# Patient Record
Sex: MALE | Race: WHITE | NOT HISPANIC OR LATINO
[De-identification: names, ages, dates, MRNs, and addresses within clinical notes are randomized per-mention and may not be internally consistent; named-entity substitution may affect disease eponyms.]

---

## 2017-09-25 ENCOUNTER — CHARTING TRANS (OUTPATIENT)
Dept: OTHER | Age: 72
End: 2017-09-25

## 2018-10-01 ENCOUNTER — CHARTING TRANS (OUTPATIENT)
Dept: OTHER | Age: 73
End: 2018-10-01

## 2018-10-22 ENCOUNTER — CHARTING TRANS (OUTPATIENT)
Dept: OTHER | Age: 73
End: 2018-10-22

## 2018-11-06 ENCOUNTER — CHARTING TRANS (OUTPATIENT)
Dept: OTHER | Age: 73
End: 2018-11-06

## 2018-11-07 ENCOUNTER — OFFICE VISIT (OUTPATIENT)
Dept: WOUND CARE | Facility: HOSPITAL | Age: 73
End: 2018-11-07
Attending: INTERNAL MEDICINE
Payer: MEDICARE

## 2018-11-07 DIAGNOSIS — R60.0 LOCALIZED EDEMA: ICD-10-CM

## 2018-11-07 DIAGNOSIS — I87.331 CHRONIC VENOUS HYPERTENSION (IDIOPATHIC) WITH ULCER AND INFLAMMATION OF RIGHT LOWER EXTREMITY (HCC): ICD-10-CM

## 2018-11-07 DIAGNOSIS — S81.801A UNSPECIFIED OPEN WOUND, RIGHT LOWER LEG, INITIAL ENCOUNTER: Primary | ICD-10-CM

## 2018-11-07 DIAGNOSIS — S81.802A UNSPECIFIED OPEN WOUND, LEFT LOWER LEG, INITIAL ENCOUNTER: ICD-10-CM

## 2018-11-07 DIAGNOSIS — L97.919 CHRONIC VENOUS HYPERTENSION (IDIOPATHIC) WITH ULCER AND INFLAMMATION OF RIGHT LOWER EXTREMITY (HCC): ICD-10-CM

## 2018-11-07 PROCEDURE — 11045 DBRDMT SUBQ TISS EACH ADDL: CPT

## 2018-11-07 PROCEDURE — 11042 DBRDMT SUBQ TIS 1ST 20SQCM/<: CPT

## 2018-11-07 PROCEDURE — 29581 APPL MULTLAYER CMPRN SYS LEG: CPT

## 2018-11-07 PROCEDURE — 99205 OFFICE O/P NEW HI 60 MIN: CPT

## 2018-11-07 PROCEDURE — 97597 DBRDMT OPN WND 1ST 20 CM/<: CPT

## 2018-11-07 NOTE — PROGRESS NOTES
Chief Complaint  This information was obtained from the patient  Patient is here for an initial consult. He presents with wounds on his lower legs from edema from surgery ranging from [id number].  He has been seeing [facility] wound care for the past 5 ye history of:  Asthma  Hyperlipidemia  Hypertension  Venous insufficiency  Prostate cancer    Surgical History  This information was obtained from the patient  Patient has a surgical history of:  Retinal surgery  Vein surgery  Hip replacement  Knee replaceme Not Healed. Initial wound encounter measurements are 1.9cm length x 1.2cm width with no measurable depth, with an area of 2.28 sq cm . No tunneling has been noted. No sinus tract has been noted. No undermining has been noted.  There is a small amount of ser exhibited: Edema. The temperature of the periwound skin is WNL. Periwound skin does not exhibit signs or symptoms of infection.  Local Pulse is Normal.    Wound #4 Left, Lateral Lower Leg is a chronic Full Thickness Venous Ulcer and has received a status of noted. No sinus tract has been noted. No undermining has been noted. There is a scant amount of serous drainage noted which has no odor. The patient reports a wound pain of level 0/10. The wound margin is well defined.  Wound bed has 51-75% slough, 1-25% pi present  Compression Device In Use: Yes  Device Used Correctly: Yes  Compression Device Used: Profore 4 layer  Calf Measurement 34 cm from heel with left measurement of 38 cm  Ankle Measurement 12 cm from heel with left measurement of 24.4 cm  Right Extrem out was not conducted prior to the start of the procedure. There was a minimal amount of bleeding. The procedure was tolerated well with a pain level of 0 throughout and a pain level of 0 following the procedure.  Post Debridement Measurements: 1.9cm length cm;    Wound #7  Wound #7 (Venous Ulcer) is located on the right, medial lower leg. A Multi-Layer Compression Wrap procedure was performed. A 2 Layers Coflex was applied with . The procedure was tolerated well.         Plan    Wound Orders:  Wound #1 Left, Left Leg  Avoid prolonged standing in one place. Elevate leg(s)as much as possible.      Wound #4 Left, Lateral Lower Leg     Topicals:  Initial Anesthetic Order: Apply lidocaine to wound bed on all future wound center visits during preparation for physici center visits during preparation for physician exam if wound bed contains fibrin or eschar. 4% Topical Lidocaine    Wound Cleansing & Dressings  May shower with protection.   Cleanse with saline or wound cleanser  Collagen - endoform  Hydrofera Transfer

## 2018-11-16 ENCOUNTER — OFFICE VISIT (OUTPATIENT)
Dept: WOUND CARE | Facility: HOSPITAL | Age: 73
End: 2018-11-16
Attending: INTERNAL MEDICINE
Payer: MEDICARE

## 2018-11-16 DIAGNOSIS — S81.801D UNSPECIFIED OPEN WOUND, RIGHT LOWER LEG, SUBSEQUENT ENCOUNTER: ICD-10-CM

## 2018-11-16 DIAGNOSIS — S81.802A WOUND OF LEFT LEG: Primary | ICD-10-CM

## 2018-11-16 DIAGNOSIS — S81.802D UNSPECIFIED OPEN WOUND, LEFT LOWER LEG, SUBSEQUENT ENCOUNTER: ICD-10-CM

## 2018-11-16 PROCEDURE — 87077 CULTURE AEROBIC IDENTIFY: CPT

## 2018-11-16 PROCEDURE — 11045 DBRDMT SUBQ TISS EACH ADDL: CPT

## 2018-11-16 PROCEDURE — 87205 SMEAR GRAM STAIN: CPT

## 2018-11-16 PROCEDURE — 87186 SC STD MICRODIL/AGAR DIL: CPT

## 2018-11-16 PROCEDURE — 29581 APPL MULTLAYER CMPRN SYS LEG: CPT

## 2018-11-16 PROCEDURE — 87070 CULTURE OTHR SPECIMN AEROBIC: CPT

## 2018-11-16 PROCEDURE — 97597 DBRDMT OPN WND 1ST 20 CM/<: CPT

## 2018-11-16 PROCEDURE — 97598 DBRDMT OPN WND ADDL 20CM/<: CPT

## 2018-11-16 PROCEDURE — 11042 DBRDMT SUBQ TIS 1ST 20SQCM/<: CPT

## 2018-11-16 NOTE — PROGRESS NOTES
Chief Complaint  This information was obtained from the patient  Patient is here for a follow up visit for b/l LE wounds. He has no complaints and states home health RN is \"doing a great job. \" Came in with coban.     Allergies  NKDA    HPI  This informa Yes        Objective    Constitutional  Height/Length: 73 in (185.42 cm), Weight: 275 lbs (125 kgs), BMI: 36.3, Temperature: 97.4 °F (36.33 °C), Pulse: 64 bpm, Respiratory Rate: 16 breaths/min, Blood Pressure: 118/74 mmHg.      Integumentary (Hair, Skin)  W x 1.4cm width x 0.1cm depth, with an area of 3.92 sq cm and a volume of 0.392 cubic cm. No tunneling has been noted. No sinus tract has been noted. No undermining has been noted. There is a large amount of serous drainage noted which has no odor.  The patie has 51-75% slough, 1-25% bright red, pink, spongy granulation. The periwound skin exhibited: Edema, Moist, Maceration, Erythema. The temperature of the periwound skin is WNL. Periwound skin does not exhibit signs or symptoms of infection.  Local Pulse is 1.3cm length x 2.7cm width x 0.1cm depth, with an area of 3.51 sq cm and a volume of 0.351 cubic cm. No tunneling has been noted. No sinus tract has been noted. No undermining has been noted.  There is a moderate amount of serous drainage noted which has no left lower leg, subsequent encounter  (Encounter Diagnosis) I87.333 - Chronic venous hypertension (idiopathic) with ulcer and inflammation of bilateral lower extremity  (Encounter Diagnosis) R60.0 - Localized edema        Procedures    Wound #3  Wound #3 ( of 55 sq cm and a volume of 11 cubic cm; Wound #5  Wound #5 (Venous Ulcer) is located on the left, posterior lower leg.  A selective debridement with a total area debrided of 23.5 sq cm was performed by Monika Pelayo MD. to remove devitalized tis Lidocaine    Wound Cleansing & Dressings  May shower with protection. Cleanse with saline or wound cleanser  Iodosorb  Kerramax/Super absorbent  ABD pad  Change dressing 3x/week    Compression Therapy:  Comprilan  Compression to Right Leg.   Compression to Therapy:  Comprilan  Compression to Right Leg. Compression to Left Leg  Avoid prolonged standing in one place. Elevate leg(s)as much as possible.      Wound #5 Left, Posterior Lower Leg     Topicals:  Initial Anesthetic Order: Apply lidocaine to wound bed on all future wound center visits during preparation for physician exam if wound bed contains fibrin or eschar.   4% Topical Lidocaine    Wound Cleansing & Dressings  Cleanse with saline or wound cleanser  Honey Gel - cover with adaptic and gauze  Change

## 2018-11-19 ENCOUNTER — OFFICE VISIT (OUTPATIENT)
Dept: WOUND CARE | Facility: HOSPITAL | Age: 73
End: 2018-11-19
Attending: INTERNAL MEDICINE
Payer: MEDICARE

## 2018-11-19 DIAGNOSIS — S81.802D UNSPECIFIED OPEN WOUND, LEFT LOWER LEG, SUBSEQUENT ENCOUNTER: ICD-10-CM

## 2018-11-19 DIAGNOSIS — S81.801D UNSPECIFIED OPEN WOUND, RIGHT LOWER LEG, SUBSEQUENT ENCOUNTER: Primary | ICD-10-CM

## 2018-11-19 DIAGNOSIS — I87.331 CHRONIC VENOUS HYPERTENSION (IDIOPATHIC) WITH ULCER AND INFLAMMATION OF RIGHT LOWER EXTREMITY (HCC): ICD-10-CM

## 2018-11-19 DIAGNOSIS — L97.919 CHRONIC VENOUS HYPERTENSION (IDIOPATHIC) WITH ULCER AND INFLAMMATION OF RIGHT LOWER EXTREMITY (HCC): ICD-10-CM

## 2018-11-19 PROCEDURE — 29581 APPL MULTLAYER CMPRN SYS LEG: CPT

## 2018-11-21 ENCOUNTER — OFFICE VISIT (OUTPATIENT)
Dept: WOUND CARE | Facility: HOSPITAL | Age: 73
End: 2018-11-21
Attending: INTERNAL MEDICINE
Payer: MEDICARE

## 2018-11-21 DIAGNOSIS — S81.802D UNSPECIFIED OPEN WOUND, LEFT LOWER LEG, SUBSEQUENT ENCOUNTER: ICD-10-CM

## 2018-11-21 DIAGNOSIS — L97.919 CHRONIC VENOUS HYPERTENSION (IDIOPATHIC) WITH ULCER AND INFLAMMATION OF RIGHT LOWER EXTREMITY (HCC): ICD-10-CM

## 2018-11-21 DIAGNOSIS — S81.801D UNSPECIFIED OPEN WOUND, RIGHT LOWER LEG, SUBSEQUENT ENCOUNTER: Primary | ICD-10-CM

## 2018-11-21 DIAGNOSIS — I87.331 CHRONIC VENOUS HYPERTENSION (IDIOPATHIC) WITH ULCER AND INFLAMMATION OF RIGHT LOWER EXTREMITY (HCC): ICD-10-CM

## 2018-11-21 PROCEDURE — 11045 DBRDMT SUBQ TISS EACH ADDL: CPT

## 2018-11-21 PROCEDURE — 97597 DBRDMT OPN WND 1ST 20 CM/<: CPT

## 2018-11-21 PROCEDURE — 11042 DBRDMT SUBQ TIS 1ST 20SQCM/<: CPT

## 2018-11-21 NOTE — PROGRESS NOTES
Chief Complaint  This information was obtained from the patient  Patient is here for a follow up bilateral leg . Patients stated he took off the wrap due too tight. He is having pain left ankle took a ibuprofen to relieve a pain.     Allergies  NKDA    HP ()  Neurological    General Notes:  negative except HPI    Additional Information  Medication reconciliation completed at today's visit. : Yes        Objective    Constitutional  Height/Length: 73 in (185.42 cm), Weight: 275 lbs (125 kgs), BMI: 36.3, Tem not exhibit signs or symptoms of infection. Local Pulse is Normal.    Wound #4 Left, Lateral Lower Leg is a chronic Full Thickness Venous Ulcer and has received a status of Not Healed.  Subsequent wound encounter measurements are 13cm length x 4.5cm width x noted. There is a moderate amount of serous drainage noted which has no odor. The patient reports a wound pain of level 0/10. The wound margin is well defined. Wound bed has 1-25% slough, 51-75% bright red, pink, firm granulation.    The periwound skin exhi Pulse is Normal.    Lower Extremity Assessment  Edema Assessment:  Left Extremity: Edema is present  Compression Device In Use: Yes  Device Used Correctly: Yes  Compression Device Used: Multi-Layer Wrap  Calf Measurement 34 cm from heel with left measureme control was achieved using N/A. A time out was not conducted prior to the start of the procedure. A moderate amount of bleeding was controlled with pressure.  The procedure was tolerated well with a pain level of 0 throughout and a pain level of 0 following Apply lidocaine to wound bed on all future wound center visits during preparation for physician exam if wound bed contains fibrin or eschar. 4% Topical Lidocaine    Wound Cleansing & Dressings  May shower with protection.   Cleanse with saline or wound skyler protection. Cleanse with saline or wound cleanser  Honey Gel  Kerramax/Super absorbent  ABD pad  Change dressing every other day and as needed.     Wound #7 Right, Medial Lower Leg     Topicals:  Initial Anesthetic Order: Apply lidocaine to wound bed on al

## 2018-11-30 ENCOUNTER — OFFICE VISIT (OUTPATIENT)
Dept: WOUND CARE | Facility: HOSPITAL | Age: 73
End: 2018-11-30
Attending: INTERNAL MEDICINE
Payer: MEDICARE

## 2018-11-30 DIAGNOSIS — S81.801D UNSPECIFIED OPEN WOUND, RIGHT LOWER LEG, SUBSEQUENT ENCOUNTER: Primary | ICD-10-CM

## 2018-11-30 DIAGNOSIS — L97.919 CHRONIC VENOUS HYPERTENSION (IDIOPATHIC) WITH ULCER AND INFLAMMATION OF RIGHT LOWER EXTREMITY (HCC): ICD-10-CM

## 2018-11-30 DIAGNOSIS — I87.331 CHRONIC VENOUS HYPERTENSION (IDIOPATHIC) WITH ULCER AND INFLAMMATION OF RIGHT LOWER EXTREMITY (HCC): ICD-10-CM

## 2018-11-30 DIAGNOSIS — S81.802D UNSPECIFIED OPEN WOUND, LEFT LOWER LEG, SUBSEQUENT ENCOUNTER: ICD-10-CM

## 2018-11-30 PROCEDURE — 97598 DBRDMT OPN WND ADDL 20CM/<: CPT

## 2018-11-30 PROCEDURE — 11042 DBRDMT SUBQ TIS 1ST 20SQCM/<: CPT

## 2018-11-30 PROCEDURE — 97597 DBRDMT OPN WND 1ST 20 CM/<: CPT

## 2018-11-30 PROCEDURE — 11045 DBRDMT SUBQ TISS EACH ADDL: CPT

## 2018-11-30 NOTE — PROGRESS NOTES
Chief Complaint  This information was obtained from the patient  Patient is here for bilateral lower legs. Allergies  NKDA    HPI  This information was obtained from the patient    11-30-18: WOunds stable and slightly better. 11-21-18:  Wound cultu (GI)  Genitourinary ()  Neurological    General Notes:  negative except HPI    Additional Information  Medication reconciliation completed at today's visit. : Yes        Objective    Constitutional  Height/Length: 73 in (185.42 cm), Weight: 275 lbs (125 Lateral Lower Leg is a chronic Full Thickness Venous Ulcer and has received a status of Not Healed. Subsequent wound encounter measurements are 12.9cm length x 4.7cm width x 0.1cm depth, with an area of 60.63 sq cm and a volume of 6.063 cubic cm.  No tunnel odor. The patient reports a wound pain of level 0/10. The wound margin is well defined. Wound bed has 26-50% slough, 26-50% bright red, pink, firm granulation. The periwound skin exhibited: Edema, Dry/Scaly, Erythema.  The temperature of the periwound ski Device In Use: Yes  Device Used Correctly: Yes  Compression Device Used: Tubigrip or Tubifast  Calf Measurement 34 cm from heel with left measurement of 41 cm  Ankle Measurement 12 cm from heel with left measurement of 27.6 cm  Right Extremity: Edema is pr achieved using 4% Lido. A time out was not conducted prior to the start of the procedure. A minimal amount of bleeding was controlled with pressure.  The procedure was tolerated well with a pain level of 0 throughout and a pain level of 0 following the proc (Venous Ulcer) is located on the left, medial ankle. A selective debridement with a total area debrided of 11.55 sq cm was performed by Brian Figueroa MD. to remove devitalized tissue: biofilm and slough.  The following instrument(s) were used: curet Anesthetic Order: Apply lidocaine to wound bed on all future wound center visits during preparation for physician exam if wound bed contains fibrin or eschar. 4% Topical Lidocaine    Wound Cleansing & Dressings  May shower with protection.   Cleanse with s shower with protection. Cleanse with saline or wound cleanser  Honey Gel  Kerramax/Super absorbent  ABD pad  Change dressing every other day and as needed.     Wound #8 Left Second Toe     Topicals:  Initial Anesthetic Order: Apply lidocaine to wound bed o

## 2018-12-07 ENCOUNTER — OFFICE VISIT (OUTPATIENT)
Dept: WOUND CARE | Facility: HOSPITAL | Age: 73
End: 2018-12-07
Attending: INTERNAL MEDICINE
Payer: MEDICARE

## 2018-12-07 DIAGNOSIS — L97.919 CHRONIC VENOUS HYPERTENSION (IDIOPATHIC) WITH ULCER AND INFLAMMATION OF RIGHT LOWER EXTREMITY (HCC): ICD-10-CM

## 2018-12-07 DIAGNOSIS — S81.801D UNSPECIFIED OPEN WOUND, RIGHT LOWER LEG, SUBSEQUENT ENCOUNTER: Primary | ICD-10-CM

## 2018-12-07 DIAGNOSIS — S81.802D UNSPECIFIED OPEN WOUND, LEFT LOWER LEG, SUBSEQUENT ENCOUNTER: ICD-10-CM

## 2018-12-07 DIAGNOSIS — I87.331 CHRONIC VENOUS HYPERTENSION (IDIOPATHIC) WITH ULCER AND INFLAMMATION OF RIGHT LOWER EXTREMITY (HCC): ICD-10-CM

## 2018-12-07 PROCEDURE — 11045 DBRDMT SUBQ TISS EACH ADDL: CPT

## 2018-12-07 PROCEDURE — 29581 APPL MULTLAYER CMPRN SYS LEG: CPT

## 2018-12-07 PROCEDURE — 97597 DBRDMT OPN WND 1ST 20 CM/<: CPT

## 2018-12-07 PROCEDURE — 11042 DBRDMT SUBQ TIS 1ST 20SQCM/<: CPT

## 2018-12-07 RX ORDER — FUROSEMIDE 40 MG/1
40 TABLET ORAL DAILY
Qty: 14 TABLET | Refills: 0 | Status: SHIPPED | OUTPATIENT
Start: 2018-12-07 | End: 2018-12-21

## 2018-12-07 RX ORDER — POTASSIUM CHLORIDE 750 MG/1
10 TABLET, FILM COATED, EXTENDED RELEASE ORAL DAILY
Qty: 14 TABLET | Refills: 0 | Status: SHIPPED | OUTPATIENT
Start: 2018-12-07 | End: 2018-12-21

## 2018-12-07 NOTE — PROGRESS NOTES
Chief Complaint  This information was obtained from the patient  Patient is here for bilateral lower legs.  Patients stated everything is looking good and no pain    Allergies  NKDA    HPI  This information was obtained from the patient    12-7-18: Legs s (Central/Peripheral)  Gastrointestinal (GI)  Genitourinary ()  Neurological    General Notes:  negative except HPI    Additional Information  Medication reconciliation completed at today's visit. : Yes        Objective    Constitutional  Height/Length: 7 cubic cm. No tunneling has been noted. No sinus tract has been noted. No undermining has been noted. There is a large amount of serous drainage noted which has no odor. The patient reports a wound pain of level 0/10. The wound margin is well defined.  Wound wound encounter measurements are 4.1cm length x 1.6cm width x 0.1cm depth, with an area of 6.56 sq cm and a volume of 0.656 cubic cm. No tunneling has been noted. No sinus tract has been noted. No undermining has been noted.  There is a small amount of sero measurement of 39 cm   Ankle Measurement 12 cm from heel with right measurement of 24.7 cm  Vascular Assessment:  Left Extremity Pulses:   Dorsalis Pedis: Palpable  Right Extremity Pulses:   Dorsalis Pedis: Palpable  Left Extremity colors, hair growth, and cm;    Wound #6  Wound #6 (Venous Ulcer) is located on the left, medial ankle. A selective debridement with a total area debrided of 11.22 sq cm was performed by Shelly Paul MD. to remove devitalized tissue: biofilm, fibrin, and slough.  The follo Topicals:  Initial Anesthetic Order: Apply lidocaine to wound bed on all future wound center visits during preparation for physician exam if wound bed contains fibrin or eschar.   4% Topical Lidocaine    Wound Cleansing & Dressings  May shower with prot Leg.  Compression to Left Leg  Avoid prolonged standing in one place. Elevate leg(s)as much as possible. Follow-Up Appointments  Return Appointment in 1 week. - [facility]  Please schedule for 30 minute time slot.     Misc/Additional Orders  Home health care n

## 2018-12-10 ENCOUNTER — DOCUMENTATION (OUTPATIENT)
Dept: OPTOMETRY | Age: 73
End: 2018-12-10

## 2018-12-14 ENCOUNTER — OFFICE VISIT (OUTPATIENT)
Dept: WOUND CARE | Facility: HOSPITAL | Age: 73
End: 2018-12-14
Attending: INTERNAL MEDICINE
Payer: MEDICARE

## 2018-12-14 ENCOUNTER — HOSPITAL ENCOUNTER (OUTPATIENT)
Dept: LAB | Facility: HOSPITAL | Age: 73
Discharge: HOME | End: 2018-12-14
Attending: INTERNAL MEDICINE
Payer: MEDICARE

## 2018-12-14 DIAGNOSIS — S81.801D UNSPECIFIED OPEN WOUND, RIGHT LOWER LEG, SUBSEQUENT ENCOUNTER: Primary | ICD-10-CM

## 2018-12-14 DIAGNOSIS — I87.331 CHRONIC VENOUS HYPERTENSION (IDIOPATHIC) WITH ULCER AND INFLAMMATION OF RIGHT LOWER EXTREMITY (HCC): ICD-10-CM

## 2018-12-14 DIAGNOSIS — S81.802D UNSPECIFIED OPEN WOUND, LEFT LOWER LEG, SUBSEQUENT ENCOUNTER: ICD-10-CM

## 2018-12-14 DIAGNOSIS — L97.919 CHRONIC VENOUS HYPERTENSION (IDIOPATHIC) WITH ULCER AND INFLAMMATION OF RIGHT LOWER EXTREMITY (HCC): ICD-10-CM

## 2018-12-14 LAB
BASOPHILS # BLD AUTO: 0.08 X10(3) UL (ref 0–0.1)
BASOPHILS NFR BLD AUTO: 0.8 %
EOSINOPHIL # BLD AUTO: 0.35 X10(3) UL (ref 0–0.3)
EOSINOPHIL NFR BLD AUTO: 3.7 %
ERYTHROCYTE [DISTWIDTH] IN BLOOD BY AUTOMATED COUNT: 13.2 % (ref 11.5–16)
HCT VFR BLD AUTO: 45.3 % (ref 37–53)
HGB BLD-MCNC: 14.8 G/DL (ref 13–17)
IMMATURE GRANULOCYTE COUNT: 0.03 X10(3) UL (ref 0–1)
IMMATURE GRANULOCYTE RATIO %: 0.3 %
LYMPHOCYTES # BLD AUTO: 1.61 X10(3) UL (ref 0.9–4)
LYMPHOCYTES NFR BLD AUTO: 17 %
MCH RBC QN AUTO: 31.2 PG (ref 27–33.2)
MCHC RBC AUTO-ENTMCNC: 32.7 G/DL (ref 31–37)
MCV RBC AUTO: 95.4 FL (ref 80–99)
MONOCYTES # BLD AUTO: 0.75 X10(3) UL (ref 0.1–1)
MONOCYTES NFR BLD AUTO: 7.9 %
NEUTROPHIL ABS PRELIM: 6.65 X10 (3) UL (ref 1.3–6.7)
NEUTROPHILS # BLD AUTO: 6.65 X10(3) UL (ref 1.3–6.7)
NEUTROPHILS NFR BLD AUTO: 70.3 %
PLATELET # BLD AUTO: 179 10(3)UL (ref 150–450)
RBC # BLD AUTO: 4.75 X10(6)UL (ref 3.8–5.8)
RED CELL DISTRIBUTION WIDTH-SD: 45.9 FL (ref 35.1–46.3)
WBC # BLD AUTO: 9.5 X10(3) UL (ref 4–13)

## 2018-12-14 PROCEDURE — 36415 COLL VENOUS BLD VENIPUNCTURE: CPT | Performed by: INTERNAL MEDICINE

## 2018-12-14 PROCEDURE — 97598 DBRDMT OPN WND ADDL 20CM/<: CPT

## 2018-12-14 PROCEDURE — 85025 COMPLETE CBC W/AUTO DIFF WBC: CPT | Performed by: INTERNAL MEDICINE

## 2018-12-14 PROCEDURE — 80048 BASIC METABOLIC PNL TOTAL CA: CPT | Performed by: INTERNAL MEDICINE

## 2018-12-14 PROCEDURE — 97597 DBRDMT OPN WND 1ST 20 CM/<: CPT

## 2018-12-14 PROCEDURE — 29581 APPL MULTLAYER CMPRN SYS LEG: CPT

## 2018-12-14 NOTE — PROGRESS NOTES
Chief Complaint  This information was obtained from the patient  Patient is here for bilateral lower legs. Wraps good.  down 4 lbs in weight    Allergies  NKDA    HPI  This information was obtained from the patient    12-14-18: WOUNDS STABLE - BUT SIGNIFI related to:   Constitutional Symptoms (General Health)  Eyes  Ear/Nose/Mouth/Throat  Respiratory  Cardiovascular (Central/Peripheral)  Gastrointestinal (GI)  Genitourinary ()  Neurological    General Notes:  negative except HPI    Additional Information granulation. The periwound skin exhibited: Edema, Moist, Maceration, Erythema. The temperature of the periwound skin is Warm. Periwound skin does not exhibit signs or symptoms of infection.  Local Pulse is Normal.    Wound #7 Right, Medial Lower Leg is an Measurement 34 cm from heel with left measurement of 40 cm  Ankle Measurement 12 cm from heel with left measurement of 25.5 cm  Right Extremity: Edema is present  Compression Device In Use: Yes  Device Used Correctly: Yes  Compression Device Used: Tubigrip tolerated well with a pain level of 3 throughout and a pain level of 2 following the procedure. Post Debridement Measurements: 13.6cm length x 14cm width x 0.3cm depth; with an area of 190.4 sq cm and a volume of 57.12 cubic cm;     Wound #4 (Venous Ulcer) performed. A Multi-Layer Profore Regular was applied with high 30-40 mmhg. The procedure was tolerated well.         Plan    Wound Orders:  Wound #4 Left, Lateral Lower Leg     Topicals:  Initial Anesthetic Order: Apply lidocaine to wound bed on all future surgical shoe left    Additional Orders:    Compression Therapy:  Comprifore  Compression to Right Leg. Compression to Left Leg  Avoid prolonged standing in one place. Elevate leg(s)as much as possible.     Follow-Up Appointments  Return Appointment in 1

## 2018-12-21 ENCOUNTER — OFFICE VISIT (OUTPATIENT)
Dept: WOUND CARE | Facility: HOSPITAL | Age: 73
End: 2018-12-21
Attending: INTERNAL MEDICINE
Payer: MEDICARE

## 2018-12-21 DIAGNOSIS — S81.802D UNSPECIFIED OPEN WOUND, LEFT LOWER LEG, SUBSEQUENT ENCOUNTER: ICD-10-CM

## 2018-12-21 DIAGNOSIS — L97.919 CHRONIC VENOUS HYPERTENSION (IDIOPATHIC) WITH ULCER AND INFLAMMATION OF RIGHT LOWER EXTREMITY (HCC): ICD-10-CM

## 2018-12-21 DIAGNOSIS — I87.331 CHRONIC VENOUS HYPERTENSION (IDIOPATHIC) WITH ULCER AND INFLAMMATION OF RIGHT LOWER EXTREMITY (HCC): ICD-10-CM

## 2018-12-21 DIAGNOSIS — S81.801D UNSPECIFIED OPEN WOUND, RIGHT LOWER LEG, SUBSEQUENT ENCOUNTER: Primary | ICD-10-CM

## 2018-12-21 PROCEDURE — 97598 DBRDMT OPN WND ADDL 20CM/<: CPT

## 2018-12-21 PROCEDURE — 97597 DBRDMT OPN WND 1ST 20 CM/<: CPT

## 2018-12-21 PROCEDURE — 11042 DBRDMT SUBQ TIS 1ST 20SQCM/<: CPT

## 2018-12-21 PROCEDURE — 29581 APPL MULTLAYER CMPRN SYS LEG: CPT

## 2018-12-21 PROCEDURE — 11045 DBRDMT SUBQ TISS EACH ADDL: CPT

## 2018-12-21 RX ORDER — POTASSIUM CHLORIDE 750 MG/1
10 TABLET, FILM COATED, EXTENDED RELEASE ORAL 2 TIMES DAILY
Qty: 28 TABLET | Refills: 0 | Status: SHIPPED | OUTPATIENT
Start: 2018-12-21 | End: 2019-01-04

## 2018-12-21 RX ORDER — FUROSEMIDE 40 MG/1
40 TABLET ORAL 2 TIMES DAILY
Qty: 28 TABLET | Refills: 0 | Status: SHIPPED | OUTPATIENT
Start: 2018-12-21 | End: 2019-01-04

## 2018-12-21 NOTE — PROGRESS NOTES
Chief Complaint  This information was obtained from the patient  Patient is here for bilateral lower legs. Allergies  NKDA    HPI  This information was obtained from the patient    12-21-18: Wounds overall stable.  Edema improved - But, not much improv obtained from the patient  Patient denies complaints or symptoms related to:   Constitutional Symptoms (General Health)  Eyes  Ear/Nose/Mouth/Throat  Respiratory  Cardiovascular (Central/Peripheral)  Gastrointestinal (GI)  Genitourinary ()  Neurological Wound bed has 26-50% slough, 26-50% pink, firm granulation. The periwound skin exhibited: Edema, Moist, Maceration, Erythema. The temperature of the periwound skin is Warm. Periwound skin does not exhibit signs or symptoms of infection.  Local Pulse is We depth, with an area of 4.42 sq cm and a volume of 0.442 cubic cm. No tunneling has been noted. No sinus tract has been noted. No undermining has been noted. There is a moderate amount of serous drainage noted which has no odor.  The patient reports a wound subsequent encounter  (Encounter Diagnosis) S81.802D - Unspecified open wound, left lower leg, subsequent encounter  (Encounter Diagnosis) I87.333 - Chronic venous hypertension (idiopathic) with ulcer and inflammation of bilateral lower extremity  (Encount Measurements: 3.8cm length x 3cm width x 0.2cm depth; with an area of 11.4 sq cm and a volume of 2.28 cubic cm; Wound #7  Wound #7 (Venous Ulcer) is located on the right, medial lower leg.  A selective debridement with a total area debrided of 20 sq cm w contains fibrin or eschar. 4% Topical Lidocaine    Wound Cleansing & Dressings  May shower with protection. Cleanse with saline or wound cleanser  Honey Gel  Drawtex  Kerramax/Super absorbent  ABD pad  Change dressing every other day and as needed.     Wo Orders  Home health care nurse for wound care. - [facility] TO change dressings MONDAY AND WEDNESDAY   Supplement with a daily multivitamin. Increase dietary protein  Decrease salt intake    Care summary  Discussed the Plan of Care at bedside with patient.  The p

## 2018-12-24 ENCOUNTER — APPOINTMENT (OUTPATIENT)
Dept: WOUND CARE | Facility: HOSPITAL | Age: 73
End: 2018-12-24
Attending: INTERNAL MEDICINE
Payer: MEDICARE

## 2019-01-04 ENCOUNTER — OFFICE VISIT (OUTPATIENT)
Dept: WOUND CARE | Facility: HOSPITAL | Age: 74
End: 2019-01-04
Attending: INTERNAL MEDICINE
Payer: MEDICARE

## 2019-01-04 DIAGNOSIS — S81.801D WOUND OF RIGHT LOWER EXTREMITY, SUBSEQUENT ENCOUNTER: Primary | ICD-10-CM

## 2019-01-04 DIAGNOSIS — S81.802D UNSPECIFIED OPEN WOUND, LEFT LOWER LEG, SUBSEQUENT ENCOUNTER: ICD-10-CM

## 2019-01-04 PROCEDURE — 87077 CULTURE AEROBIC IDENTIFY: CPT

## 2019-01-04 PROCEDURE — 87205 SMEAR GRAM STAIN: CPT

## 2019-01-04 PROCEDURE — 87186 SC STD MICRODIL/AGAR DIL: CPT

## 2019-01-04 PROCEDURE — 87070 CULTURE OTHR SPECIMN AEROBIC: CPT

## 2019-01-04 PROCEDURE — 87147 CULTURE TYPE IMMUNOLOGIC: CPT

## 2019-01-04 PROCEDURE — 97597 DBRDMT OPN WND 1ST 20 CM/<: CPT

## 2019-01-04 PROCEDURE — 29581 APPL MULTLAYER CMPRN SYS LEG: CPT

## 2019-01-04 PROCEDURE — 97598 DBRDMT OPN WND ADDL 20CM/<: CPT

## 2019-01-04 RX ORDER — POTASSIUM CHLORIDE 750 MG/1
10 TABLET, FILM COATED, EXTENDED RELEASE ORAL 2 TIMES DAILY
Qty: 28 TABLET | Refills: 0 | Status: SHIPPED | OUTPATIENT
Start: 2019-01-04 | End: 2019-01-18

## 2019-01-04 RX ORDER — FUROSEMIDE 40 MG/1
40 TABLET ORAL 2 TIMES DAILY
Qty: 28 TABLET | Refills: 0 | Status: SHIPPED | OUTPATIENT
Start: 2019-01-04 | End: 2019-01-18

## 2019-01-04 RX ORDER — SULFAMETHOXAZOLE AND TRIMETHOPRIM 800; 160 MG/1; MG/1
1 TABLET ORAL 2 TIMES DAILY
Qty: 20 TABLET | Refills: 0 | Status: SHIPPED | OUTPATIENT
Start: 2019-01-04 | End: 2019-01-14

## 2019-01-04 NOTE — PROGRESS NOTES
Chief Complaint  This information was obtained from the patient  Patient is here for bilateral lower legs.  Home health unable to put full compression on because of pain    Allergies  NKDA    HPI  This information was obtained from the patient    12-21-18 protein diet. General Notes:  Cultures taken of right medial ankle wound and left lateral leg wound.  Bruno    Complaints and Symptoms  This information was obtained from the patient  Patient denies complaints or symptoms related to:   Constitutiona No undermining has been noted. There is a copious amount of sero-sanguineous drainage noted which has a mild odor. The patient reports a wound pain of level 3/10. The wound margin is rolled. Wound bed has 1-25% slough, 51-75% pink, firm granulation.    The not exhibit: Maceration. The temperature of the periwound skin is Warm. Periwound skin does not exhibit signs or symptoms of infection. Local Pulse is Weak.     Wound #9 Left, Medial, Superior Lower Leg is a Full Thickness Venous Ulcer and has received a st encounter measurements are 1.2cm length x 1.7cm width x 0.1cm depth, with an area of 2.04 sq cm and a volume of 0.204 cubic cm. No tunneling has been noted. No sinus tract has been noted. No undermining has been noted.  There is a moderate amount of sero-sa heel with right measurement of 24.5 cm  Vascular Assessment:  Left Extremity Pulses:  Dorsalis Pedis: Palpable  Right Extremity Pulses:  Dorsalis Pedis: Palpable  Left Extremity colors, hair growth, and conditions:  Extremity Color: Red  Hair Growth on Ext by Judy Appiah MD. to remove devitalized tissue: biofilm and slough. The following instrument(s) were used: curette. Pain control was achieved using 4% Lido. A time out was not conducted prior to the start of the procedure.  A minimal amount of bl future wound center visits during preparation for physician exam if wound bed contains fibrin or eschar. 4% Topical Lidocaine    Wound Cleansing & Dressings  May shower with protection.   Cleanse with saline or wound cleanser  Sorbact  Alginate  Kerramax/S bedside with patient. The patient verbally acknowledges understanding of all instructions and all questions were answered.  - Will apply skin substitutes once wound is more vascular  Perfusion assessed by doppler. - strong bounding DP pulse b/l on Doppler

## 2019-01-08 ENCOUNTER — OFFICE VISIT (OUTPATIENT)
Dept: WOUND CARE | Facility: HOSPITAL | Age: 74
End: 2019-01-08
Attending: INTERNAL MEDICINE
Payer: MEDICARE

## 2019-01-08 DIAGNOSIS — I87.331 CHRONIC VENOUS HYPERTENSION (IDIOPATHIC) WITH ULCER AND INFLAMMATION OF RIGHT LOWER EXTREMITY (HCC): ICD-10-CM

## 2019-01-08 DIAGNOSIS — S81.802D UNSPECIFIED OPEN WOUND, LEFT LOWER LEG, SUBSEQUENT ENCOUNTER: ICD-10-CM

## 2019-01-08 DIAGNOSIS — L97.919 CHRONIC VENOUS HYPERTENSION (IDIOPATHIC) WITH ULCER AND INFLAMMATION OF RIGHT LOWER EXTREMITY (HCC): ICD-10-CM

## 2019-01-08 DIAGNOSIS — S81.801D WOUND OF RIGHT LOWER EXTREMITY, SUBSEQUENT ENCOUNTER: Primary | ICD-10-CM

## 2019-01-08 DIAGNOSIS — S81.801D UNSPECIFIED OPEN WOUND, RIGHT LOWER LEG, SUBSEQUENT ENCOUNTER: ICD-10-CM

## 2019-01-08 PROCEDURE — 29581 APPL MULTLAYER CMPRN SYS LEG: CPT

## 2019-01-11 ENCOUNTER — OFFICE VISIT (OUTPATIENT)
Dept: WOUND CARE | Facility: HOSPITAL | Age: 74
End: 2019-01-11
Attending: INTERNAL MEDICINE
Payer: MEDICARE

## 2019-01-11 ENCOUNTER — HOSPITAL ENCOUNTER (OUTPATIENT)
Dept: LAB | Facility: HOSPITAL | Age: 74
Discharge: HOME | End: 2019-01-11
Attending: INTERNAL MEDICINE
Payer: MEDICARE

## 2019-01-11 DIAGNOSIS — S81.801D WOUND OF RIGHT LOWER EXTREMITY, SUBSEQUENT ENCOUNTER: Primary | ICD-10-CM

## 2019-01-11 DIAGNOSIS — I87.331 CHRONIC VENOUS HYPERTENSION (IDIOPATHIC) WITH ULCER AND INFLAMMATION OF RIGHT LOWER EXTREMITY (HCC): ICD-10-CM

## 2019-01-11 DIAGNOSIS — S81.801D UNSPECIFIED OPEN WOUND, RIGHT LOWER LEG, SUBSEQUENT ENCOUNTER: ICD-10-CM

## 2019-01-11 DIAGNOSIS — L97.919 CHRONIC VENOUS HYPERTENSION (IDIOPATHIC) WITH ULCER AND INFLAMMATION OF RIGHT LOWER EXTREMITY (HCC): ICD-10-CM

## 2019-01-11 DIAGNOSIS — S81.801D WOUND OF RIGHT LOWER EXTREMITY, SUBSEQUENT ENCOUNTER: ICD-10-CM

## 2019-01-11 DIAGNOSIS — S81.802D UNSPECIFIED OPEN WOUND, LEFT LOWER LEG, SUBSEQUENT ENCOUNTER: ICD-10-CM

## 2019-01-11 LAB
ANION GAP SERPL CALC-SCNC: 5 MMOL/L (ref 0–18)
BASOPHILS # BLD AUTO: 0.06 X10(3) UL (ref 0–0.1)
BASOPHILS NFR BLD AUTO: 0.7 %
BUN BLD-MCNC: 41 MG/DL (ref 8–20)
BUN/CREAT SERPL: 25.6 (ref 10–20)
CALCIUM BLD-MCNC: 9.9 MG/DL (ref 8.3–10.3)
CHLORIDE SERPL-SCNC: 103 MMOL/L (ref 101–111)
CO2 SERPL-SCNC: 29 MMOL/L (ref 22–32)
CREAT BLD-MCNC: 1.6 MG/DL (ref 0.7–1.3)
EOSINOPHIL # BLD AUTO: 0.24 X10(3) UL (ref 0–0.3)
EOSINOPHIL NFR BLD AUTO: 2.9 %
ERYTHROCYTE [DISTWIDTH] IN BLOOD BY AUTOMATED COUNT: 12.7 % (ref 11.5–16)
GLUCOSE BLD-MCNC: 96 MG/DL (ref 70–99)
HCT VFR BLD AUTO: 44.2 % (ref 37–53)
HGB BLD-MCNC: 14.4 G/DL (ref 13–17)
IMMATURE GRANULOCYTE COUNT: 0.01 X10(3) UL (ref 0–1)
IMMATURE GRANULOCYTE RATIO %: 0.1 %
LYMPHOCYTES # BLD AUTO: 1.23 X10(3) UL (ref 0.9–4)
LYMPHOCYTES NFR BLD AUTO: 15.1 %
MCH RBC QN AUTO: 30.3 PG (ref 27–33.2)
MCHC RBC AUTO-ENTMCNC: 32.6 G/DL (ref 31–37)
MCV RBC AUTO: 93.1 FL (ref 80–99)
MONOCYTES # BLD AUTO: 0.59 X10(3) UL (ref 0.1–1)
MONOCYTES NFR BLD AUTO: 7.2 %
NEUTROPHIL ABS PRELIM: 6.04 X10 (3) UL (ref 1.3–6.7)
NEUTROPHILS # BLD AUTO: 6.04 X10(3) UL (ref 1.3–6.7)
NEUTROPHILS NFR BLD AUTO: 74 %
OSMOLALITY SERPL CALC.SUM OF ELEC: 294 MOSM/KG (ref 275–295)
PLATELET # BLD AUTO: 262 10(3)UL (ref 150–450)
POTASSIUM SERPL-SCNC: 4.4 MMOL/L (ref 3.6–5.1)
RBC # BLD AUTO: 4.75 X10(6)UL (ref 3.8–5.8)
RED CELL DISTRIBUTION WIDTH-SD: 43.4 FL (ref 35.1–46.3)
SODIUM SERPL-SCNC: 137 MMOL/L (ref 136–144)
WBC # BLD AUTO: 8.2 X10(3) UL (ref 4–13)

## 2019-01-11 PROCEDURE — 85025 COMPLETE CBC W/AUTO DIFF WBC: CPT

## 2019-01-11 PROCEDURE — 11042 DBRDMT SUBQ TIS 1ST 20SQCM/<: CPT

## 2019-01-11 PROCEDURE — 97598 DBRDMT OPN WND ADDL 20CM/<: CPT

## 2019-01-11 PROCEDURE — 97597 DBRDMT OPN WND 1ST 20 CM/<: CPT

## 2019-01-11 PROCEDURE — 80048 BASIC METABOLIC PNL TOTAL CA: CPT

## 2019-01-11 PROCEDURE — 29581 APPL MULTLAYER CMPRN SYS LEG: CPT

## 2019-01-11 PROCEDURE — 11045 DBRDMT SUBQ TISS EACH ADDL: CPT

## 2019-01-11 PROCEDURE — 36415 COLL VENOUS BLD VENIPUNCTURE: CPT

## 2019-01-11 NOTE — PROGRESS NOTES
Chief Complaint  This information was obtained from the patient  Patient is here for nurse visit for BLE wounds. Pt states no new complaints and no pain.     Allergies  NKDA    HPI  This information was obtained from the patient    1-11-19: Wounds much im and several other  skin subs at his previous clinic.     he is extremely compliant  with low carb low salt high protein diet. General Notes:  Patient continues on the Bactrim. Dixon Bellamy director at this visit to review orders. Labs ordered this visit.  Karthik Hendrix 4cm length x 3.3cm width x 0.1cm depth, with an area of 13.2 sq cm and a volume of 1.32 cubic cm. No tunneling has been noted. No sinus tract has been noted. No undermining has been noted.  There is a copious amount of serous drainage noted which has no odo 0/10. The wound margin is well defined. Wound bed has 51-75% slough, 1-25% pink, firm granulation. The periwound skin exhibited: Edema, Dry/Scaly, Erythema. The periwound skin did not exhibit: Moist, Maceration.  The temperature of the periwound skin is W not exhibit signs or symptoms of infection. Local Pulse is Weak. General Notes:  measured at angle    Wound #11 Right, Anterior Lower Leg is an acute Full Thickness Venous Ulcer and has received a status of Not Healed.  Subsequent wound encounter measurem cm from heel with left measurement of 24.5 cm  Right Extremity: Edema is present  Compression Device In Use: Yes  Device Used Correctly: Yes  Compression Device Used: Multi-Layer Wrap  Calf Measurement 34 cm from heel with right measurement of 37 cm  Ankle Debridement Measurements: 14.5cm length x 15.5cm width x 0.3cm depth; with an area of 224.75 sq cm and a volume of 67.425 cubic cm; Wound #4 (Venous Ulcer) is located on the left, lateral lower leg.  A Multi-Layer Compression Wrap procedure was performed high 30-40 mmhg. The procedure was tolerated well. General Notes:  Comprifore    Wound #9  Wound #9 (Venous Ulcer) is located on the left, medial, superior lower leg.  A selective debridement with a total area debrided of 18.4 sq cm was performed by Panic with protection.   Cleanse with saline or wound cleanser  Sorbact  Alginate  Kerramax/Super absorbent  ABD pad  Change dressing every: - 4 days    Wound #8 Left Second Toe     Topicals:  Initial Anesthetic Order: Apply lidocaine to wound bed on all future w care. - [facility] TO change dressings MONDAY AND WEDNESDAY   Supplement with a daily multivitamin. Increase dietary protein  Decrease salt intake    Care summary  Discussed the Plan of Care at bedside with patient.  The patient verbally acknowledges understandin

## 2019-01-14 ENCOUNTER — OFFICE VISIT (OUTPATIENT)
Dept: WOUND CARE | Facility: HOSPITAL | Age: 74
End: 2019-01-14
Attending: INTERNAL MEDICINE
Payer: MEDICARE

## 2019-01-14 DIAGNOSIS — S81.801D UNSPECIFIED OPEN WOUND, RIGHT LOWER LEG, SUBSEQUENT ENCOUNTER: ICD-10-CM

## 2019-01-14 DIAGNOSIS — S81.802D UNSPECIFIED OPEN WOUND, LEFT LOWER LEG, SUBSEQUENT ENCOUNTER: ICD-10-CM

## 2019-01-14 DIAGNOSIS — S81.802A WOUND OF LEFT LEG: ICD-10-CM

## 2019-01-14 DIAGNOSIS — L97.919 CHRONIC VENOUS HYPERTENSION (IDIOPATHIC) WITH ULCER AND INFLAMMATION OF RIGHT LOWER EXTREMITY (HCC): ICD-10-CM

## 2019-01-14 DIAGNOSIS — I87.331 CHRONIC VENOUS HYPERTENSION (IDIOPATHIC) WITH ULCER AND INFLAMMATION OF RIGHT LOWER EXTREMITY (HCC): ICD-10-CM

## 2019-01-14 DIAGNOSIS — S81.801D WOUND OF RIGHT LOWER EXTREMITY, SUBSEQUENT ENCOUNTER: Primary | ICD-10-CM

## 2019-01-14 PROCEDURE — 29581 APPL MULTLAYER CMPRN SYS LEG: CPT

## 2019-01-18 ENCOUNTER — OFFICE VISIT (OUTPATIENT)
Dept: WOUND CARE | Facility: HOSPITAL | Age: 74
End: 2019-01-18
Attending: INTERNAL MEDICINE
Payer: MEDICARE

## 2019-01-18 DIAGNOSIS — S81.801D UNSPECIFIED OPEN WOUND, RIGHT LOWER LEG, SUBSEQUENT ENCOUNTER: ICD-10-CM

## 2019-01-18 DIAGNOSIS — L97.919 CHRONIC VENOUS HYPERTENSION (IDIOPATHIC) WITH ULCER AND INFLAMMATION OF RIGHT LOWER EXTREMITY (HCC): ICD-10-CM

## 2019-01-18 DIAGNOSIS — S81.802D UNSPECIFIED OPEN WOUND, LEFT LOWER LEG, SUBSEQUENT ENCOUNTER: ICD-10-CM

## 2019-01-18 DIAGNOSIS — I87.331 CHRONIC VENOUS HYPERTENSION (IDIOPATHIC) WITH ULCER AND INFLAMMATION OF RIGHT LOWER EXTREMITY (HCC): ICD-10-CM

## 2019-01-18 DIAGNOSIS — S81.801D WOUND OF RIGHT LOWER EXTREMITY, SUBSEQUENT ENCOUNTER: Primary | ICD-10-CM

## 2019-01-18 PROCEDURE — 97597 DBRDMT OPN WND 1ST 20 CM/<: CPT

## 2019-01-18 PROCEDURE — 29581 APPL MULTLAYER CMPRN SYS LEG: CPT

## 2019-01-18 PROCEDURE — 97598 DBRDMT OPN WND ADDL 20CM/<: CPT

## 2019-01-18 RX ORDER — POTASSIUM CHLORIDE 750 MG/1
10 TABLET, FILM COATED, EXTENDED RELEASE ORAL 2 TIMES DAILY
Qty: 28 TABLET | Refills: 0 | Status: SHIPPED | OUTPATIENT
Start: 2019-01-18 | End: 2019-02-01

## 2019-01-18 RX ORDER — FUROSEMIDE 40 MG/1
40 TABLET ORAL 2 TIMES DAILY
Qty: 28 TABLET | Refills: 0 | Status: SHIPPED | OUTPATIENT
Start: 2019-01-18 | End: 2019-02-01

## 2019-01-25 ENCOUNTER — OFFICE VISIT (OUTPATIENT)
Dept: WOUND CARE | Facility: HOSPITAL | Age: 74
End: 2019-01-25
Attending: INTERNAL MEDICINE
Payer: MEDICARE

## 2019-01-25 ENCOUNTER — HOSPITAL ENCOUNTER (OUTPATIENT)
Dept: LAB | Facility: HOSPITAL | Age: 74
Discharge: HOME | End: 2019-01-25
Attending: INTERNAL MEDICINE
Payer: MEDICARE

## 2019-01-25 DIAGNOSIS — L97.919 CHRONIC VENOUS HYPERTENSION (IDIOPATHIC) WITH ULCER AND INFLAMMATION OF RIGHT LOWER EXTREMITY (HCC): ICD-10-CM

## 2019-01-25 DIAGNOSIS — I87.331 CHRONIC VENOUS HYPERTENSION (IDIOPATHIC) WITH ULCER AND INFLAMMATION OF RIGHT LOWER EXTREMITY (HCC): ICD-10-CM

## 2019-01-25 DIAGNOSIS — S81.802D UNSPECIFIED OPEN WOUND, LEFT LOWER LEG, SUBSEQUENT ENCOUNTER: ICD-10-CM

## 2019-01-25 DIAGNOSIS — S81.801D WOUND OF RIGHT LOWER EXTREMITY, SUBSEQUENT ENCOUNTER: Primary | ICD-10-CM

## 2019-01-25 DIAGNOSIS — S81.801D UNSPECIFIED OPEN WOUND, RIGHT LOWER LEG, SUBSEQUENT ENCOUNTER: ICD-10-CM

## 2019-01-25 LAB
ANION GAP SERPL CALC-SCNC: 6 MMOL/L (ref 0–18)
BUN BLD-MCNC: 44 MG/DL (ref 8–20)
BUN/CREAT SERPL: 37.3 (ref 10–20)
CALCIUM BLD-MCNC: 10.1 MG/DL (ref 8.3–10.3)
CHLORIDE SERPL-SCNC: 104 MMOL/L (ref 101–111)
CO2 SERPL-SCNC: 28 MMOL/L (ref 22–32)
CREAT BLD-MCNC: 1.18 MG/DL (ref 0.7–1.3)
GLUCOSE BLD-MCNC: 86 MG/DL (ref 70–99)
OSMOLALITY SERPL CALC.SUM OF ELEC: 296 MOSM/KG (ref 275–295)
POTASSIUM SERPL-SCNC: 4.1 MMOL/L (ref 3.6–5.1)
SODIUM SERPL-SCNC: 138 MMOL/L (ref 136–144)

## 2019-01-25 PROCEDURE — 29581 APPL MULTLAYER CMPRN SYS LEG: CPT

## 2019-01-25 PROCEDURE — 36415 COLL VENOUS BLD VENIPUNCTURE: CPT | Performed by: INTERNAL MEDICINE

## 2019-01-25 PROCEDURE — 80048 BASIC METABOLIC PNL TOTAL CA: CPT | Performed by: INTERNAL MEDICINE

## 2019-01-25 NOTE — PROGRESS NOTES
Chief Complaint  This information was obtained from the patient  Patient is here for follow up for BLE wounds. denies pain to wounds, has both wraps on when he arrived.     Allergies  NKDA    HPI  This information was obtained from the patient    1-18-19: for the same - nothing more to do per his vein specialist.   he has had pretty much all available standard of care treatment at [facility] wound centre over last several years. He has been in compression wraps for last 5 years.    He has had apligraf ti the periwound skin is Warm. Periwound skin does not exhibit signs or symptoms of infection. Local Pulse is Normal.    Wound #6 Left, Medial Ankle is a chronic Full Thickness Venous Ulcer and has received a status of Not Healed.  Subsequent wound encounter m encounter measurements are 1.1cm length x 1.3cm width x 0.1cm depth, with an area of 1.43 sq cm and a volume of 0.143 cubic cm. No tunneling has been noted. No sinus tract has been noted. No undermining has been noted.  There is a small amount of serous benton The patient reports a wound pain of level 0/10. The wound margin is scabbed. Wound bed has 26-50% epithelialization, 1-25% slough, 1-25% firm granulation. The periwound skin exhibited: Edema, Dry/Scaly, Hemosiderosis.  The periwound skin did not exhibit: skin does not exhibit signs or symptoms of infection. Local Pulse is Weak.     Lower Extremity Assessment  Edema Assessment:  Left Extremity: Edema is present  Compression Device In Use: Yes  Device Used Correctly: Yes  Compression Device Used: Multi-Layer medial lower leg. A Multi-Layer Compression Wrap procedure was performed. A Multi-Layer was applied with high 30-40 mmhg. The procedure was tolerated well.    General Notes:  Comprifore        Plan    Wound Orders:  Wound #4 Left, Lateral Lower Leg     Topi #9 Left, Medial, Superior Lower Leg     Wound Cleansing & Dressings  May shower with protection.   Cleanse with saline or wound cleanser  Cleanse with Vashe  Hydrofera Transfer   Kerramax/Super absorbent  ABD pad  Kerlix  Paper tape  Change dressing every:

## 2019-01-29 ENCOUNTER — OFFICE VISIT (OUTPATIENT)
Dept: WOUND CARE | Facility: HOSPITAL | Age: 74
End: 2019-01-29
Attending: INTERNAL MEDICINE
Payer: MEDICARE

## 2019-01-29 DIAGNOSIS — S81.801D UNSPECIFIED OPEN WOUND, RIGHT LOWER LEG, SUBSEQUENT ENCOUNTER: ICD-10-CM

## 2019-01-29 DIAGNOSIS — L97.919 CHRONIC VENOUS HYPERTENSION (IDIOPATHIC) WITH ULCER AND INFLAMMATION OF RIGHT LOWER EXTREMITY (HCC): ICD-10-CM

## 2019-01-29 DIAGNOSIS — S81.802D UNSPECIFIED OPEN WOUND, LEFT LOWER LEG, SUBSEQUENT ENCOUNTER: ICD-10-CM

## 2019-01-29 DIAGNOSIS — S81.801D WOUND OF RIGHT LOWER EXTREMITY, SUBSEQUENT ENCOUNTER: Primary | ICD-10-CM

## 2019-01-29 DIAGNOSIS — I87.331 CHRONIC VENOUS HYPERTENSION (IDIOPATHIC) WITH ULCER AND INFLAMMATION OF RIGHT LOWER EXTREMITY (HCC): ICD-10-CM

## 2019-01-29 PROCEDURE — 29581 APPL MULTLAYER CMPRN SYS LEG: CPT

## 2019-02-01 ENCOUNTER — HOSPITAL ENCOUNTER (OUTPATIENT)
Dept: LAB | Facility: HOSPITAL | Age: 74
Discharge: HOME | End: 2019-02-01
Attending: INTERNAL MEDICINE
Payer: MEDICARE

## 2019-02-01 ENCOUNTER — OFFICE VISIT (OUTPATIENT)
Dept: WOUND CARE | Facility: HOSPITAL | Age: 74
End: 2019-02-01
Attending: INTERNAL MEDICINE
Payer: MEDICARE

## 2019-02-01 DIAGNOSIS — L97.919 CHRONIC VENOUS HYPERTENSION (IDIOPATHIC) WITH ULCER AND INFLAMMATION OF RIGHT LOWER EXTREMITY (HCC): ICD-10-CM

## 2019-02-01 DIAGNOSIS — S81.802D UNSPECIFIED OPEN WOUND, LEFT LOWER LEG, SUBSEQUENT ENCOUNTER: ICD-10-CM

## 2019-02-01 DIAGNOSIS — S81.801D WOUND OF RIGHT LOWER EXTREMITY, SUBSEQUENT ENCOUNTER: Primary | ICD-10-CM

## 2019-02-01 DIAGNOSIS — S81.801D WOUND OF RIGHT LOWER EXTREMITY, SUBSEQUENT ENCOUNTER: ICD-10-CM

## 2019-02-01 DIAGNOSIS — I87.331 CHRONIC VENOUS HYPERTENSION (IDIOPATHIC) WITH ULCER AND INFLAMMATION OF RIGHT LOWER EXTREMITY (HCC): ICD-10-CM

## 2019-02-01 DIAGNOSIS — S81.801D UNSPECIFIED OPEN WOUND, RIGHT LOWER LEG, SUBSEQUENT ENCOUNTER: ICD-10-CM

## 2019-02-01 LAB
ANION GAP SERPL CALC-SCNC: 6 MMOL/L (ref 0–18)
BASOPHILS # BLD AUTO: 0.05 X10(3) UL (ref 0–0.2)
BASOPHILS NFR BLD AUTO: 0.6 %
BUN BLD-MCNC: 40 MG/DL (ref 8–20)
BUN/CREAT SERPL: 31 (ref 10–20)
CALCIUM BLD-MCNC: 9.8 MG/DL (ref 8.3–10.3)
CHLORIDE SERPL-SCNC: 104 MMOL/L (ref 101–111)
CO2 SERPL-SCNC: 29 MMOL/L (ref 22–32)
CREAT BLD-MCNC: 1.29 MG/DL (ref 0.7–1.3)
DEPRECATED RDW RBC AUTO: 50.4 FL (ref 35.1–46.3)
EOSINOPHIL # BLD AUTO: 0.27 X10(3) UL (ref 0–0.7)
EOSINOPHIL NFR BLD AUTO: 3.1 %
ERYTHROCYTE [DISTWIDTH] IN BLOOD BY AUTOMATED COUNT: 14.6 % (ref 11–15)
GLUCOSE BLD-MCNC: 91 MG/DL (ref 70–99)
HCT VFR BLD AUTO: 42 % (ref 39–53)
HGB BLD-MCNC: 13.7 G/DL (ref 13–17.5)
IMM GRANULOCYTES # BLD AUTO: 0.03 X10(3) UL (ref 0–1)
IMM GRANULOCYTES NFR BLD: 0.3 %
LYMPHOCYTES # BLD AUTO: 1.18 X10(3) UL (ref 1–4)
LYMPHOCYTES NFR BLD AUTO: 13.7 %
MCH RBC QN AUTO: 30.4 PG (ref 26–34)
MCHC RBC AUTO-ENTMCNC: 32.6 G/DL (ref 31–37)
MCV RBC AUTO: 93.3 FL (ref 80–100)
MONOCYTES # BLD AUTO: 0.76 X10(3) UL (ref 0.1–1)
MONOCYTES NFR BLD AUTO: 8.8 %
NEUTROPHILS # BLD AUTO: 6.35 X10 (3) UL (ref 1.5–7.7)
NEUTROPHILS # BLD AUTO: 6.35 X10(3) UL (ref 1.5–7.7)
NEUTROPHILS NFR BLD AUTO: 73.5 %
OSMOLALITY SERPL CALC.SUM OF ELEC: 297 MOSM/KG (ref 275–295)
PLATELET # BLD AUTO: 141 10(3)UL (ref 150–450)
POTASSIUM SERPL-SCNC: 4 MMOL/L (ref 3.6–5.1)
RBC # BLD AUTO: 4.5 X10(6)UL (ref 3.8–5.8)
SODIUM SERPL-SCNC: 139 MMOL/L (ref 136–144)
WBC # BLD AUTO: 8.6 X10(3) UL (ref 4–11)

## 2019-02-01 PROCEDURE — 15272 SKIN SUB GRAFT T/A/L ADD-ON: CPT

## 2019-02-01 PROCEDURE — 97598 DBRDMT OPN WND ADDL 20CM/<: CPT

## 2019-02-01 PROCEDURE — 36415 COLL VENOUS BLD VENIPUNCTURE: CPT

## 2019-02-01 PROCEDURE — 29581 APPL MULTLAYER CMPRN SYS LEG: CPT

## 2019-02-01 PROCEDURE — 85025 COMPLETE CBC W/AUTO DIFF WBC: CPT

## 2019-02-01 PROCEDURE — 80048 BASIC METABOLIC PNL TOTAL CA: CPT

## 2019-02-01 PROCEDURE — 15271 SKIN SUB GRAFT TRNK/ARM/LEG: CPT

## 2019-02-01 PROCEDURE — 97597 DBRDMT OPN WND 1ST 20 CM/<: CPT

## 2019-02-01 RX ORDER — FUROSEMIDE 40 MG/1
40 TABLET ORAL 2 TIMES DAILY
Qty: 60 TABLET | Refills: 0 | Status: SHIPPED | OUTPATIENT
Start: 2019-02-01 | End: 2019-03-01

## 2019-02-01 RX ORDER — POTASSIUM CHLORIDE 750 MG/1
10 TABLET, FILM COATED, EXTENDED RELEASE ORAL 2 TIMES DAILY
Qty: 60 TABLET | Refills: 0 | Status: SHIPPED | OUTPATIENT
Start: 2019-02-01 | End: 2019-03-01

## 2019-02-01 NOTE — PROGRESS NOTES
Chief Complaint  This information was obtained from the patient  Patient is here for nurse visit for BLE wounds. denies pain to wounds. Patient removed coban to left leg because it was too tight.     Allergies  NKDA    HPI  This information was obtained f knee).  based on the history that he is giving me, he as been diagnosed with venous reflux disease of lower extremtiies and has had several treatments for the same - nothing more to do per his vein specialist.   he has had pretty much all available standar epithelialization, 1-25% slough, 51-75% bright red, pink, firm granulation. The periwound skin exhibited: Edema, Dry/Scaly, Hemosiderosis. The periwound skin did not exhibit: Moist, Maceration, Erythema. The temperature of the periwound skin is Warm.  Per exhibit signs or symptoms of infection. Local Pulse is Weak. Wound #8 Left Second Toe is a chronic Full Thickness Abrasion and has received a status of Not Healed.  Subsequent wound encounter measurements are 1cm length x 1.1cm width x 0.1cm depth, with tunneling has been noted. No sinus tract has been noted. No undermining has been noted. There is a scant amount of sero-sanguineous drainage noted which has no odor. The patient reports a wound pain of level 0/10. The wound margin is flat and intact.  Wound 36.9 cm  Ankle Measurement 12 cm from heel with right measurement of 24.2 cm  Vascular Assessment:  Left Extremity Pulses:  Dorsalis Pedis: Palpable  Right Extremity Pulses:  Dorsalis Pedis: Palpable  Left Extremity colors, hair growth, and conditions:  Ex Multi-Layer Profore Regular was applied with high 30-40 mmhg. The procedure was tolerated well. General Notes:  Comprifore    Wound #6  Wound #6 (Venous Ulcer) is located on the left, medial ankle.  A selective debridement with a total area debrided of 10 bleeding was controlled with pressure. The procedure was tolerated well with a pain level of 0 throughout and a pain level of 0 following the procedure.  Post Debridement Measurements: 4.2cm length x 3cm width x 0.2cm depth; with an area of 12.6 sq cm and a lidocaine to wound bed on all future wound center visits during preparation for physician exam if wound bed contains fibrin or eschar. 4% Topical Lidocaine    Wound Cleansing & Dressings  May shower with protection.   Cleanse with saline or wound cleanser

## 2019-02-05 ENCOUNTER — OFFICE VISIT (OUTPATIENT)
Dept: WOUND CARE | Facility: HOSPITAL | Age: 74
End: 2019-02-05
Attending: INTERNAL MEDICINE
Payer: MEDICARE

## 2019-02-05 DIAGNOSIS — S81.801D UNSPECIFIED OPEN WOUND, RIGHT LOWER LEG, SUBSEQUENT ENCOUNTER: ICD-10-CM

## 2019-02-05 DIAGNOSIS — S81.802D UNSPECIFIED OPEN WOUND, LEFT LOWER LEG, SUBSEQUENT ENCOUNTER: ICD-10-CM

## 2019-02-05 DIAGNOSIS — S81.801D WOUND OF RIGHT LOWER EXTREMITY, SUBSEQUENT ENCOUNTER: Primary | ICD-10-CM

## 2019-02-05 PROCEDURE — 29581 APPL MULTLAYER CMPRN SYS LEG: CPT

## 2019-02-08 ENCOUNTER — OFFICE VISIT (OUTPATIENT)
Dept: WOUND CARE | Facility: HOSPITAL | Age: 74
End: 2019-02-08
Attending: INTERNAL MEDICINE
Payer: MEDICARE

## 2019-02-08 DIAGNOSIS — S81.802D UNSPECIFIED OPEN WOUND, LEFT LOWER LEG, SUBSEQUENT ENCOUNTER: ICD-10-CM

## 2019-02-08 DIAGNOSIS — S81.801D WOUND OF RIGHT LOWER EXTREMITY, SUBSEQUENT ENCOUNTER: Primary | ICD-10-CM

## 2019-02-08 DIAGNOSIS — S81.801D UNSPECIFIED OPEN WOUND, RIGHT LOWER LEG, SUBSEQUENT ENCOUNTER: ICD-10-CM

## 2019-02-08 PROCEDURE — 15271 SKIN SUB GRAFT TRNK/ARM/LEG: CPT

## 2019-02-08 PROCEDURE — 11045 DBRDMT SUBQ TISS EACH ADDL: CPT

## 2019-02-08 PROCEDURE — 15272 SKIN SUB GRAFT T/A/L ADD-ON: CPT

## 2019-02-08 PROCEDURE — 11042 DBRDMT SUBQ TIS 1ST 20SQCM/<: CPT

## 2019-02-08 RX ORDER — SULFAMETHOXAZOLE AND TRIMETHOPRIM 400; 80 MG/1; MG/1
1 TABLET ORAL DAILY
Qty: 30 TABLET | Refills: 0 | Status: SHIPPED | OUTPATIENT
Start: 2019-02-08 | End: 2019-03-01

## 2019-02-08 NOTE — PROGRESS NOTES
Chief Complaint  This information was obtained from the patient  Patient is here for follow up of BLE wounds. Patient states no new complaints or pain.     Allergies  NKDA    HPI  This information was obtained from the patient    2-9-19: Wounds are overal evaluation and management of bilateral lower extremity ulcers. He hs had these wounds since 2009 - on and off, but mostly persistent since 2013. He says they started after joint replacement surgeries ( hip / knee).   based on the history that he is giving Dry/Scaly, Hemosiderosis. The periwound skin did not exhibit: Moist, Maceration, Erythema. The temperature of the periwound skin is Warm. Periwound skin does not exhibit signs or symptoms of infection.  Local Pulse is Normal.   General Notes:  Therlamontein int General Notes:  Measured on angle    Wound #8 Left Second Toe is a chronic Full Thickness Abrasion and has received a status of Not Healed.  Subsequent wound encounter measurements are 1cm length x 1.1cm width x 0.1cm depth, with an area of 1.1 sq cm and No sinus tract has been noted. No undermining has been noted. There is a small amount of sero-sanguineous drainage noted which has no odor. The patient reports a wound pain of level 0/10. The wound margin is flat and intact.  Wound bed has % pink, fir Chronic venous hypertension (idiopathic) with ulcer and inflammation of bilateral lower extremity  (Encounter Diagnosis) R60.0 - Localized edema        Procedures    Wound #6  Wound #6 (Venous Ulcer) is located on the left, medial ankle.  A skin/subcutaneou fenestrated. 0 sq cm of product was wasted. 39 sq cm of product was utilized and was secured with staples. Post application, a dressing was applied: sorbact, kerramax. A time out was conducted prior to the start of the procedure.  The procedure was tolerate Dressings  May shower with protection. Cleanse with saline or wound cleanser  Other skin sub: - THERASKIN SKIN SUBSTITUTE - applied - secure with staples - cover with sorbact/ alginate / kerramax and ABD pad as needed.    Kerramax/Super absorbent  ABD pad Appointment in 1 week. - Friday  RN visit for assessment of wound(s). - tue  Please schedule for 30 minute time slot. Misc/Additional Orders  [place] with a daily multivitamin.   Increase dietary protein  Decrease salt intake

## 2019-02-12 ENCOUNTER — OFFICE VISIT (OUTPATIENT)
Dept: WOUND CARE | Facility: HOSPITAL | Age: 74
End: 2019-02-12
Attending: INTERNAL MEDICINE
Payer: MEDICARE

## 2019-02-12 DIAGNOSIS — S81.802D UNSPECIFIED OPEN WOUND, LEFT LOWER LEG, SUBSEQUENT ENCOUNTER: ICD-10-CM

## 2019-02-12 DIAGNOSIS — S81.801D WOUND OF RIGHT LOWER EXTREMITY, SUBSEQUENT ENCOUNTER: Primary | ICD-10-CM

## 2019-02-12 DIAGNOSIS — S81.801D UNSPECIFIED OPEN WOUND, RIGHT LOWER LEG, SUBSEQUENT ENCOUNTER: ICD-10-CM

## 2019-02-12 PROCEDURE — 29581 APPL MULTLAYER CMPRN SYS LEG: CPT

## 2019-02-15 ENCOUNTER — OFFICE VISIT (OUTPATIENT)
Dept: WOUND CARE | Facility: HOSPITAL | Age: 74
End: 2019-02-15
Attending: INTERNAL MEDICINE
Payer: MEDICARE

## 2019-02-15 ENCOUNTER — HOSPITAL ENCOUNTER (OUTPATIENT)
Dept: LAB | Facility: HOSPITAL | Age: 74
Discharge: HOME | End: 2019-02-15
Attending: INTERNAL MEDICINE
Payer: MEDICARE

## 2019-02-15 DIAGNOSIS — S81.801D UNSPECIFIED OPEN WOUND, RIGHT LOWER LEG, SUBSEQUENT ENCOUNTER: ICD-10-CM

## 2019-02-15 DIAGNOSIS — I87.331 CHRONIC VENOUS HYPERTENSION (IDIOPATHIC) WITH ULCER AND INFLAMMATION OF RIGHT LOWER EXTREMITY (HCC): ICD-10-CM

## 2019-02-15 DIAGNOSIS — S81.802D UNSPECIFIED OPEN WOUND, LEFT LOWER LEG, SUBSEQUENT ENCOUNTER: Primary | ICD-10-CM

## 2019-02-15 DIAGNOSIS — L97.919 CHRONIC VENOUS HYPERTENSION (IDIOPATHIC) WITH ULCER AND INFLAMMATION OF RIGHT LOWER EXTREMITY (HCC): ICD-10-CM

## 2019-02-15 LAB
ANION GAP SERPL CALC-SCNC: 8 MMOL/L (ref 0–18)
BUN BLD-MCNC: 36 MG/DL (ref 7–18)
BUN/CREAT SERPL: 27.3 (ref 10–20)
CALCIUM BLD-MCNC: 9.8 MG/DL (ref 8.5–10.1)
CHLORIDE SERPL-SCNC: 105 MMOL/L (ref 98–107)
CO2 SERPL-SCNC: 28 MMOL/L (ref 21–32)
CREAT BLD-MCNC: 1.32 MG/DL (ref 0.7–1.3)
GLUCOSE BLD-MCNC: 104 MG/DL (ref 70–99)
OSMOLALITY SERPL CALC.SUM OF ELEC: 301 MOSM/KG (ref 275–295)
POTASSIUM SERPL-SCNC: 4.5 MMOL/L (ref 3.5–5.1)
SODIUM SERPL-SCNC: 141 MMOL/L (ref 136–145)

## 2019-02-15 PROCEDURE — 36415 COLL VENOUS BLD VENIPUNCTURE: CPT | Performed by: INTERNAL MEDICINE

## 2019-02-15 PROCEDURE — 15271 SKIN SUB GRAFT TRNK/ARM/LEG: CPT

## 2019-02-15 PROCEDURE — 15272 SKIN SUB GRAFT T/A/L ADD-ON: CPT

## 2019-02-15 PROCEDURE — 29581 APPL MULTLAYER CMPRN SYS LEG: CPT

## 2019-02-15 PROCEDURE — 80048 BASIC METABOLIC PNL TOTAL CA: CPT | Performed by: INTERNAL MEDICINE

## 2019-02-15 NOTE — PROGRESS NOTES
Chief Complaint  This information was obtained from the patient  Patient is here for a follow up with no complaints or concerns, denies pain to wounds.     Allergies  NKDA    HPI  This information was obtained from the patient    2-15-19: Wounds all stabl is on 7 day course of doxycycline. 11-7-18:  WOUND CLINIC CONSULTATION INITIAL VISIT    76 YO CM  here for evaluation and management of bilateral lower extremity ulcers. He hs had these wounds since 2009 - on and off, but mostly persistent since 2013. defined. The periwound skin exhibited: Edema, Dry/Scaly, Hemosiderosis. The periwound skin did not exhibit: Moist, Maceration, Erythema. The temperature of the periwound skin is Warm. Periwound skin does not exhibit signs or symptoms of infection.  Local signs or symptoms of infection. Local Pulse is Normal.   General Notes:  Measured on angle    Wound #8 Left Second Toe is a chronic Full Thickness Abrasion and has received a status of Not Healed.  Subsequent wound encounter measurements are 0.9cm length x level 0/10. The wound margin is flat and intact. Wound bed has 1-25% epithelialization, % pink, firm granulation. The periwound skin exhibited: Edema, Erythema, Hemosiderosis. The periwound skin did not exhibit: Dry/Scaly, Moist, Maceration.  The te edema        Procedures    Wound #4  Wound #4 (Venous Ulcer) is located on the left, lateral lower leg. A skin substitute procedure was performed using Theraskin by Chapito Kauffman MD with an application area of 39 sq cm.  Using sterile technique, the layer        Plan    Wound Orders:  Wound #4 Left, Lateral Lower Leg     Topicals:  Initial Anesthetic Order: Apply lidocaine to wound bed on all future wound center visits during preparation for physician exam if wound bed contains fibrin or eschar.   4% T eschar. 4% Topical Lidocaine    Wound Cleansing & Dressings  May shower with protection.   Cleanse with saline or wound cleanser  Other skin sub: - THERASKIN SKIN SUBSTITUTE - applied - secure with staples - cover with sorbact/ alginate / kerramax and ABD Doppler    Laboratory:   BMP (Chem7)        RTC 1 week

## 2019-02-19 ENCOUNTER — OFFICE VISIT (OUTPATIENT)
Dept: WOUND CARE | Facility: HOSPITAL | Age: 74
End: 2019-02-19
Attending: INTERNAL MEDICINE
Payer: MEDICARE

## 2019-02-19 DIAGNOSIS — S81.802D UNSPECIFIED OPEN WOUND, LEFT LOWER LEG, SUBSEQUENT ENCOUNTER: Primary | ICD-10-CM

## 2019-02-19 DIAGNOSIS — S81.801D UNSPECIFIED OPEN WOUND, RIGHT LOWER LEG, SUBSEQUENT ENCOUNTER: ICD-10-CM

## 2019-02-19 DIAGNOSIS — I87.331 CHRONIC VENOUS HYPERTENSION (IDIOPATHIC) WITH ULCER AND INFLAMMATION OF RIGHT LOWER EXTREMITY (HCC): ICD-10-CM

## 2019-02-19 DIAGNOSIS — L97.919 CHRONIC VENOUS HYPERTENSION (IDIOPATHIC) WITH ULCER AND INFLAMMATION OF RIGHT LOWER EXTREMITY (HCC): ICD-10-CM

## 2019-02-19 PROCEDURE — 29581 APPL MULTLAYER CMPRN SYS LEG: CPT

## 2019-02-22 ENCOUNTER — OFFICE VISIT (OUTPATIENT)
Dept: WOUND CARE | Facility: HOSPITAL | Age: 74
End: 2019-02-22
Attending: INTERNAL MEDICINE
Payer: MEDICARE

## 2019-02-22 ENCOUNTER — HOSPITAL ENCOUNTER (OUTPATIENT)
Dept: LAB | Facility: HOSPITAL | Age: 74
Discharge: HOME | End: 2019-02-22
Attending: INTERNAL MEDICINE
Payer: MEDICARE

## 2019-02-22 DIAGNOSIS — L97.919 CHRONIC VENOUS HYPERTENSION (IDIOPATHIC) WITH ULCER AND INFLAMMATION OF RIGHT LOWER EXTREMITY (HCC): ICD-10-CM

## 2019-02-22 DIAGNOSIS — S81.801D UNSPECIFIED OPEN WOUND, RIGHT LOWER LEG, SUBSEQUENT ENCOUNTER: ICD-10-CM

## 2019-02-22 DIAGNOSIS — S81.801D WOUND OF RIGHT LOWER EXTREMITY, SUBSEQUENT ENCOUNTER: ICD-10-CM

## 2019-02-22 DIAGNOSIS — S81.801D WOUND OF RIGHT LOWER EXTREMITY, SUBSEQUENT ENCOUNTER: Primary | ICD-10-CM

## 2019-02-22 DIAGNOSIS — I87.331 CHRONIC VENOUS HYPERTENSION (IDIOPATHIC) WITH ULCER AND INFLAMMATION OF RIGHT LOWER EXTREMITY (HCC): ICD-10-CM

## 2019-02-22 DIAGNOSIS — S81.802D UNSPECIFIED OPEN WOUND, LEFT LOWER LEG, SUBSEQUENT ENCOUNTER: ICD-10-CM

## 2019-02-22 LAB
ANION GAP SERPL CALC-SCNC: 8 MMOL/L (ref 0–18)
BUN BLD-MCNC: 43 MG/DL (ref 7–18)
BUN/CREAT SERPL: 33.6 (ref 10–20)
CALCIUM BLD-MCNC: 10.1 MG/DL (ref 8.5–10.1)
CHLORIDE SERPL-SCNC: 105 MMOL/L (ref 98–107)
CO2 SERPL-SCNC: 29 MMOL/L (ref 21–32)
CREAT BLD-MCNC: 1.28 MG/DL (ref 0.7–1.3)
GLUCOSE BLD-MCNC: 89 MG/DL (ref 70–99)
OSMOLALITY SERPL CALC.SUM OF ELEC: 304 MOSM/KG (ref 275–295)
POTASSIUM SERPL-SCNC: 4.1 MMOL/L (ref 3.5–5.1)
SODIUM SERPL-SCNC: 142 MMOL/L (ref 136–145)

## 2019-02-22 PROCEDURE — 80048 BASIC METABOLIC PNL TOTAL CA: CPT

## 2019-02-22 PROCEDURE — 11042 DBRDMT SUBQ TIS 1ST 20SQCM/<: CPT

## 2019-02-22 PROCEDURE — 11045 DBRDMT SUBQ TISS EACH ADDL: CPT

## 2019-02-22 PROCEDURE — 36415 COLL VENOUS BLD VENIPUNCTURE: CPT

## 2019-02-22 PROCEDURE — 29581 APPL MULTLAYER CMPRN SYS LEG: CPT

## 2019-02-22 NOTE — PROGRESS NOTES
Chief Complaint  This information was obtained from the patient  Patient is here for a wound are follow up. He denies any pain to his wounds. Allergies  NKDA    HPI  This information was obtained from the patient    2-22-19:  Major wounds covered with Wounds have deteriorated significantly since last visit - Significant thick slough / necrotic tissue on the larger wounds of left leg - leg circumference much bigger - seems like he is not tolerating Coflex 2.    Need to r/o infeciton - he is on 7 day cours noted. There is a moderate amount of serous drainage noted which has no odor. The patient reports a wound pain of level 0/10. The wound margin is well defined. The periwound skin exhibited: Edema, Dry/Scaly, Hemosiderosis.  The periwound skin did not exhi Maceration. The temperature of the periwound skin is Warm. Periwound skin does not exhibit signs or symptoms of infection. Local Pulse is Normal.   General Notes:  Measured at an angle.     Wound #8 Left Second Toe is a chronic Full Thickness Abrasion and h encounter measurements are 3.1cm length x 2.7cm width x 0.1cm depth, with an area of 8.37 sq cm and a volume of 0.837 cubic cm. No tunneling has been noted. No sinus tract has been noted. No undermining has been noted.  There is a small amount of sero-sangu procedure was tolerated well. General Notes:  Coflex 2 layer    Wound #6  Wound #6 (Venous Ulcer) is located on the left, medial ankle.  A skin/subcutaneous tissue level excisional/surgical debridement with a total area debrided of 8.7 sq cm was performed lidocaine to wound bed on all future wound center visits during preparation for physician exam if wound bed contains fibrin or eschar. 4% Topical Lidocaine    Wound Cleansing & Dressings  May shower with protection.   Cleanse with saline or wound cleanser with protection. Cleanse with saline or wound cleanser  Other skin sub: - THERASKIN SKIN SUBSTITUTE - applied - secure with staples - cover with sorbact/ alginate / kerramax and ABD pad as needed.    Kerramax/Super absorbent  ABD pad  Kerlix  Paper tape  C

## 2019-02-26 ENCOUNTER — OFFICE VISIT (OUTPATIENT)
Dept: WOUND CARE | Facility: HOSPITAL | Age: 74
End: 2019-02-26
Attending: INTERNAL MEDICINE
Payer: MEDICARE

## 2019-02-26 DIAGNOSIS — I87.331 CHRONIC VENOUS HYPERTENSION (IDIOPATHIC) WITH ULCER AND INFLAMMATION OF RIGHT LOWER EXTREMITY (HCC): ICD-10-CM

## 2019-02-26 DIAGNOSIS — L97.919 CHRONIC VENOUS HYPERTENSION (IDIOPATHIC) WITH ULCER AND INFLAMMATION OF RIGHT LOWER EXTREMITY (HCC): ICD-10-CM

## 2019-02-26 DIAGNOSIS — S81.801D WOUND OF RIGHT LOWER EXTREMITY, SUBSEQUENT ENCOUNTER: Primary | ICD-10-CM

## 2019-02-26 DIAGNOSIS — S81.801D UNSPECIFIED OPEN WOUND, RIGHT LOWER LEG, SUBSEQUENT ENCOUNTER: ICD-10-CM

## 2019-02-26 PROCEDURE — 29581 APPL MULTLAYER CMPRN SYS LEG: CPT

## 2019-03-01 ENCOUNTER — OFFICE VISIT (OUTPATIENT)
Dept: WOUND CARE | Facility: HOSPITAL | Age: 74
End: 2019-03-01
Attending: INTERNAL MEDICINE
Payer: MEDICARE

## 2019-03-01 DIAGNOSIS — L97.919 CHRONIC VENOUS HYPERTENSION (IDIOPATHIC) WITH ULCER AND INFLAMMATION OF RIGHT LOWER EXTREMITY (HCC): ICD-10-CM

## 2019-03-01 DIAGNOSIS — S81.801D UNSPECIFIED OPEN WOUND, RIGHT LOWER LEG, SUBSEQUENT ENCOUNTER: ICD-10-CM

## 2019-03-01 DIAGNOSIS — S81.801D WOUND OF RIGHT LOWER EXTREMITY, SUBSEQUENT ENCOUNTER: Primary | ICD-10-CM

## 2019-03-01 DIAGNOSIS — I87.331 CHRONIC VENOUS HYPERTENSION (IDIOPATHIC) WITH ULCER AND INFLAMMATION OF RIGHT LOWER EXTREMITY (HCC): ICD-10-CM

## 2019-03-01 PROCEDURE — 29581 APPL MULTLAYER CMPRN SYS LEG: CPT

## 2019-03-01 PROCEDURE — 15272 SKIN SUB GRAFT T/A/L ADD-ON: CPT

## 2019-03-01 PROCEDURE — 15271 SKIN SUB GRAFT TRNK/ARM/LEG: CPT

## 2019-03-01 RX ORDER — FUROSEMIDE 40 MG/1
40 TABLET ORAL 2 TIMES DAILY
Qty: 60 TABLET | Refills: 0 | Status: SHIPPED | OUTPATIENT
Start: 2019-03-01 | End: 2019-04-05

## 2019-03-01 RX ORDER — POTASSIUM CHLORIDE 750 MG/1
10 TABLET, FILM COATED, EXTENDED RELEASE ORAL 2 TIMES DAILY
Qty: 60 TABLET | Refills: 0 | Status: SHIPPED | OUTPATIENT
Start: 2019-03-01 | End: 2019-04-05

## 2019-03-01 RX ORDER — SULFAMETHOXAZOLE AND TRIMETHOPRIM 400; 80 MG/1; MG/1
1 TABLET ORAL DAILY
Qty: 30 TABLET | Refills: 0 | Status: SHIPPED | OUTPATIENT
Start: 2019-03-01 | End: 2019-04-05

## 2019-03-05 ENCOUNTER — OFFICE VISIT (OUTPATIENT)
Dept: WOUND CARE | Facility: HOSPITAL | Age: 74
End: 2019-03-05
Attending: INTERNAL MEDICINE
Payer: MEDICARE

## 2019-03-05 DIAGNOSIS — S81.801D UNSPECIFIED OPEN WOUND, RIGHT LOWER LEG, SUBSEQUENT ENCOUNTER: ICD-10-CM

## 2019-03-05 DIAGNOSIS — L97.919 CHRONIC VENOUS HYPERTENSION (IDIOPATHIC) WITH ULCER AND INFLAMMATION OF RIGHT LOWER EXTREMITY (HCC): ICD-10-CM

## 2019-03-05 DIAGNOSIS — I87.331 CHRONIC VENOUS HYPERTENSION (IDIOPATHIC) WITH ULCER AND INFLAMMATION OF RIGHT LOWER EXTREMITY (HCC): ICD-10-CM

## 2019-03-05 DIAGNOSIS — S81.801D WOUND OF RIGHT LOWER EXTREMITY, SUBSEQUENT ENCOUNTER: Primary | ICD-10-CM

## 2019-03-05 PROCEDURE — 29581 APPL MULTLAYER CMPRN SYS LEG: CPT

## 2019-03-08 ENCOUNTER — OFFICE VISIT (OUTPATIENT)
Dept: WOUND CARE | Facility: HOSPITAL | Age: 74
End: 2019-03-08
Attending: INTERNAL MEDICINE
Payer: MEDICARE

## 2019-03-08 DIAGNOSIS — S81.801D UNSPECIFIED OPEN WOUND, RIGHT LOWER LEG, SUBSEQUENT ENCOUNTER: ICD-10-CM

## 2019-03-08 DIAGNOSIS — S81.801D WOUND OF RIGHT LOWER EXTREMITY, SUBSEQUENT ENCOUNTER: Primary | ICD-10-CM

## 2019-03-08 DIAGNOSIS — I87.331 CHRONIC VENOUS HYPERTENSION (IDIOPATHIC) WITH ULCER AND INFLAMMATION OF RIGHT LOWER EXTREMITY (HCC): ICD-10-CM

## 2019-03-08 DIAGNOSIS — L97.919 CHRONIC VENOUS HYPERTENSION (IDIOPATHIC) WITH ULCER AND INFLAMMATION OF RIGHT LOWER EXTREMITY (HCC): ICD-10-CM

## 2019-03-08 PROCEDURE — 15271 SKIN SUB GRAFT TRNK/ARM/LEG: CPT

## 2019-03-08 PROCEDURE — 15272 SKIN SUB GRAFT T/A/L ADD-ON: CPT

## 2019-03-08 PROCEDURE — 29581 APPL MULTLAYER CMPRN SYS LEG: CPT

## 2019-03-09 NOTE — PROGRESS NOTES
Chief Complaint  This information was obtained from the patient  Patient is here for a wound are follow up. He denies any pain to his wounds. States that he \"feels great\".     Allergies  NKDA    HPI  This information was obtained from the patient    3-8 12-14-18: WOUNDS STABLE - BUT SIGNIFICANT PERIWOUND MACERATION AND SKIN BREAKDOWNS BOTH LEGS - UNCLEAR WHY   NO PURULENT DISCHARGE / NEW PAIN. 12-7-18: Legs swollen - wounds stable - one wound closed.      11-30-18: WOunds stable and slightly better Health)  Eyes  Ear/Nose/Mouth/Throat  Respiratory  Cardiovascular (Central/Peripheral)  Gastrointestinal (GI)  Genitourinary ()  Neurological    General Notes:  negative except HPI    Additional Information  Medication reconciliation completed at today's of infection. Local Pulse is Weak. Wound #7 Right, Medial Lower Leg is an acute Full Thickness Venous Ulcer and has received a status of Bridged.  Subsequent wound encounter measurements are 7cm length x 6.5cm width with no measurable depth, with an area well defined. The periwound skin exhibited: Edema, Dry/Scaly, Hemosiderosis. The periwound skin did not exhibit: Moist, Maceration, Erythema. The temperature of the periwound skin is Warm. Periwound skin does not exhibit signs or symptoms of infection.  L Foot is an acute Partial Thickness Trauma Wound and has received a status of Not Healed. Subsequent wound encounter measurements are 2.8cm length x 1.3cm width with no measurable depth, with an area of 3.64 sq cm . No tunneling has been noted.  No sinus tra area debrided of 131.75 sq cm was performed by Tamia Brooks MD. Subcutaneous was removed along with devitalized tissue: biofilm and non integrating theraskin. The following instrument(s) were used: curette, forceps, and scissors.  Pain control was contains fibrin or eschar. 4% Topical Lidocaine    Wound Cleansing & Dressings  May shower with protection.   Cleanse with saline or wound cleanser  Other skin sub: - TheraSkin secured with staples  Sorbact  Kerramax/Super absorbent  Kerlix  Paper tape  Ch Leg     Wound Cleansing & Dressings  May shower with protection.   Cleanse with saline or wound cleanser  Collagen - endoform  Hydrofera Transfer   ABD pad  Kerlix  Paper tape  Change dressing every: - 4 days    Wound #13 Left, Proximal, Anterior Lower Leg

## 2019-03-12 ENCOUNTER — OFFICE VISIT (OUTPATIENT)
Dept: WOUND CARE | Facility: HOSPITAL | Age: 74
End: 2019-03-12
Attending: INTERNAL MEDICINE
Payer: MEDICARE

## 2019-03-12 DIAGNOSIS — I87.331 CHRONIC VENOUS HYPERTENSION (IDIOPATHIC) WITH ULCER AND INFLAMMATION OF RIGHT LOWER EXTREMITY (HCC): ICD-10-CM

## 2019-03-12 DIAGNOSIS — L97.919 CHRONIC VENOUS HYPERTENSION (IDIOPATHIC) WITH ULCER AND INFLAMMATION OF RIGHT LOWER EXTREMITY (HCC): ICD-10-CM

## 2019-03-12 DIAGNOSIS — S81.801D WOUND OF RIGHT LOWER EXTREMITY, SUBSEQUENT ENCOUNTER: Primary | ICD-10-CM

## 2019-03-12 DIAGNOSIS — S81.801D UNSPECIFIED OPEN WOUND, RIGHT LOWER LEG, SUBSEQUENT ENCOUNTER: ICD-10-CM

## 2019-03-12 PROCEDURE — 29581 APPL MULTLAYER CMPRN SYS LEG: CPT

## 2019-03-15 ENCOUNTER — OFFICE VISIT (OUTPATIENT)
Dept: WOUND CARE | Facility: HOSPITAL | Age: 74
End: 2019-03-15
Attending: INTERNAL MEDICINE
Payer: MEDICARE

## 2019-03-15 ENCOUNTER — OFFICE VISIT (OUTPATIENT)
Dept: WOUND CARE | Age: 74
End: 2019-03-15
Attending: INTERNAL MEDICINE
Payer: MEDICARE

## 2019-03-15 DIAGNOSIS — I87.331 CHRONIC VENOUS HYPERTENSION (IDIOPATHIC) WITH ULCER AND INFLAMMATION OF RIGHT LOWER EXTREMITY (HCC): ICD-10-CM

## 2019-03-15 DIAGNOSIS — S81.802D UNSPECIFIED OPEN WOUND, LEFT LOWER LEG, SUBSEQUENT ENCOUNTER: ICD-10-CM

## 2019-03-15 DIAGNOSIS — S81.801D UNSPECIFIED OPEN WOUND, RIGHT LOWER LEG, SUBSEQUENT ENCOUNTER: Primary | ICD-10-CM

## 2019-03-15 DIAGNOSIS — L97.919 CHRONIC VENOUS HYPERTENSION (IDIOPATHIC) WITH ULCER AND INFLAMMATION OF RIGHT LOWER EXTREMITY (HCC): ICD-10-CM

## 2019-03-15 PROCEDURE — 15271 SKIN SUB GRAFT TRNK/ARM/LEG: CPT

## 2019-03-15 PROCEDURE — 29581 APPL MULTLAYER CMPRN SYS LEG: CPT

## 2019-03-15 NOTE — PROGRESS NOTES
Chief Complaint  This information was obtained from the patient  Patient is here for a follow up visit for wounds to bilateral lower extremities.     Allergies  NKDA    HPI  This information was obtained from the patient    3-15-19: Dimensions improved si culture today    12-21-18: Wounds overall stable. Edema improved - But, not much improvement in wound dimensions.      12-14-18: WOUNDS STABLE - BUT SIGNIFICANT PERIWOUND MACERATION AND SKIN BREAKDOWNS BOTH LEGS - UNCLEAR WHY   NO PURULENT DISCHARGE / NEW P patient    Complaints and Symptoms  Patient denies complaints or symptoms related to:   Constitutional Symptoms (General Health)  Eyes  Ear/Nose/Mouth/Throat  Respiratory  Cardiovascular (Central/Peripheral)  Gastrointestinal (GI)  Genitourinary ()  Neur periwound skin did not exhibit: Moist, Maceration. The temperature of the periwound skin is Warm. Periwound skin does not exhibit signs or symptoms of infection. Local Pulse is Normal.   General Notes:  Theraskin in place.     Wound #7 Right, Medial Lower L measurements are 3.9cm length x 3.7cm width x 0.1cm depth, with an area of 14.43 sq cm and a volume of 1.443 cubic cm. No tunneling has been noted. No sinus tract has been noted. No undermining has been noted.  There is a small amount of serous drainage not exhibited: Edema, Dry/Scaly, Hemosiderosis. The periwound skin did not exhibit: Moist. The temperature of the periwound skin is Warm. Periwound skin does not exhibit signs or symptoms of infection.  Local Pulse is Normal.    Wound #14 Left, Anterior Foot is edema        Procedures    Wound #4  Wound #4 (Venous Ulcer) is located on the left, lateral lower leg. A Multi-Layer Compression Wrap procedure was performed. A Multi-Layer was applied with high 30-40 mmhg. The procedure was tolerated well.    General Note Dressings  May shower with protection.   Cleanse with saline or wound cleanser  Other skin sub: - TheraSkin remains intact  Sorbact  Kerramax/Super absorbent  ABD pad  Kerlix  Paper tape  Change dressing every: - 4 days    Wound #7 Right, Medial Lower Leg Endoform  Hydrofera Transfer   ABD pad  Kerlix  Change dressing every: - 4 days    Additional Orders:    Compression Therapy:  Coflex 2 Layer - BLE  Compression to Right Leg. Compression to Left Leg  Avoid prolonged standing in one place.   Elevate leg(s)a

## 2019-03-19 ENCOUNTER — OFFICE VISIT (OUTPATIENT)
Dept: WOUND CARE | Facility: HOSPITAL | Age: 74
End: 2019-03-19
Attending: INTERNAL MEDICINE
Payer: MEDICARE

## 2019-03-19 DIAGNOSIS — L97.919 CHRONIC VENOUS HYPERTENSION (IDIOPATHIC) WITH ULCER AND INFLAMMATION OF RIGHT LOWER EXTREMITY (HCC): ICD-10-CM

## 2019-03-19 DIAGNOSIS — I87.331 CHRONIC VENOUS HYPERTENSION (IDIOPATHIC) WITH ULCER AND INFLAMMATION OF RIGHT LOWER EXTREMITY (HCC): ICD-10-CM

## 2019-03-19 DIAGNOSIS — S81.802D UNSPECIFIED OPEN WOUND, LEFT LOWER LEG, SUBSEQUENT ENCOUNTER: ICD-10-CM

## 2019-03-19 DIAGNOSIS — S81.801D UNSPECIFIED OPEN WOUND, RIGHT LOWER LEG, SUBSEQUENT ENCOUNTER: Primary | ICD-10-CM

## 2019-03-19 PROCEDURE — 29581 APPL MULTLAYER CMPRN SYS LEG: CPT

## 2019-03-22 ENCOUNTER — HOSPITAL ENCOUNTER (OUTPATIENT)
Dept: LAB | Facility: HOSPITAL | Age: 74
Discharge: HOME | End: 2019-03-22
Attending: INTERNAL MEDICINE
Payer: MEDICARE

## 2019-03-22 ENCOUNTER — OFFICE VISIT (OUTPATIENT)
Dept: WOUND CARE | Facility: HOSPITAL | Age: 74
End: 2019-03-22
Attending: INTERNAL MEDICINE
Payer: MEDICARE

## 2019-03-22 DIAGNOSIS — I87.331 CHRONIC VENOUS HYPERTENSION (IDIOPATHIC) WITH ULCER AND INFLAMMATION OF RIGHT LOWER EXTREMITY (HCC): ICD-10-CM

## 2019-03-22 DIAGNOSIS — S81.802D UNSPECIFIED OPEN WOUND, LEFT LOWER LEG, SUBSEQUENT ENCOUNTER: ICD-10-CM

## 2019-03-22 DIAGNOSIS — S81.801D WOUND OF RIGHT LOWER EXTREMITY, SUBSEQUENT ENCOUNTER: ICD-10-CM

## 2019-03-22 DIAGNOSIS — S81.801D UNSPECIFIED OPEN WOUND, RIGHT LOWER LEG, SUBSEQUENT ENCOUNTER: Primary | ICD-10-CM

## 2019-03-22 DIAGNOSIS — L97.919 CHRONIC VENOUS HYPERTENSION (IDIOPATHIC) WITH ULCER AND INFLAMMATION OF RIGHT LOWER EXTREMITY (HCC): ICD-10-CM

## 2019-03-22 PROCEDURE — 15271 SKIN SUB GRAFT TRNK/ARM/LEG: CPT

## 2019-03-22 PROCEDURE — 80048 BASIC METABOLIC PNL TOTAL CA: CPT | Performed by: INTERNAL MEDICINE

## 2019-03-22 PROCEDURE — 36415 COLL VENOUS BLD VENIPUNCTURE: CPT | Performed by: INTERNAL MEDICINE

## 2019-03-22 PROCEDURE — 11042 DBRDMT SUBQ TIS 1ST 20SQCM/<: CPT

## 2019-03-22 PROCEDURE — 15272 SKIN SUB GRAFT T/A/L ADD-ON: CPT

## 2019-03-22 PROCEDURE — 29581 APPL MULTLAYER CMPRN SYS LEG: CPT

## 2019-03-22 NOTE — PROGRESS NOTES
Chief Complaint  This information was obtained from the patient  Patient is here for a wound care follow up. He removed the wrap on the right leg last night due to pain from the coban being too tight.      Allergies  NKDA    HPI  This information was obta 11th.   [facility] reviewed - Cr - 1.2 which is OK.    1-11-19: Wounds much improved - culture positive for staph aureus. 1-4-19: Wounds deteriorated. need labs - will culture today    12-21-18: Wounds overall stable.  Edema improved - But, not much improvement lower leg. Left over skin sub applied to left medial ankle.  [facility]    Review of Systems (ROS)  This information was obtained from the patient    Complaints and Symptoms  Patient denies complaints or symptoms related to:   Constitutional Symptoms (Gener drainage noted which has no odor. The patient reports a wound pain of level 0/10. The wound margin is well defined. The periwound skin exhibited: Edema, Dry/Scaly, Hemosiderosis. The periwound skin did not exhibit: Moist, Maceration, Erythema.  The temper not exhibit signs or symptoms of infection. Local Pulse is Normal.    Wound #9 Left, Medial, Superior Lower Leg is a Full Thickness Venous Ulcer and has received a status of Bridged.  Subsequent wound encounter measurements are 4.5cm length x 3.6cm width x drainage noted. The patient reports a wound pain of level 0/10. The wound margin is well defined. Wound bed has % epithelialization. The periwound skin exhibited: Edema, Dry/Scaly, Erythema, Hemosiderosis.  The periwound skin did not exhibit: Moist, devitalized tissue: biofilm, slough, and theraskin. The following instrument(s) were used: curette. Pain control was achieved using N/A. A time out was not conducted prior to the start of the procedure.  A minimal amount of bleeding was controlled with pres (Venous Ulcer) is located on the right, medial lower leg. A Multi-Layer Compression Wrap procedure was performed. A Multi-Layer was applied with high 30-40 mmhg. The procedure was tolerated well.    General Notes:  Coflex 2 layer        Plan    Wound Orders Dressings  Hydrofera ready  Medipore tape (no substitution)   Change dressing every: - 4 days    Off-Loading  Pressure relief shoe / inserts / foams - surgical shoe left    Wound #9 Left, Medial, Superior Lower Leg     Wound Cleansing & Dressings  May show

## 2019-03-26 ENCOUNTER — OFFICE VISIT (OUTPATIENT)
Dept: WOUND CARE | Facility: HOSPITAL | Age: 74
End: 2019-03-26
Attending: INTERNAL MEDICINE
Payer: MEDICARE

## 2019-03-26 DIAGNOSIS — L97.919 CHRONIC VENOUS HYPERTENSION (IDIOPATHIC) WITH ULCER AND INFLAMMATION OF RIGHT LOWER EXTREMITY (HCC): ICD-10-CM

## 2019-03-26 DIAGNOSIS — S81.801D UNSPECIFIED OPEN WOUND, RIGHT LOWER LEG, SUBSEQUENT ENCOUNTER: Primary | ICD-10-CM

## 2019-03-26 DIAGNOSIS — I87.331 CHRONIC VENOUS HYPERTENSION (IDIOPATHIC) WITH ULCER AND INFLAMMATION OF RIGHT LOWER EXTREMITY (HCC): ICD-10-CM

## 2019-03-26 PROCEDURE — 29581 APPL MULTLAYER CMPRN SYS LEG: CPT

## 2019-03-29 ENCOUNTER — OFFICE VISIT (OUTPATIENT)
Dept: WOUND CARE | Facility: HOSPITAL | Age: 74
End: 2019-03-29
Attending: INTERNAL MEDICINE
Payer: MEDICARE

## 2019-03-29 DIAGNOSIS — S81.801D UNSPECIFIED OPEN WOUND, RIGHT LOWER LEG, SUBSEQUENT ENCOUNTER: Primary | ICD-10-CM

## 2019-03-29 DIAGNOSIS — I87.331 CHRONIC VENOUS HYPERTENSION (IDIOPATHIC) WITH ULCER AND INFLAMMATION OF RIGHT LOWER EXTREMITY (HCC): ICD-10-CM

## 2019-03-29 DIAGNOSIS — S81.802D UNSPECIFIED OPEN WOUND, LEFT LOWER LEG, SUBSEQUENT ENCOUNTER: ICD-10-CM

## 2019-03-29 DIAGNOSIS — L97.919 CHRONIC VENOUS HYPERTENSION (IDIOPATHIC) WITH ULCER AND INFLAMMATION OF RIGHT LOWER EXTREMITY (HCC): ICD-10-CM

## 2019-03-29 PROCEDURE — 29581 APPL MULTLAYER CMPRN SYS LEG: CPT

## 2019-04-02 ENCOUNTER — OFFICE VISIT (OUTPATIENT)
Dept: WOUND CARE | Facility: HOSPITAL | Age: 74
End: 2019-04-02
Attending: INTERNAL MEDICINE
Payer: MEDICARE

## 2019-04-02 DIAGNOSIS — I87.331 CHRONIC VENOUS HYPERTENSION (IDIOPATHIC) WITH ULCER AND INFLAMMATION OF RIGHT LOWER EXTREMITY (HCC): ICD-10-CM

## 2019-04-02 DIAGNOSIS — L97.919 CHRONIC VENOUS HYPERTENSION (IDIOPATHIC) WITH ULCER AND INFLAMMATION OF RIGHT LOWER EXTREMITY (HCC): ICD-10-CM

## 2019-04-02 DIAGNOSIS — S81.801D UNSPECIFIED OPEN WOUND, RIGHT LOWER LEG, SUBSEQUENT ENCOUNTER: Primary | ICD-10-CM

## 2019-04-02 PROCEDURE — 29581 APPL MULTLAYER CMPRN SYS LEG: CPT

## 2019-04-05 ENCOUNTER — OFFICE VISIT (OUTPATIENT)
Dept: WOUND CARE | Facility: HOSPITAL | Age: 74
End: 2019-04-05
Attending: INTERNAL MEDICINE
Payer: MEDICARE

## 2019-04-05 DIAGNOSIS — S81.802D UNSPECIFIED OPEN WOUND, LEFT LOWER LEG, SUBSEQUENT ENCOUNTER: ICD-10-CM

## 2019-04-05 DIAGNOSIS — L97.919 CHRONIC VENOUS HYPERTENSION (IDIOPATHIC) WITH ULCER AND INFLAMMATION OF RIGHT LOWER EXTREMITY (HCC): ICD-10-CM

## 2019-04-05 DIAGNOSIS — I87.331 CHRONIC VENOUS HYPERTENSION (IDIOPATHIC) WITH ULCER AND INFLAMMATION OF RIGHT LOWER EXTREMITY (HCC): ICD-10-CM

## 2019-04-05 DIAGNOSIS — S81.801D UNSPECIFIED OPEN WOUND, RIGHT LOWER LEG, SUBSEQUENT ENCOUNTER: Primary | ICD-10-CM

## 2019-04-05 PROCEDURE — 15271 SKIN SUB GRAFT TRNK/ARM/LEG: CPT

## 2019-04-05 PROCEDURE — 29581 APPL MULTLAYER CMPRN SYS LEG: CPT

## 2019-04-05 PROCEDURE — 97598 DBRDMT OPN WND ADDL 20CM/<: CPT

## 2019-04-05 PROCEDURE — 15272 SKIN SUB GRAFT T/A/L ADD-ON: CPT

## 2019-04-05 PROCEDURE — 97597 DBRDMT OPN WND 1ST 20 CM/<: CPT

## 2019-04-05 RX ORDER — POTASSIUM CHLORIDE 750 MG/1
10 TABLET, FILM COATED, EXTENDED RELEASE ORAL 2 TIMES DAILY
Qty: 60 TABLET | Refills: 0 | Status: SHIPPED | OUTPATIENT
Start: 2019-04-05 | End: 2019-05-03

## 2019-04-05 RX ORDER — FUROSEMIDE 40 MG/1
40 TABLET ORAL 2 TIMES DAILY
Qty: 60 TABLET | Refills: 0 | Status: SHIPPED | OUTPATIENT
Start: 2019-04-05 | End: 2019-05-03

## 2019-04-05 RX ORDER — SULFAMETHOXAZOLE AND TRIMETHOPRIM 400; 80 MG/1; MG/1
1 TABLET ORAL DAILY
Qty: 30 TABLET | Refills: 0 | Status: SHIPPED | OUTPATIENT
Start: 2019-04-05 | End: 2019-05-03

## 2019-04-05 NOTE — PROGRESS NOTES
Chief Complaint  This information was obtained from the patient  Patient is here for a RN visit. Patient denies any pain or new concerns.     Allergies  NKDA    HPI  This information was obtained from the patient    4-1-19: Wound dimensions improving - no Jan 11th. [facility] reviewed - Cr - 1.2 which is OK.    1-11-19: Wounds much improved - culture positive for staph aureus. 1-4-19: Wounds deteriorated. need labs - will culture today    12-21-18: Wounds overall stable.  Edema improved - But, not much improvem leg, left medial ankle and medial leg. 39 sqcm piece of Theraskin divided between the wounds. 3rd application to lateral leg.  KingsleymanRUSSELL    Review of Systems (ROS)  This information was obtained from the patient    Complaints and Symptoms  Patient denies c Periwound skin does not exhibit signs or symptoms of infection.  Local Pulse is Normal.   General Notes:  [facility]    Wound #7 Right, Medial Lower Leg is an acute Full Thickness Venous Ulcer and has received a status of Bridged periwound skin is Warm. Periwound skin does not exhibit signs or symptoms of infection.  Local Pulse is Normal.   General Notes:  Integrating Theraskin covers wound bed    Lower Extremity Assessment  Edema Assessment:  Left Extremity: Edema is present  Comp the left, lateral lower leg. A skin substitute procedure was performed using Theraskin by Lee Perez MD with an application area of 39 sq cm. Using sterile technique, the product was fenestrated. 0 sq cm of product was wasted.  39 sq cm of produc start of the procedure. A minimal amount of bleeding was controlled with pressure. The procedure was tolerated well with a pain level of 0 throughout and a pain level of 0 following the procedure.  Post Debridement Measurements: 4.5cm length x 4cm width x 0 bedside with patient. The patient verbally acknowledges understanding of all instructions and all questions were answered. Wound improving. No s/s of infection.

## 2019-04-09 ENCOUNTER — OFFICE VISIT (OUTPATIENT)
Dept: WOUND CARE | Facility: HOSPITAL | Age: 74
End: 2019-04-09
Attending: INTERNAL MEDICINE
Payer: MEDICARE

## 2019-04-09 DIAGNOSIS — I87.331 CHRONIC VENOUS HYPERTENSION (IDIOPATHIC) WITH ULCER AND INFLAMMATION OF RIGHT LOWER EXTREMITY (HCC): ICD-10-CM

## 2019-04-09 DIAGNOSIS — S81.801D UNSPECIFIED OPEN WOUND, RIGHT LOWER LEG, SUBSEQUENT ENCOUNTER: Primary | ICD-10-CM

## 2019-04-09 DIAGNOSIS — L97.919 CHRONIC VENOUS HYPERTENSION (IDIOPATHIC) WITH ULCER AND INFLAMMATION OF RIGHT LOWER EXTREMITY (HCC): ICD-10-CM

## 2019-04-09 DIAGNOSIS — S81.801D WOUND OF RIGHT LOWER EXTREMITY, SUBSEQUENT ENCOUNTER: ICD-10-CM

## 2019-04-09 DIAGNOSIS — S81.802D UNSPECIFIED OPEN WOUND, LEFT LOWER LEG, SUBSEQUENT ENCOUNTER: ICD-10-CM

## 2019-04-09 PROCEDURE — 29581 APPL MULTLAYER CMPRN SYS LEG: CPT

## 2019-04-12 ENCOUNTER — OFFICE VISIT (OUTPATIENT)
Dept: WOUND CARE | Facility: HOSPITAL | Age: 74
End: 2019-04-12
Attending: INTERNAL MEDICINE
Payer: MEDICARE

## 2019-04-12 DIAGNOSIS — S81.802D UNSPECIFIED OPEN WOUND, LEFT LOWER LEG, SUBSEQUENT ENCOUNTER: ICD-10-CM

## 2019-04-12 DIAGNOSIS — L97.919 CHRONIC VENOUS HYPERTENSION (IDIOPATHIC) WITH ULCER AND INFLAMMATION OF RIGHT LOWER EXTREMITY (HCC): ICD-10-CM

## 2019-04-12 DIAGNOSIS — S81.801D UNSPECIFIED OPEN WOUND, RIGHT LOWER LEG, SUBSEQUENT ENCOUNTER: Primary | ICD-10-CM

## 2019-04-12 DIAGNOSIS — I87.331 CHRONIC VENOUS HYPERTENSION (IDIOPATHIC) WITH ULCER AND INFLAMMATION OF RIGHT LOWER EXTREMITY (HCC): ICD-10-CM

## 2019-04-12 PROCEDURE — 15271 SKIN SUB GRAFT TRNK/ARM/LEG: CPT

## 2019-04-12 PROCEDURE — 29581 APPL MULTLAYER CMPRN SYS LEG: CPT

## 2019-04-12 PROCEDURE — 15272 SKIN SUB GRAFT T/A/L ADD-ON: CPT

## 2019-04-12 NOTE — PROGRESS NOTES
Chief Complaint  This information was obtained from the patient  Patient is here for a nurse visit for BLE wounds. Pt states no new complaints or pain. Allergies  NKDA    HPI  This information was obtained from the patient    4-12-19: Wounds improved. sent to [facility]. Batrim was refilled for 30 days Jan 11th. BMP reviewed - Cr - 1.2 which is OK.    1-11-19: Wounds much improved - culture positive for staph aureus. 1-4-19: Wounds deteriorated.  need labs - will culture today salt high protein diet. General Notes:  Theraskin placed to right medial LL.  Mei BSNRN    Review of Systems (ROS)  This information was obtained from the patient    Complaints and Symptoms  Patient denies complaints or symptoms related to:   [facility] bed    Wound #7 Right, Medial Lower Leg is an acute Full Thickness Venous Ulcer and has received a status of Bridged. There is a moderate amount of serous drainage noted which has no odor. The patient reports a wound pain of level 0/10.  The wound margin is in wound bed    Lower Extremity Assessment  Edema Assessment:  Left Extremity: Edema is present  Compression Device In Use: Yes  Device Used Correctly: Yes  Calf Measurement 34 cm from heel with left measurement of 36.8 cm  Ankle Measurement 12 cm from yves the procedure. Post Debridement Measurements:     Wound #7  Wound #7 (Venous Ulcer) is located on the right, medial lower leg.  A selective debridement with a total area debrided of 31.31 sq cm was performed by Lori Gonzalez MD. The following instru / secure with staples / sorbact/ kerramax  Change dressing every: - 4 days    Wound #8 Left Second Toe     Wound Cleansing & Dressings  Cleanse with saline or wound cleanser  Hydrofera ready  Dry Gauze  Medipore tape (no substitution)     Wound #9 Left, Me

## 2019-04-16 ENCOUNTER — OFFICE VISIT (OUTPATIENT)
Dept: WOUND CARE | Facility: HOSPITAL | Age: 74
End: 2019-04-16
Attending: INTERNAL MEDICINE
Payer: MEDICARE

## 2019-04-16 DIAGNOSIS — S81.801D UNSPECIFIED OPEN WOUND, RIGHT LOWER LEG, SUBSEQUENT ENCOUNTER: Primary | ICD-10-CM

## 2019-04-16 DIAGNOSIS — L97.919 CHRONIC VENOUS HYPERTENSION (IDIOPATHIC) WITH ULCER AND INFLAMMATION OF RIGHT LOWER EXTREMITY (HCC): ICD-10-CM

## 2019-04-16 DIAGNOSIS — I87.331 CHRONIC VENOUS HYPERTENSION (IDIOPATHIC) WITH ULCER AND INFLAMMATION OF RIGHT LOWER EXTREMITY (HCC): ICD-10-CM

## 2019-04-16 PROCEDURE — 29581 APPL MULTLAYER CMPRN SYS LEG: CPT

## 2019-04-19 ENCOUNTER — OFFICE VISIT (OUTPATIENT)
Dept: WOUND CARE | Facility: HOSPITAL | Age: 74
End: 2019-04-19
Attending: INTERNAL MEDICINE
Payer: MEDICARE

## 2019-04-19 DIAGNOSIS — L97.919 CHRONIC VENOUS HYPERTENSION (IDIOPATHIC) WITH ULCER AND INFLAMMATION OF RIGHT LOWER EXTREMITY (HCC): ICD-10-CM

## 2019-04-19 DIAGNOSIS — S81.801D UNSPECIFIED OPEN WOUND, RIGHT LOWER LEG, SUBSEQUENT ENCOUNTER: Primary | ICD-10-CM

## 2019-04-19 DIAGNOSIS — I87.331 CHRONIC VENOUS HYPERTENSION (IDIOPATHIC) WITH ULCER AND INFLAMMATION OF RIGHT LOWER EXTREMITY (HCC): ICD-10-CM

## 2019-04-19 PROCEDURE — 15271 SKIN SUB GRAFT TRNK/ARM/LEG: CPT

## 2019-04-19 PROCEDURE — 15272 SKIN SUB GRAFT T/A/L ADD-ON: CPT

## 2019-04-19 PROCEDURE — 97598 DBRDMT OPN WND ADDL 20CM/<: CPT

## 2019-04-19 PROCEDURE — 29581 APPL MULTLAYER CMPRN SYS LEG: CPT

## 2019-04-19 PROCEDURE — 97597 DBRDMT OPN WND 1ST 20 CM/<: CPT

## 2019-04-19 NOTE — PROGRESS NOTES
Chief Complaint  This information was obtained from the patient  Patient is here for a follow up for BLE wounds. Pt states no new complaints or pain. Allergies  NKDA    HPI  This information was obtained from the patient    4-19-19: Wounds improving. improved overall. Will start skin substitutes on the most granular region. No s/o infection.    1-18-19: Wounds getting better overall - Cr slightly up - repeat BMP pending - he need refills on lasix and K which were [facility] to [facility] last several years. He has been in compression wraps for last 5 years. He has had apligraf placement and several other  skin subs at his previous clinic.     he is extremely compliant  with low carb low salt high protein diet.     General No has received a status of Not Healed. Subsequent wound encounter measurements are 6cm length x 5.8cm width x 0.1cm depth, with an area of 34.8 sq cm and a volume of 3.48 cubic cm. No tunneling has been noted. No sinus tract has been noted.  No undermining ha drainage noted which has no odor. The patient reports a wound pain of level 0/10. The wound margin is well defined. Wound bed has % pink, firm granulation. The periwound skin exhibited: Edema, Dry/Scaly, Erythema.  The periwound skin did not exhibit Warm  Capillary Refill: < 3 seconds  Erythema: No  Dependent Rubor: No  Hyperpigmentation: Yes  Lipodermatosclerosis: No  Right Extremity colors, hair growth, and conditions:  Extremity Color: Pigmented  Hair Growth on Extremity: No  Temperature of Extremi devitalized tissue: biofilm, fibrin, and slough. The following instrument(s) were used: blade and curette. Pain control was achieved using N/A. A time out was not conducted prior to the start of the procedure.  A minimal amount of bleeding was controlled wi is located on the right, medial lower leg. A Multi-Layer Compression Wrap procedure was performed. A Multi-Layer was applied with high 30-40 mmhg. The procedure was tolerated well.    General Notes:  Coflex 2 layer    Wound #9  Wound #9 (Venous Ulcer) is lo with saline or wound cleanser  Other skin sub: - theraskin / secure with staples / sorbact/ kerramax  Change dressing every: - 4 days    Additional Orders:    Compression Therapy:  Coflex 2 Layer  Compression to Right Leg.   Compression to Left Leg  Avoid p

## 2019-04-23 ENCOUNTER — OFFICE VISIT (OUTPATIENT)
Dept: WOUND CARE | Facility: HOSPITAL | Age: 74
End: 2019-04-23
Attending: NURSE PRACTITIONER
Payer: MEDICARE

## 2019-04-23 DIAGNOSIS — I87.331 CHRONIC VENOUS HYPERTENSION (IDIOPATHIC) WITH ULCER AND INFLAMMATION OF RIGHT LOWER EXTREMITY (HCC): ICD-10-CM

## 2019-04-23 DIAGNOSIS — S81.802D UNSPECIFIED OPEN WOUND, LEFT LOWER LEG, SUBSEQUENT ENCOUNTER: ICD-10-CM

## 2019-04-23 DIAGNOSIS — S81.801D UNSPECIFIED OPEN WOUND, RIGHT LOWER LEG, SUBSEQUENT ENCOUNTER: Primary | ICD-10-CM

## 2019-04-23 DIAGNOSIS — L97.919 CHRONIC VENOUS HYPERTENSION (IDIOPATHIC) WITH ULCER AND INFLAMMATION OF RIGHT LOWER EXTREMITY (HCC): ICD-10-CM

## 2019-04-23 PROCEDURE — 29581 APPL MULTLAYER CMPRN SYS LEG: CPT

## 2019-04-26 ENCOUNTER — OFFICE VISIT (OUTPATIENT)
Dept: WOUND CARE | Facility: HOSPITAL | Age: 74
End: 2019-04-26
Attending: NURSE PRACTITIONER
Payer: MEDICARE

## 2019-04-26 DIAGNOSIS — L97.919 CHRONIC VENOUS HYPERTENSION (IDIOPATHIC) WITH ULCER AND INFLAMMATION OF RIGHT LOWER EXTREMITY (HCC): ICD-10-CM

## 2019-04-26 DIAGNOSIS — S81.802D UNSPECIFIED OPEN WOUND, LEFT LOWER LEG, SUBSEQUENT ENCOUNTER: ICD-10-CM

## 2019-04-26 DIAGNOSIS — I87.331 CHRONIC VENOUS HYPERTENSION (IDIOPATHIC) WITH ULCER AND INFLAMMATION OF RIGHT LOWER EXTREMITY (HCC): ICD-10-CM

## 2019-04-26 DIAGNOSIS — S81.801D UNSPECIFIED OPEN WOUND, RIGHT LOWER LEG, SUBSEQUENT ENCOUNTER: Primary | ICD-10-CM

## 2019-04-26 PROCEDURE — 15271 SKIN SUB GRAFT TRNK/ARM/LEG: CPT

## 2019-04-26 PROCEDURE — 29581 APPL MULTLAYER CMPRN SYS LEG: CPT

## 2019-04-26 PROCEDURE — 15272 SKIN SUB GRAFT T/A/L ADD-ON: CPT

## 2019-04-26 NOTE — PROGRESS NOTES
Chief Complaint  This information was obtained from the patient  Patient is here for a wound care follow up. He denies any pain to the wounds. Allergies  NKDA    HPI  This information was obtained from the patient    4-26-19: Wound improving.  No s/o i eliza today. 2-1-19: Wounds all look improved overall. Will start skin substitutes on the most granular region. No s/o infection.    1-18-19: Wounds getting better overall - Cr slightly up - repeat BMP pending - he need refills on lasix and K which of care treatment at [facility] wound centre over last several years. He has been in compression wraps for last 5 years.    He has had apligraf placement and several other  skin subs at his previous clinic.     he is extremely compliant  with low carb lo amount of serous drainage noted which has no odor. The patient reports a wound pain of level 0/10. The wound margin is well defined. The periwound skin exhibited: Edema, Hemosiderosis.  The periwound skin did not exhibit: Dry/Scaly, Moist, Maceration, Randy exhibit signs or symptoms of infection. Local Pulse is Normal.    Wound #9 Left, Medial, Superior Lower Leg is a Full Thickness Venous Ulcer and has received a status of Not Healed. No tunneling has been noted. No sinus tract has been noted.  No undermining 30-40 mmhg. The procedure was tolerated well. General Notes:  Coflex 2 layer    Wound #7  Wound #7 (Venous Ulcer) is located on the right, medial lower leg.  A skin/subcutaneous tissue level excisional/surgical debridement with a total area debrided of 23 staples / sorbact/ kerramax  Change dressing every: - 4 days    Wound #6 Left, Medial Ankle     Wound Cleansing & Dressings  May shower with protection.   Cleanse with saline or wound cleanser  Other skin sub: - theraskin / secure with staples / sorbact/ ke

## 2019-04-30 ENCOUNTER — OFFICE VISIT (OUTPATIENT)
Dept: WOUND CARE | Facility: HOSPITAL | Age: 74
End: 2019-04-30
Attending: NURSE PRACTITIONER
Payer: MEDICARE

## 2019-04-30 DIAGNOSIS — S81.801D UNSPECIFIED OPEN WOUND, RIGHT LOWER LEG, SUBSEQUENT ENCOUNTER: Primary | ICD-10-CM

## 2019-04-30 DIAGNOSIS — I87.331 CHRONIC VENOUS HYPERTENSION (IDIOPATHIC) WITH ULCER AND INFLAMMATION OF RIGHT LOWER EXTREMITY (HCC): ICD-10-CM

## 2019-04-30 DIAGNOSIS — S81.802D UNSPECIFIED OPEN WOUND, LEFT LOWER LEG, SUBSEQUENT ENCOUNTER: ICD-10-CM

## 2019-04-30 DIAGNOSIS — L97.919 CHRONIC VENOUS HYPERTENSION (IDIOPATHIC) WITH ULCER AND INFLAMMATION OF RIGHT LOWER EXTREMITY (HCC): ICD-10-CM

## 2019-04-30 PROCEDURE — 29581 APPL MULTLAYER CMPRN SYS LEG: CPT

## 2019-05-03 ENCOUNTER — HOSPITAL ENCOUNTER (OUTPATIENT)
Dept: LAB | Facility: HOSPITAL | Age: 74
Discharge: HOME | End: 2019-05-03
Attending: INTERNAL MEDICINE
Payer: MEDICARE

## 2019-05-03 ENCOUNTER — OFFICE VISIT (OUTPATIENT)
Dept: WOUND CARE | Facility: HOSPITAL | Age: 74
End: 2019-05-03
Attending: NURSE PRACTITIONER
Payer: MEDICARE

## 2019-05-03 DIAGNOSIS — S81.801D UNSPECIFIED OPEN WOUND, RIGHT LOWER LEG, SUBSEQUENT ENCOUNTER: ICD-10-CM

## 2019-05-03 DIAGNOSIS — S81.801D WOUND OF RIGHT LOWER EXTREMITY, SUBSEQUENT ENCOUNTER: ICD-10-CM

## 2019-05-03 DIAGNOSIS — I87.331 CHRONIC VENOUS HYPERTENSION (IDIOPATHIC) WITH ULCER AND INFLAMMATION OF RIGHT LOWER EXTREMITY (HCC): ICD-10-CM

## 2019-05-03 DIAGNOSIS — L97.919 CHRONIC VENOUS HYPERTENSION (IDIOPATHIC) WITH ULCER AND INFLAMMATION OF RIGHT LOWER EXTREMITY (HCC): ICD-10-CM

## 2019-05-03 DIAGNOSIS — S81.802D UNSPECIFIED OPEN WOUND, LEFT LOWER LEG, SUBSEQUENT ENCOUNTER: ICD-10-CM

## 2019-05-03 DIAGNOSIS — S81.801D WOUND OF RIGHT LOWER EXTREMITY, SUBSEQUENT ENCOUNTER: Primary | ICD-10-CM

## 2019-05-03 PROCEDURE — 36415 COLL VENOUS BLD VENIPUNCTURE: CPT

## 2019-05-03 PROCEDURE — 86140 C-REACTIVE PROTEIN: CPT

## 2019-05-03 PROCEDURE — 29581 APPL MULTLAYER CMPRN SYS LEG: CPT

## 2019-05-03 PROCEDURE — 84134 ASSAY OF PREALBUMIN: CPT

## 2019-05-03 PROCEDURE — 11042 DBRDMT SUBQ TIS 1ST 20SQCM/<: CPT

## 2019-05-03 PROCEDURE — 15272 SKIN SUB GRAFT T/A/L ADD-ON: CPT

## 2019-05-03 PROCEDURE — 15271 SKIN SUB GRAFT TRNK/ARM/LEG: CPT

## 2019-05-03 PROCEDURE — 85652 RBC SED RATE AUTOMATED: CPT

## 2019-05-03 PROCEDURE — 85025 COMPLETE CBC W/AUTO DIFF WBC: CPT

## 2019-05-03 PROCEDURE — 11045 DBRDMT SUBQ TISS EACH ADDL: CPT

## 2019-05-03 PROCEDURE — 80053 COMPREHEN METABOLIC PANEL: CPT

## 2019-05-03 RX ORDER — SULFAMETHOXAZOLE AND TRIMETHOPRIM 400; 80 MG/1; MG/1
1 TABLET ORAL DAILY
Qty: 30 TABLET | Refills: 0 | Status: SHIPPED | OUTPATIENT
Start: 2019-05-03 | End: 2019-05-21

## 2019-05-03 RX ORDER — FUROSEMIDE 40 MG/1
40 TABLET ORAL 2 TIMES DAILY
Qty: 60 TABLET | Refills: 0 | Status: SHIPPED | OUTPATIENT
Start: 2019-05-03 | End: 2019-05-21

## 2019-05-03 RX ORDER — POTASSIUM CHLORIDE 750 MG/1
10 TABLET, FILM COATED, EXTENDED RELEASE ORAL 2 TIMES DAILY
Qty: 60 TABLET | Refills: 0 | Status: SHIPPED | OUTPATIENT
Start: 2019-05-03 | End: 2019-05-21

## 2019-05-03 NOTE — PROGRESS NOTES
Chief Complaint  This information was obtained from the patient  Patient is here for a follow up for bilateral lower legs wounds. Pt states no new complaints or pain. Allergies  NKDA    HPI  This information was obtained from the patient    5-3-19:  Al areas from theraskin application last week - were rinsed and cleaned and covered with new outer dressing. Several other wounds had first application of theraskin today. 2-1-19: Wounds all look improved overall.  Will start skin substitutes on the most g lower extremtiies and has had several treatments for the same - nothing more to do per his vein specialist.   he has had pretty much all available standard of care treatment at [facility] wound centre over last several years.    He has been in compression status of Not Healed. There is a moderate amount of serous drainage noted which has no odor. The patient reports a wound pain of level 0/10. The wound margin is well defined. The periwound skin exhibited: Edema, Hemosiderosis.  The periwound skin did not Thickness Venous Ulcer and has received a status of Not Healed. There is a moderate amount of serous drainage noted which has no odor. The patient reports a wound pain of level 0/10. The wound margin is well defined.    The periwound skin exhibited: Edema, curette and forceps. Pain control was achieved using N/A. A time out was not conducted prior to the start of the procedure. A minimal amount of bleeding was controlled with pressure.  The procedure was tolerated well with a pain level of 0 throughout and a well with a pain level of 0 throughout and a pain level of 0 following the procedure. General Notes:  TheraSkin divided between left lateral leg, left medial ankle and leg wounds.  [facility]    Wound #7  Wound #7 (Venous Ulcer) is located on the right, me wound cleanser  Kerramax/Super absorbent  Other: - adaptic  Change dressing every: - 4 days    Wound #8 Left Second Toe     Wound Cleansing & Dressings  Cleanse with saline or wound cleanser  Hydrofera ready  Medipore tape (no substitution)     Wound #9 Ej Ratel

## 2019-05-07 ENCOUNTER — APPOINTMENT (OUTPATIENT)
Dept: WOUND CARE | Facility: HOSPITAL | Age: 74
End: 2019-05-07
Attending: NURSE PRACTITIONER
Payer: MEDICARE

## 2019-05-08 ENCOUNTER — OFFICE VISIT (OUTPATIENT)
Dept: WOUND CARE | Facility: HOSPITAL | Age: 74
End: 2019-05-08
Attending: NURSE PRACTITIONER
Payer: MEDICARE

## 2019-05-08 DIAGNOSIS — S81.801D WOUND OF RIGHT LOWER EXTREMITY, SUBSEQUENT ENCOUNTER: Primary | ICD-10-CM

## 2019-05-08 DIAGNOSIS — S81.801D UNSPECIFIED OPEN WOUND, RIGHT LOWER LEG, SUBSEQUENT ENCOUNTER: ICD-10-CM

## 2019-05-08 DIAGNOSIS — I87.331 CHRONIC VENOUS HYPERTENSION (IDIOPATHIC) WITH ULCER AND INFLAMMATION OF RIGHT LOWER EXTREMITY (HCC): ICD-10-CM

## 2019-05-08 DIAGNOSIS — L97.919 CHRONIC VENOUS HYPERTENSION (IDIOPATHIC) WITH ULCER AND INFLAMMATION OF RIGHT LOWER EXTREMITY (HCC): ICD-10-CM

## 2019-05-08 PROCEDURE — 15271 SKIN SUB GRAFT TRNK/ARM/LEG: CPT

## 2019-05-08 PROCEDURE — 15272 SKIN SUB GRAFT T/A/L ADD-ON: CPT

## 2019-05-08 PROCEDURE — 29581 APPL MULTLAYER CMPRN SYS LEG: CPT

## 2019-05-08 NOTE — PROGRESS NOTES
Chief Complaint  This information was obtained from the patient  Patient is here for a follow up for bilateral lower legs wounds. Pt states no new complaints or pain. Allergies  NKDA    HPI  This information was obtained from the patient    5-8-19:  Wo overall improving. No s/o infection. pedal edema down  Wound areas from theraskin application last week - were rinsed and cleaned and covered with new outer dressing. Several other wounds had first application of theraskin today.    2-1-19: Wounds all giving me, he as been diagnosed with venous reflux disease of lower extremtiies and has had several treatments for the same - nothing more to do per his vein specialist.   he has had pretty much all available standard of care treatment at [facility] wound received a status of Not Healed. There is a small amount of serous drainage noted which has no odor. The patient reports a wound pain of level 0/10. The wound margin is well defined. The periwound skin exhibited: Edema, Hemosiderosis.  The periwound skin Dry/Scaly. The periwound skin did not exhibit: Moist, Maceration, Erythema. The temperature of the periwound skin is WNL. Periwound skin does not exhibit signs or symptoms of infection.  Local Pulse is Normal.    Wound #9 Left, Medial, Superior Lower Leg is No  Dependent Rubor: No  Lipodermatosclerosis: No          Assessment    Active Problems    ICD-10  (Encounter Diagnosis) S81.801D - Unspecified open wound, right lower leg, subsequent encounter  (Encounter Diagnosis) S81.802D - Unspecified open wound, lef [facility]. A time out was conducted prior to the start of the procedure. The procedure was tolerated well with a pain level of 0 throughout and a pain level of 0 following the procedure.     Wound #7 (Venous Ulcer) is located on the right, medial l assessment of wound(s). - friday next week    Misc/Additional Orders  Supplement with a daily multivitamin. Increase dietary protein    Care summary  Discussed the Plan of Care at bedside with patient.  The patient verbally acknowledges understanding of al

## 2019-05-10 ENCOUNTER — OFFICE VISIT (OUTPATIENT)
Dept: WOUND CARE | Facility: HOSPITAL | Age: 74
End: 2019-05-10
Attending: NURSE PRACTITIONER
Payer: MEDICARE

## 2019-05-10 DIAGNOSIS — S81.801D WOUND OF RIGHT LOWER EXTREMITY, SUBSEQUENT ENCOUNTER: Primary | ICD-10-CM

## 2019-05-10 PROCEDURE — 29581 APPL MULTLAYER CMPRN SYS LEG: CPT

## 2019-05-14 ENCOUNTER — OFFICE VISIT (OUTPATIENT)
Dept: WOUND CARE | Facility: HOSPITAL | Age: 74
End: 2019-05-14
Attending: NURSE PRACTITIONER
Payer: MEDICARE

## 2019-05-14 DIAGNOSIS — S81.801D UNSPECIFIED OPEN WOUND, RIGHT LOWER LEG, SUBSEQUENT ENCOUNTER: ICD-10-CM

## 2019-05-14 DIAGNOSIS — I87.331 CHRONIC VENOUS HYPERTENSION (IDIOPATHIC) WITH ULCER AND INFLAMMATION OF RIGHT LOWER EXTREMITY (HCC): ICD-10-CM

## 2019-05-14 DIAGNOSIS — L97.919 CHRONIC VENOUS HYPERTENSION (IDIOPATHIC) WITH ULCER AND INFLAMMATION OF RIGHT LOWER EXTREMITY (HCC): ICD-10-CM

## 2019-05-14 DIAGNOSIS — S81.801D WOUND OF RIGHT LOWER EXTREMITY, SUBSEQUENT ENCOUNTER: Primary | ICD-10-CM

## 2019-05-14 PROCEDURE — 29581 APPL MULTLAYER CMPRN SYS LEG: CPT

## 2019-05-17 ENCOUNTER — HOSPITAL ENCOUNTER (OUTPATIENT)
Dept: LAB | Facility: HOSPITAL | Age: 74
Discharge: HOME | End: 2019-05-17
Attending: INTERNAL MEDICINE
Payer: MEDICARE

## 2019-05-17 ENCOUNTER — OFFICE VISIT (OUTPATIENT)
Dept: WOUND CARE | Facility: HOSPITAL | Age: 74
End: 2019-05-17
Attending: NURSE PRACTITIONER
Payer: MEDICARE

## 2019-05-17 DIAGNOSIS — S81.801D WOUND OF RIGHT LOWER EXTREMITY, SUBSEQUENT ENCOUNTER: ICD-10-CM

## 2019-05-17 DIAGNOSIS — S81.801D WOUND OF RIGHT LOWER EXTREMITY, SUBSEQUENT ENCOUNTER: Primary | ICD-10-CM

## 2019-05-17 DIAGNOSIS — S81.801D UNSPECIFIED OPEN WOUND, RIGHT LOWER LEG, SUBSEQUENT ENCOUNTER: ICD-10-CM

## 2019-05-17 PROCEDURE — 84466 ASSAY OF TRANSFERRIN: CPT

## 2019-05-17 PROCEDURE — 29581 APPL MULTLAYER CMPRN SYS LEG: CPT

## 2019-05-17 PROCEDURE — 15272 SKIN SUB GRAFT T/A/L ADD-ON: CPT

## 2019-05-17 PROCEDURE — 85025 COMPLETE CBC W/AUTO DIFF WBC: CPT

## 2019-05-17 PROCEDURE — 80053 COMPREHEN METABOLIC PANEL: CPT

## 2019-05-17 PROCEDURE — 15271 SKIN SUB GRAFT TRNK/ARM/LEG: CPT

## 2019-05-17 PROCEDURE — 36415 COLL VENOUS BLD VENIPUNCTURE: CPT

## 2019-05-17 NOTE — PROGRESS NOTES
Chief Complaint  This information was obtained from the patient  Patient is here for a nurse visit for bilateral lower legs wounds. Pt states no new complaints or pain.     Allergies  NKDA    HPI  This information was obtained from the patient    5-17-19: infection - no significant change in dimensions though. 2-9-19: Wounds are overall improving. No s/o infection. pedal edema down  Wound areas from theraskin application last week - were rinsed and cleaned and covered with new outer dressing.    Several joint replacement surgeries ( hip / knee).   based on the history that he is giving me, he as been diagnosed with venous reflux disease of lower extremtiies and has had several treatments for the same - nothing more to do per his vein specialist.   he has h granulation. The periwound skin exhibited: Edema, Dry/Scaly, Hemosiderosis. The periwound skin did not exhibit: Erythema. The temperature of the periwound skin is Warm. Periwound skin does not exhibit signs or symptoms of infection.  Local Pulse is Normal tunneling has been noted. No sinus tract has been noted. No undermining has been noted. There was no drainage noted. The patient reports a wound pain of level 0/10. Wound bed has % pink, spongy granulation.    The periwound skin exhibited: Edema, Dry/ cm  Vascular Assessment:  Left Extremity colors, hair growth, and conditions:  Extremity Color: Pigmented  Hair Growth on Extremity: No  Temperature of Extremity: Warm  Capillary Refill: < 3 seconds  Erythema: No  Dependent Rubor: No  Hyperpigmentation: Ena Monteiro is located on the right, medial lower leg. A Multi-Layer Compression Wrap procedure was performed. A Multi-Layer was applied with high 30-40 mmhg. The procedure was tolerated well.    General Notes:  Coflex 2 layer        Plan    Wound Orders:  Wound #4 Lef steristrips  Change dressing every: - 4 days    Wound #9 Left, Medial, Superior Lower Leg     Topicals:  Initial Anesthetic Order: Apply lidocaine to wound bed on all future wound center visits during preparation for physician exam if wound bed contains fi

## 2019-05-21 ENCOUNTER — OFFICE VISIT (OUTPATIENT)
Dept: WOUND CARE | Facility: HOSPITAL | Age: 74
End: 2019-05-21
Attending: NURSE PRACTITIONER
Payer: MEDICARE

## 2019-05-21 DIAGNOSIS — L97.919 CHRONIC VENOUS HYPERTENSION (IDIOPATHIC) WITH ULCER AND INFLAMMATION OF RIGHT LOWER EXTREMITY (HCC): ICD-10-CM

## 2019-05-21 DIAGNOSIS — I87.331 CHRONIC VENOUS HYPERTENSION (IDIOPATHIC) WITH ULCER AND INFLAMMATION OF RIGHT LOWER EXTREMITY (HCC): ICD-10-CM

## 2019-05-21 DIAGNOSIS — S81.801D WOUND OF RIGHT LOWER EXTREMITY, SUBSEQUENT ENCOUNTER: Primary | ICD-10-CM

## 2019-05-21 DIAGNOSIS — S81.801D UNSPECIFIED OPEN WOUND, RIGHT LOWER LEG, SUBSEQUENT ENCOUNTER: ICD-10-CM

## 2019-05-21 PROCEDURE — 29581 APPL MULTLAYER CMPRN SYS LEG: CPT

## 2019-05-24 ENCOUNTER — OFFICE VISIT (OUTPATIENT)
Dept: WOUND CARE | Facility: HOSPITAL | Age: 74
End: 2019-05-24
Attending: NURSE PRACTITIONER
Payer: MEDICARE

## 2019-05-24 DIAGNOSIS — S81.801D UNSPECIFIED OPEN WOUND, RIGHT LOWER LEG, SUBSEQUENT ENCOUNTER: ICD-10-CM

## 2019-05-24 DIAGNOSIS — S81.801D WOUND OF RIGHT LOWER EXTREMITY, SUBSEQUENT ENCOUNTER: Primary | ICD-10-CM

## 2019-05-24 DIAGNOSIS — L97.919 CHRONIC VENOUS HYPERTENSION (IDIOPATHIC) WITH ULCER AND INFLAMMATION OF RIGHT LOWER EXTREMITY (HCC): ICD-10-CM

## 2019-05-24 DIAGNOSIS — I87.331 CHRONIC VENOUS HYPERTENSION (IDIOPATHIC) WITH ULCER AND INFLAMMATION OF RIGHT LOWER EXTREMITY (HCC): ICD-10-CM

## 2019-05-24 PROCEDURE — 15271 SKIN SUB GRAFT TRNK/ARM/LEG: CPT

## 2019-05-24 PROCEDURE — 29581 APPL MULTLAYER CMPRN SYS LEG: CPT

## 2019-05-24 PROCEDURE — 15272 SKIN SUB GRAFT T/A/L ADD-ON: CPT

## 2019-05-24 NOTE — PROGRESS NOTES
Chief Complaint  This information was obtained from the patient  Patient is here for a follow up for bilateral lower legs wounds. No issues or concerns.     Allergies  NKDA    HPI  This information was obtained from the patient    5-22-19: Wound improved prophylactic bactrim   2-15-19: Wounds all stable - seems improving- no s/o infection - no significant change in dimensions though. 2-9-19: Wounds are overall improving. No s/o infection.    pedal edema down  Wound areas from theraskin application last we - on and off, but mostly persistent since 2013. He says they started after joint replacement surgeries ( hip / knee).   based on the history that he is giving me, he as been diagnosed with venous reflux disease of lower extremtiies and has had several ann Erythema. The temperature of the periwound skin is Warm. Periwound skin does not exhibit signs or symptoms of infection.  Local Pulse is Normal.   General Notes:  silvana kay, unable to visualize wound bed    Wound #6 Left, Medial Ankle is a chron tract has been noted. No undermining has been noted. There is a scant amount of serous drainage noted which has no odor. The patient reports a wound pain of level 0/10. Wound bed has % bright red, firm granulation.    The periwound skin exhibited: Bartolo Furlough seconds  Erythema: No  Dependent Rubor: No  Hyperpigmentation: Yes  Lipodermatosclerosis: No  Right Extremity colors, hair growth, and conditions:  Extremity Color: Pigmented  Hair Growth on Extremity: No  Temperature of Extremity: Warm  Capillary Refill:  size E over        Plan    Wound Orders:  Wound #4 Left, Lateral Lower Leg     Topicals:  Initial Anesthetic Order: Apply lidocaine to wound bed on all future wound center visits during preparation for physician exam if wound bed contains fibrin or es prolonged standing in one place. Elevate leg(s)as much as possible. Take you diuretics as directed. Follow-Up Appointments  Return Appointment in 1 week. RN visit for assessment of wound(s).  - tue    Misc/Additional Orders  Supplement with a daily

## 2019-05-28 ENCOUNTER — OFFICE VISIT (OUTPATIENT)
Dept: WOUND CARE | Facility: HOSPITAL | Age: 74
End: 2019-05-28
Attending: NURSE PRACTITIONER
Payer: MEDICARE

## 2019-05-28 DIAGNOSIS — S81.801D UNSPECIFIED OPEN WOUND, RIGHT LOWER LEG, SUBSEQUENT ENCOUNTER: ICD-10-CM

## 2019-05-28 DIAGNOSIS — S81.801D WOUND OF RIGHT LOWER EXTREMITY, SUBSEQUENT ENCOUNTER: Primary | ICD-10-CM

## 2019-05-28 DIAGNOSIS — S81.802D UNSPECIFIED OPEN WOUND, LEFT LOWER LEG, SUBSEQUENT ENCOUNTER: ICD-10-CM

## 2019-05-28 DIAGNOSIS — L97.919 CHRONIC VENOUS HYPERTENSION (IDIOPATHIC) WITH ULCER AND INFLAMMATION OF RIGHT LOWER EXTREMITY (HCC): ICD-10-CM

## 2019-05-28 DIAGNOSIS — I87.331 CHRONIC VENOUS HYPERTENSION (IDIOPATHIC) WITH ULCER AND INFLAMMATION OF RIGHT LOWER EXTREMITY (HCC): ICD-10-CM

## 2019-05-28 PROCEDURE — 29581 APPL MULTLAYER CMPRN SYS LEG: CPT

## 2019-05-31 ENCOUNTER — OFFICE VISIT (OUTPATIENT)
Dept: WOUND CARE | Facility: HOSPITAL | Age: 74
End: 2019-05-31
Attending: NURSE PRACTITIONER
Payer: MEDICARE

## 2019-05-31 ENCOUNTER — HOSPITAL ENCOUNTER (OUTPATIENT)
Dept: GENERAL RADIOLOGY | Facility: HOSPITAL | Age: 74
Discharge: HOME | End: 2019-05-31
Attending: INTERNAL MEDICINE
Payer: MEDICARE

## 2019-05-31 DIAGNOSIS — S91.105D OPEN WOUND OF SECOND TOE OF LEFT FOOT, SUBSEQUENT ENCOUNTER: ICD-10-CM

## 2019-05-31 DIAGNOSIS — S81.802D UNSPECIFIED OPEN WOUND, LEFT LOWER LEG, SUBSEQUENT ENCOUNTER: ICD-10-CM

## 2019-05-31 DIAGNOSIS — S91.105D OPEN WOUND OF SECOND TOE OF LEFT FOOT, SUBSEQUENT ENCOUNTER: Primary | ICD-10-CM

## 2019-05-31 PROCEDURE — 87070 CULTURE OTHR SPECIMN AEROBIC: CPT

## 2019-05-31 PROCEDURE — 29581 APPL MULTLAYER CMPRN SYS LEG: CPT

## 2019-05-31 PROCEDURE — 73660 X-RAY EXAM OF TOE(S): CPT | Performed by: INTERNAL MEDICINE

## 2019-05-31 PROCEDURE — 87077 CULTURE AEROBIC IDENTIFY: CPT

## 2019-05-31 PROCEDURE — 87205 SMEAR GRAM STAIN: CPT

## 2019-05-31 PROCEDURE — 87186 SC STD MICRODIL/AGAR DIL: CPT

## 2019-05-31 RX ORDER — METOLAZONE 2.5 MG/1
TABLET ORAL
Qty: 2 TABLET | Refills: 0 | Status: SHIPPED | OUTPATIENT
Start: 2019-05-31 | End: 2019-06-07

## 2019-05-31 NOTE — PROGRESS NOTES
Chief Complaint  This information was obtained from the patient  Patient is here for a follow up for bilateral lower legs wounds. No issues or concerns.     Allergies  NKDA    HPI  This information was obtained from the patient    5-31-19: Wounds on left doing good. no s/o infection - he will need refill on lasix and K.    2-22-19: Major wounds covered with theraskin stays intact. right medial and left medial leg wounds still with slough  -debrided - dressed with honey.    last labs --> cr=1.3- on lasix much bigger - seems like he is not tolerating Coflex 2. Need to r/o infeciton - he is on 7 day course of doxycycline.      11-7-18:  WOUND CLINIC CONSULTATION INITIAL VISIT    74 YO CM  here for evaluation and management of bilateral lower extremity ulcer width x 0.1cm depth, with an area of 14.75 sq cm and a volume of 1.475 cubic cm. No tunneling has been noted. No sinus tract has been noted. No undermining has been noted. There is a small amount of serous drainage noted which has no odor.  The patient repo Maceration, Erythema. The temperature of the periwound skin is WNL. Periwound skin does not exhibit signs or symptoms of infection.  Local Pulse is Normal.   General Notes:  Scabbed    Wound #9 Left, Medial Lower Leg is a Full Thickness Venous Ulcer and has conditions:  Extremity Color: Pigmented  Hair Growth on Extremity: No  Temperature of Extremity: Warm  Capillary Refill: < 3 seconds  Erythema: No  Dependent Rubor: No  Hyperpigmentation: Yes  Lipodermatosclerosis: No          Assessment    Active Problems therrory  Sorbact  Hydrofera Transfer   Kerramax/Super absorbent  Change dressing every: - 4 days    Wound #8 Left Second Toe     Topicals:  Initial Anesthetic Order: Apply lidocaine to wound bed on all future wound center visits during preparation for ph

## 2019-06-03 ENCOUNTER — TELEPHONE (OUTPATIENT)
Dept: WOUND CARE | Facility: HOSPITAL | Age: 74
End: 2019-06-03

## 2019-06-03 RX ORDER — GENTAMICIN SULFATE 3 MG/ML
SOLUTION/ DROPS OPHTHALMIC
Qty: 15 ML | Refills: 5 | Status: SHIPPED | OUTPATIENT
Start: 2019-06-03 | End: 2021-10-29

## 2019-06-03 NOTE — TELEPHONE ENCOUNTER
Staph aureus 1 +  Will treat due to clinical deterioration. Gent topically x 2 weeks.    D/w Kavita Tan

## 2019-06-04 ENCOUNTER — OFFICE VISIT (OUTPATIENT)
Dept: WOUND CARE | Facility: HOSPITAL | Age: 74
End: 2019-06-04
Attending: NURSE PRACTITIONER
Payer: MEDICARE

## 2019-06-04 DIAGNOSIS — S81.801D WOUND OF RIGHT LOWER EXTREMITY, SUBSEQUENT ENCOUNTER: ICD-10-CM

## 2019-06-04 DIAGNOSIS — L97.919 CHRONIC VENOUS HYPERTENSION (IDIOPATHIC) WITH ULCER AND INFLAMMATION OF RIGHT LOWER EXTREMITY (HCC): ICD-10-CM

## 2019-06-04 DIAGNOSIS — I87.331 CHRONIC VENOUS HYPERTENSION (IDIOPATHIC) WITH ULCER AND INFLAMMATION OF RIGHT LOWER EXTREMITY (HCC): ICD-10-CM

## 2019-06-04 DIAGNOSIS — S91.105D OPEN WOUND OF SECOND TOE OF LEFT FOOT, SUBSEQUENT ENCOUNTER: Primary | ICD-10-CM

## 2019-06-04 DIAGNOSIS — S81.802D UNSPECIFIED OPEN WOUND, LEFT LOWER LEG, SUBSEQUENT ENCOUNTER: ICD-10-CM

## 2019-06-04 PROCEDURE — 29581 APPL MULTLAYER CMPRN SYS LEG: CPT

## 2019-06-07 ENCOUNTER — OFFICE VISIT (OUTPATIENT)
Dept: WOUND CARE | Facility: HOSPITAL | Age: 74
End: 2019-06-07
Attending: NURSE PRACTITIONER
Payer: MEDICARE

## 2019-06-07 DIAGNOSIS — S81.802D UNSPECIFIED OPEN WOUND, LEFT LOWER LEG, SUBSEQUENT ENCOUNTER: ICD-10-CM

## 2019-06-07 DIAGNOSIS — S81.801S WOUND OF RIGHT LOWER EXTREMITY, SEQUELA: Primary | ICD-10-CM

## 2019-06-07 DIAGNOSIS — S91.105D OPEN WOUND OF SECOND TOE OF LEFT FOOT, SUBSEQUENT ENCOUNTER: ICD-10-CM

## 2019-06-07 PROCEDURE — 11045 DBRDMT SUBQ TISS EACH ADDL: CPT

## 2019-06-07 PROCEDURE — 11042 DBRDMT SUBQ TIS 1ST 20SQCM/<: CPT

## 2019-06-07 RX ORDER — METOLAZONE 2.5 MG/1
TABLET ORAL
Qty: 2 TABLET | Refills: 0 | Status: SHIPPED | OUTPATIENT
Start: 2019-06-07 | End: 2019-06-14

## 2019-06-07 NOTE — PROGRESS NOTES
Chief Complaint  This information was obtained from the patient  Patient is here for a wound care follow up. He denies any pain or new wound concerns.     Allergies  NKDA    HPI  This information was obtained from the patient    6-7-19: Wounds stable - wo patriciain looks v good  -looks like graft is taking - well adherent - did not reapply on those wounds today. We started theraskin on the major wound on the right leg and on the small medial left ankle wound. overall he is doing good.    no s/o infection burned. Leg measurements much better - wounds looks improved    11-16-18:  Wounds have deteriorated significantly since last visit - Significant thick slough / necrotic tissue on the larger wounds of left leg - leg circumference much bigger - seems like h (Hair, Skin)  Wound #4 Left, Lateral Lower Leg is a chronic Full Thickness Venous Ulcer and has received a status of Not Healed.  Subsequent wound encounter measurements are 4.5cm length x 6.4cm width x 0.1cm depth, with an area of 28.8 sq cm and a volume o symptoms of infection. Local Pulse is Normal.    Wound #9 Left, Medial Lower Leg is a Full Thickness Venous Ulcer and has received a status of Not Healed.  Subsequent wound encounter measurements are 10.5cm length x 5.9cm width x 0.1cm depth, with an area o and inflammation of bilateral lower extremity  (Encounter Diagnosis) R60.0 - Localized edema        Procedures    Wound #7  Wound #7 (Venous Ulcer) is located on the right, medial lower leg.  A skin/subcutaneous tissue level excisional/surgical debridement Dressings  May shower with protection.   Cleanse with saline or wound cleanser  Antibiotic Ointment/Cream.   Kerramax/Super absorbent  ABD pad  Other: - Gentamicin ointment to wound 1-2 times daily- secure with gauze  Change Dressing Daily and PRN    Wound 6/7/2020    Refill Metolazone.    RTC 1 week

## 2019-06-11 ENCOUNTER — APPOINTMENT (OUTPATIENT)
Dept: WOUND CARE | Facility: HOSPITAL | Age: 74
End: 2019-06-11
Attending: NURSE PRACTITIONER
Payer: MEDICARE

## 2019-06-14 ENCOUNTER — OFFICE VISIT (OUTPATIENT)
Dept: WOUND CARE | Facility: HOSPITAL | Age: 74
End: 2019-06-14
Attending: NURSE PRACTITIONER
Payer: MEDICARE

## 2019-06-14 DIAGNOSIS — S81.802D UNSPECIFIED OPEN WOUND, LEFT LOWER LEG, SUBSEQUENT ENCOUNTER: Primary | ICD-10-CM

## 2019-06-14 DIAGNOSIS — S91.105D OPEN WOUND OF SECOND TOE OF LEFT FOOT, SUBSEQUENT ENCOUNTER: ICD-10-CM

## 2019-06-14 DIAGNOSIS — S81.801D WOUND OF RIGHT LOWER EXTREMITY, SUBSEQUENT ENCOUNTER: ICD-10-CM

## 2019-06-14 PROCEDURE — 97598 DBRDMT OPN WND ADDL 20CM/<: CPT

## 2019-06-14 PROCEDURE — 97597 DBRDMT OPN WND 1ST 20 CM/<: CPT

## 2019-06-14 RX ORDER — METOLAZONE 2.5 MG/1
TABLET ORAL
Qty: 2 TABLET | Refills: 0 | Status: SHIPPED | OUTPATIENT
Start: 2019-06-14 | End: 2019-06-21

## 2019-06-19 NOTE — PROGRESS NOTES
Chief Complaint  This information was obtained from the patient  Patient is here for a wound care follow up. He denies any concerns or pain.     Allergies  NKDA    HPI  This information was obtained from the patient    6-14-19: WOund stable - using gentam place.   3-1-19: Wounds improved - left leg wounds where we had applied theraskin looks v good  -looks like graft is taking - well adherent - did not reapply on those wounds today.    We started theraskin on the major wound on the right leg and on the small well - had pain and rash along ankle. Did not like iodosorb - it burned. Leg measurements much better - wounds looks improved    11-16-18:  Wounds have deteriorated significantly since last visit - Significant thick slough / necrotic tissue on the large Skin)  Wound #4 Left, Lateral Lower Leg is a chronic Full Thickness Venous Ulcer and has received a status of Not Healed.  Subsequent wound encounter measurements are 3.5cm length x 5.5cm width x 0.1cm depth, with an area of 19.25 sq cm and a volume of 1.92 tract has been noted. No undermining has been noted. There is a moderate amount of sero-sanguineous drainage noted which has no odor. The patient reports a wound pain of level 0/10. The wound margin is well defined.  Wound bed has 1-25% slough, 51-75% brigh fibrin, and slough. The following instrument(s) were used: forceps and scissors. Pain control was achieved using N/A. A time out was not conducted prior to the start of the procedure. A moderate amount of bleeding was controlled with pressure.  The procedur Orders:  Wound #4 Left, Lateral Lower Leg     Topicals:  Initial Anesthetic Order: Apply lidocaine to wound bed on all future wound center visits during preparation for physician exam if wound bed contains fibrin or eschar.   4% Topical Lidocaine    Wound C the Plan of Care at bedside with patient. The patient verbally acknowledges understanding of all instructions and all questions were answered.  - consult dr. Anna Astudillo - appt 6/25

## 2019-06-21 ENCOUNTER — OFFICE VISIT (OUTPATIENT)
Dept: WOUND CARE | Facility: HOSPITAL | Age: 74
End: 2019-06-21
Attending: INTERNAL MEDICINE
Payer: MEDICARE

## 2019-06-21 ENCOUNTER — HOSPITAL ENCOUNTER (OUTPATIENT)
Dept: LAB | Facility: HOSPITAL | Age: 74
Discharge: HOME | End: 2019-06-21
Attending: INTERNAL MEDICINE
Payer: MEDICARE

## 2019-06-21 DIAGNOSIS — S81.802D UNSPECIFIED OPEN WOUND, LEFT LOWER LEG, SUBSEQUENT ENCOUNTER: ICD-10-CM

## 2019-06-21 DIAGNOSIS — R60.0 LOCALIZED EDEMA: Primary | ICD-10-CM

## 2019-06-21 DIAGNOSIS — R60.0 LOCALIZED EDEMA: ICD-10-CM

## 2019-06-21 DIAGNOSIS — S81.801D WOUND OF RIGHT LOWER EXTREMITY, SUBSEQUENT ENCOUNTER: ICD-10-CM

## 2019-06-21 PROCEDURE — 97597 DBRDMT OPN WND 1ST 20 CM/<: CPT

## 2019-06-21 PROCEDURE — 36415 COLL VENOUS BLD VENIPUNCTURE: CPT

## 2019-06-21 PROCEDURE — 80048 BASIC METABOLIC PNL TOTAL CA: CPT

## 2019-06-21 PROCEDURE — 97598 DBRDMT OPN WND ADDL 20CM/<: CPT

## 2019-06-21 RX ORDER — METOLAZONE 2.5 MG/1
TABLET ORAL
Qty: 2 TABLET | Refills: 0 | Status: SHIPPED | OUTPATIENT
Start: 2019-06-21 | End: 2019-06-28

## 2019-06-21 NOTE — PROGRESS NOTES
Chief Complaint  This information was obtained from the patient  Patient is here for a wound care follow up. He denies any concerns or pain.  Dressing changes \"good well\"    Allergies  NKDA    HPI  This information was obtained from the patient    6-21- 3-8-19: Wounds look awesome - shrunk significantly - legs not swollen anymore - no redness either. Theraskin in place.    3-1-19: Wounds improved - left leg wounds where we had applied theraskin looks v good  -looks like graft is taking - well adherent better. 11-21-18: Wound culture + enterobacter - started CIPRO - will DC doxycycline. He did not tolerate comprilan well - had pain and rash along ankle. Did not like iodosorb - it burned.    Leg measurements much better - wounds looks improved    1 96.6 °F (35.89 °C), Pulse: 56 bpm, Respiratory Rate: 16 breaths/min, Blood Pressure: 98/64 mmHg. Integumentary (Hair, Skin)  Wound #4 Left, Lateral Lower Leg is a chronic Full Thickness Venous Ulcer and has received a status of Not Healed.  Subsequent w wound encounter measurements are 9.5cm length x 5.3cm width x 0.1cm depth, with an area of 50.35 sq cm and a volume of 5.035 cubic cm. No tunneling has been noted. No sinus tract has been noted. No undermining has been noted.  There is a small amount of ser left, lateral lower leg. A selective debridement with a total area debrided of 12.47 sq cm was performed by Lucy Law MD. to remove devitalized tissue: biofilm, fibrin, and slough. The following instrument(s) were used: curette.  Pain control wa Measurements: 9.5cm length x 5.3cm width x 0.1cm depth; with an area of 50.35 sq cm and a volume of 5.035 cubic cm;        Plan    Wound Orders:  Wound #4 Left, Lateral Lower Leg     Topicals:  Initial Anesthetic Order: Apply lidocaine to wound bed on all minute time slot. Misc/Additional Orders  Supplement with a daily multivitamin. Increase dietary protein    Care summary  Discussed the Plan of Care at bedside with patient.  The patient verbally acknowledges understanding of all instructions and all qu

## 2019-06-25 ENCOUNTER — OFFICE VISIT (OUTPATIENT)
Dept: SURGERY | Facility: CLINIC | Age: 74
End: 2019-06-25
Payer: MEDICARE

## 2019-06-25 VITALS — BODY MASS INDEX: 34.22 KG/M2 | HEIGHT: 73.5 IN | WEIGHT: 263.81 LBS

## 2019-06-25 DIAGNOSIS — I83.029 VENOUS STASIS ULCERS OF BOTH LOWER EXTREMITIES (HCC): Primary | ICD-10-CM

## 2019-06-25 DIAGNOSIS — L97.929 VENOUS STASIS ULCERS OF BOTH LOWER EXTREMITIES (HCC): Primary | ICD-10-CM

## 2019-06-25 DIAGNOSIS — I83.019 VENOUS STASIS ULCERS OF BOTH LOWER EXTREMITIES (HCC): Primary | ICD-10-CM

## 2019-06-25 DIAGNOSIS — L97.919 VENOUS STASIS ULCERS OF BOTH LOWER EXTREMITIES (HCC): Primary | ICD-10-CM

## 2019-06-25 PROBLEM — I83.009 VENOUS ULCER OF LEG (HCC): Status: ACTIVE | Noted: 2019-06-25

## 2019-06-25 PROBLEM — L97.909 VENOUS ULCER OF LEG (HCC): Status: ACTIVE | Noted: 2019-06-25

## 2019-06-25 PROCEDURE — 99202 OFFICE O/P NEW SF 15 MIN: CPT | Performed by: SURGERY

## 2019-06-25 RX ORDER — SULFAMETHOXAZOLE AND TRIMETHOPRIM 400; 80 MG/1; MG/1
1 TABLET ORAL
COMMUNITY
Start: 2019-06-07 | End: 2019-08-19

## 2019-06-25 RX ORDER — MONTELUKAST SODIUM 10 MG/1
TABLET ORAL
Refills: 1 | COMMUNITY
Start: 2019-06-20

## 2019-06-25 RX ORDER — OMEPRAZOLE 20 MG/1
CAPSULE, DELAYED RELEASE ORAL
Refills: 5 | COMMUNITY
Start: 2019-06-20 | End: 2021-05-14

## 2019-06-25 RX ORDER — ALBUTEROL SULFATE 90 UG/1
1-2 AEROSOL, METERED RESPIRATORY (INHALATION)
COMMUNITY
Start: 2018-07-26

## 2019-06-25 RX ORDER — PENTOXIFYLLINE 400 MG/1
TABLET, EXTENDED RELEASE ORAL
Refills: 1 | COMMUNITY
Start: 2019-06-20 | End: 2021-10-29

## 2019-06-25 RX ORDER — METOLAZONE 2.5 MG/1
TABLET ORAL
COMMUNITY
Start: 2019-06-07 | End: 2019-06-28

## 2019-06-25 RX ORDER — FLUTICASONE PROPIONATE AND SALMETEROL 500; 50 UG/1; UG/1
1 POWDER RESPIRATORY (INHALATION)
COMMUNITY
Start: 2017-08-02 | End: 2021-05-14

## 2019-06-25 RX ORDER — FLUTICASONE PROPIONATE 50 MCG
SPRAY, SUSPENSION (ML) NASAL
Refills: 3 | COMMUNITY
Start: 2019-06-11 | End: 2021-05-14

## 2019-06-25 RX ORDER — TIOTROPIUM BROMIDE INHALATION SPRAY 1.56 UG/1
SPRAY, METERED RESPIRATORY (INHALATION)
Refills: 3 | COMMUNITY
Start: 2019-06-17

## 2019-06-25 RX ORDER — PRAVASTATIN SODIUM 10 MG
TABLET ORAL
Refills: 8 | COMMUNITY
Start: 2019-06-10 | End: 2021-05-14

## 2019-06-25 NOTE — CONSULTS
New Patient Consultation    This is the first visit for this 75year old referred for evaluation of venous stasis ulcers of his lower extremities. History of Present Illness:    The patient is a 75year old referred by Dr. Adline Kussmaul wound FUROSEMIDE 40 MG Oral Tab TAKE 1 TABLET BY MOUTH TWICE DAILY Disp: 60 tablet Rfl: 0   metolazone 2.5 MG Oral Tab Take as directed Disp: 2 tablet Rfl: 0   SULFAMETHOXAZOLE-TRIMETHOPRIM 400-80 MG Oral Tab TAKE 1 TABLET BY MOUTH DAILY.  Disp: 30 tablet Rfl: history of difficulty or pain with swallowing, reflux symptoms, nausea, vomiting, dark/ bloody stools, diarrhea, constipation,  change in bowel habits, or abdominal pain.      Genitourinary:  The patient denies frequent urination, needing to get up at night granulation tissue are noted as has brawny edema of the surrounding tissues. The left leg is noted to have a similar wound over the medial malleolus measuring approximate 10 cm in length.   There is also a wound at the lateral leg which measured 5 to 4 cm

## 2019-06-28 ENCOUNTER — OFFICE VISIT (OUTPATIENT)
Dept: WOUND CARE | Facility: HOSPITAL | Age: 74
End: 2019-06-28
Attending: INTERNAL MEDICINE
Payer: MEDICARE

## 2019-06-28 DIAGNOSIS — S81.802D UNSPECIFIED OPEN WOUND, LEFT LOWER LEG, SUBSEQUENT ENCOUNTER: Primary | ICD-10-CM

## 2019-06-28 DIAGNOSIS — S81.801D WOUND OF RIGHT LOWER EXTREMITY, SUBSEQUENT ENCOUNTER: ICD-10-CM

## 2019-06-28 DIAGNOSIS — S91.105D OPEN WOUND OF SECOND TOE OF LEFT FOOT, SUBSEQUENT ENCOUNTER: ICD-10-CM

## 2019-06-28 PROCEDURE — 15272 SKIN SUB GRAFT T/A/L ADD-ON: CPT

## 2019-06-28 PROCEDURE — 97597 DBRDMT OPN WND 1ST 20 CM/<: CPT

## 2019-06-28 PROCEDURE — 15271 SKIN SUB GRAFT TRNK/ARM/LEG: CPT

## 2019-06-28 PROCEDURE — 97598 DBRDMT OPN WND ADDL 20CM/<: CPT

## 2019-06-28 PROCEDURE — 29581 APPL MULTLAYER CMPRN SYS LEG: CPT

## 2019-06-28 RX ORDER — METOLAZONE 2.5 MG/1
TABLET ORAL
Qty: 2 TABLET | Refills: 5 | Status: SHIPPED | OUTPATIENT
Start: 2019-06-28 | End: 2019-07-07

## 2019-06-28 NOTE — PROGRESS NOTES
Chief Complaint  This information was obtained from the patient  Patient is here for a wound care follow up. He denies any concerns or pain.  Dressing changes \"good well\"    Allergies  NKDA    HPI  This information was obtained from the patient    6-28- improved significantly. No s/o infection - leg swelling improved - Continues t be on lasix and bactrim prophylactically. 3-8-19: Wounds look awesome - shrunk significantly - legs not swollen anymore - no redness either. Theraskin in place. 3-1-19:  Wo / NEW PAIN. 12-7-18: Legs swollen - wounds stable - one wound closed. 11-30-18: WOunds stable and slightly better. 11-21-18: Wound culture + enterobacter - started CIPRO - will DC doxycycline.    He did not tolerate comprilan well - had pain and Yes        Objective    Constitutional  Height/Length: 73 in (185.42 cm), Weight: 267 lbs (121.36 kgs), BMI: 35.2, Temperature: 97.0 °F (36.11 °C), Pulse: 57 bpm, Respiratory Rate: 16 breaths/min, Blood Pressure: 135/80 mmHg.      Integumentary (Hair, Skin) Left, Medial Lower Leg is a Full Thickness Venous Ulcer and has received a status of Not Healed. Subsequent wound encounter measurements are 9.5cm length x 5.3cm width x 0.1cm depth, with an area of 50.35 sq cm and a volume of 5.035 cubic cm.  No tunneling Localized edema        Procedures    Wound #4  Wound #4 (Venous Ulcer) is located on the left, lateral lower leg.  A selective debridement with a total area debrided of 12.25 sq cm was performed by Amairani Real MD. to remove devitalized tissue: bio fibrin, and slough. The following instrument(s) were used: curette and forceps. Pain control was achieved using 4% Lido. A time out was not conducted prior to the start of the procedure. A moderate amount of bleeding was controlled with pressure.  The proce center visits during preparation for physician exam if wound bed contains fibrin or eschar. 4% Topical Lidocaine    Wound Cleansing & Dressings  May shower with protection.   Cleanse with saline or wound cleanser  Collagen  Hydrofera Transfer   ABD pad  Ch

## 2019-07-02 ENCOUNTER — OFFICE VISIT (OUTPATIENT)
Dept: WOUND CARE | Facility: HOSPITAL | Age: 74
End: 2019-07-02
Attending: INTERNAL MEDICINE
Payer: MEDICARE

## 2019-07-02 DIAGNOSIS — S91.105D OPEN WOUND OF SECOND TOE OF LEFT FOOT, SUBSEQUENT ENCOUNTER: ICD-10-CM

## 2019-07-02 DIAGNOSIS — S81.801D WOUND OF RIGHT LOWER EXTREMITY, SUBSEQUENT ENCOUNTER: ICD-10-CM

## 2019-07-02 DIAGNOSIS — S81.802D UNSPECIFIED OPEN WOUND, LEFT LOWER LEG, SUBSEQUENT ENCOUNTER: Primary | ICD-10-CM

## 2019-07-02 PROCEDURE — 29581 APPL MULTLAYER CMPRN SYS LEG: CPT

## 2019-07-05 ENCOUNTER — OFFICE VISIT (OUTPATIENT)
Dept: WOUND CARE | Facility: HOSPITAL | Age: 74
End: 2019-07-05
Attending: INTERNAL MEDICINE
Payer: MEDICARE

## 2019-07-05 DIAGNOSIS — S81.802D UNSPECIFIED OPEN WOUND, LEFT LOWER LEG, SUBSEQUENT ENCOUNTER: Primary | ICD-10-CM

## 2019-07-05 DIAGNOSIS — S81.801D WOUND OF RIGHT LOWER EXTREMITY, SUBSEQUENT ENCOUNTER: ICD-10-CM

## 2019-07-05 PROCEDURE — 29581 APPL MULTLAYER CMPRN SYS LEG: CPT

## 2019-07-09 ENCOUNTER — OFFICE VISIT (OUTPATIENT)
Dept: WOUND CARE | Facility: HOSPITAL | Age: 74
End: 2019-07-09
Attending: INTERNAL MEDICINE
Payer: MEDICARE

## 2019-07-09 DIAGNOSIS — S81.802D UNSPECIFIED OPEN WOUND, LEFT LOWER LEG, SUBSEQUENT ENCOUNTER: Primary | ICD-10-CM

## 2019-07-09 DIAGNOSIS — S81.801D WOUND OF RIGHT LOWER EXTREMITY, SUBSEQUENT ENCOUNTER: ICD-10-CM

## 2019-07-09 PROCEDURE — 29581 APPL MULTLAYER CMPRN SYS LEG: CPT

## 2019-07-12 ENCOUNTER — OFFICE VISIT (OUTPATIENT)
Dept: WOUND CARE | Facility: HOSPITAL | Age: 74
End: 2019-07-12
Attending: INTERNAL MEDICINE
Payer: MEDICARE

## 2019-07-12 DIAGNOSIS — S81.802D UNSPECIFIED OPEN WOUND, LEFT LOWER LEG, SUBSEQUENT ENCOUNTER: Primary | ICD-10-CM

## 2019-07-12 DIAGNOSIS — S81.801D WOUND OF RIGHT LOWER EXTREMITY, SUBSEQUENT ENCOUNTER: ICD-10-CM

## 2019-07-12 PROCEDURE — 11042 DBRDMT SUBQ TIS 1ST 20SQCM/<: CPT

## 2019-07-12 PROCEDURE — 15272 SKIN SUB GRAFT T/A/L ADD-ON: CPT

## 2019-07-12 PROCEDURE — 29581 APPL MULTLAYER CMPRN SYS LEG: CPT

## 2019-07-12 PROCEDURE — 15271 SKIN SUB GRAFT TRNK/ARM/LEG: CPT

## 2019-07-12 PROCEDURE — 11045 DBRDMT SUBQ TISS EACH ADDL: CPT

## 2019-07-12 NOTE — PROGRESS NOTES
Chief Complaint  This information was obtained from the patient  Patient is here for a follow up visit for wounds to BLE. Allergies  NKDA    HPI  This information was obtained from the patient    7-12-19;  Wound on left leg where graft was placed - dec infection - edema down   3-22-19: Wound dimensions improving - Plan on grafting RLE  wound today as it is week 3. No s/o infection. 3-15-19: Dimensions improved significantly.  No s/o infection - leg swelling improved - Continues t be on lasix and bactrim in wound dimensions. 12-14-18: WOUNDS STABLE - BUT SIGNIFICANT PERIWOUND MACERATION AND SKIN BREAKDOWNS BOTH LEGS - UNCLEAR WHY   NO PURULENT DISCHARGE / NEW PAIN. 12-7-18: Legs swollen - wounds stable - one wound closed.      11-30-18: WOunds stabl ()  Neurological    General Notes:  negative except HPI - denies fever    Additional Information  Medication reconciliation completed at today's visit. : Yes        Objective    Constitutional  Height/Length: 73 in (185.42 cm), Weight: 267 lbs (121.36 kg not exhibit signs or symptoms of infection. Local Pulse is Normal.   General Notes:  Measured on angle    Wound #9 Left, Medial Lower Leg is a Full Thickness Venous Ulcer and has received a status of Not Healed.  Subsequent wound encounter measurements are and inflammation of bilateral lower extremity  (Encounter Diagnosis) B95.61 - Methicillin susceptible Staphylococcus aureus infection as the cause of diseases classified elsewhere  (Encounter Diagnosis) R60.0 - Localized edema        Procedures    Wound #4 3cm width x 0.2cm depth; with an area of 30.6 sq cm and a volume of 6.12 cubic cm; Wound #7 (Venous Ulcer) is located on the right, medial lower leg. A Multi-Layer Compression Wrap procedure was performed.  A 2 Layers Coflex was applied with high 30-40 m contains fibrin or eschar. 4% Topical Lidocaine    Wound Cleansing & Dressings  May shower with protection.   Cleanse with saline or wound cleanser  Collagen  Hydrofera Transfer   ABD pad  Change dressing every: - 4 weeks    Wound #7 Right, Medial Lower Le

## 2019-07-16 ENCOUNTER — OFFICE VISIT (OUTPATIENT)
Dept: WOUND CARE | Facility: HOSPITAL | Age: 74
End: 2019-07-16
Attending: INTERNAL MEDICINE
Payer: MEDICARE

## 2019-07-16 DIAGNOSIS — S81.802D UNSPECIFIED OPEN WOUND, LEFT LOWER LEG, SUBSEQUENT ENCOUNTER: Primary | ICD-10-CM

## 2019-07-16 DIAGNOSIS — S81.801D WOUND OF RIGHT LOWER EXTREMITY, SUBSEQUENT ENCOUNTER: ICD-10-CM

## 2019-07-16 PROCEDURE — 29581 APPL MULTLAYER CMPRN SYS LEG: CPT

## 2019-07-19 ENCOUNTER — OFFICE VISIT (OUTPATIENT)
Dept: WOUND CARE | Facility: HOSPITAL | Age: 74
End: 2019-07-19
Attending: INTERNAL MEDICINE
Payer: MEDICARE

## 2019-07-19 DIAGNOSIS — S81.801D WOUND OF RIGHT LOWER EXTREMITY, SUBSEQUENT ENCOUNTER: ICD-10-CM

## 2019-07-19 DIAGNOSIS — S81.802D UNSPECIFIED OPEN WOUND, LEFT LOWER LEG, SUBSEQUENT ENCOUNTER: Primary | ICD-10-CM

## 2019-07-19 PROCEDURE — 15271 SKIN SUB GRAFT TRNK/ARM/LEG: CPT

## 2019-07-19 PROCEDURE — 15272 SKIN SUB GRAFT T/A/L ADD-ON: CPT

## 2019-07-19 PROCEDURE — 29581 APPL MULTLAYER CMPRN SYS LEG: CPT

## 2019-07-19 RX ORDER — METOLAZONE 2.5 MG/1
TABLET ORAL
Qty: 2 TABLET | Refills: 5 | Status: SHIPPED | OUTPATIENT
Start: 2019-07-19 | End: 2021-03-08 | Stop reason: ALTCHOICE

## 2019-07-19 NOTE — PROGRESS NOTES
Chief Complaint  This information was obtained from the patient  Patient is here for a nurse visit for wounds to BLE. He denies pain on the wound.      Allergies  NKDA    HPI  This information was obtained from the patient    7-19-19: Wounds all look stab Some parts of left leg wounds still with significant fibrotic base - cross hatched today with blade. no s/o infection  4-12-19: Wounds improved.  theraskin applied on wound on RLE  4-1-19: Wound dimensions improving - no s/o infection - edema down   3-2 1.2 which is OK.    1-11-19: Wounds much improved - culture positive for staph aureus. 1-4-19: Wounds deteriorated. need labs - will culture today    12-21-18: Wounds overall stable. Edema improved - But, not much improvement in wound dimensions.      12- patient    Complaints and Symptoms  Patient denies complaints or symptoms related to:   Constitutional Symptoms (General Health)  Eyes  Ear/Nose/Mouth/Throat  Respiratory  Cardiovascular (Central/Peripheral)  Gastrointestinal (GI)  Genitourinary ()  Neur well defined. Wound bed has 51-75% slough, 1-25% firm granulation. The periwound skin exhibited: Edema, Dry/Scaly, Hemosiderosis. The periwound skin did not exhibit: Moist, Maceration. The temperature of the periwound skin is Warm.  Periwound skin does no ulcer and inflammation of bilateral lower extremity  (Encounter Diagnosis) B95.61 - Methicillin susceptible Staphylococcus aureus infection as the cause of diseases classified elsewhere  (Encounter Diagnosis) R60.0 - Localized edema        Procedures    Wo skin/subcutaneous tissue level excisional/surgical debridement with a total area debrided of 42.9 sq cm was performed by Marcia Rojo MD. Subcutaneous was removed along with devitalized tissue: biofilm, fibrin, and slough.  The following instrument sorbact  ABD pad  Change dressing every: - 4 days    Wound #7 Right, Medial Lower Leg     Topicals:  Initial Anesthetic Order: Apply lidocaine to wound bed on all future wound center visits during preparation for physician exam if wound bed contains fibrin

## 2019-07-23 ENCOUNTER — OFFICE VISIT (OUTPATIENT)
Dept: WOUND CARE | Facility: HOSPITAL | Age: 74
End: 2019-07-23
Attending: INTERNAL MEDICINE
Payer: MEDICARE

## 2019-07-23 DIAGNOSIS — S81.802D UNSPECIFIED OPEN WOUND, LEFT LOWER LEG, SUBSEQUENT ENCOUNTER: Primary | ICD-10-CM

## 2019-07-23 DIAGNOSIS — S81.801D WOUND OF RIGHT LOWER EXTREMITY, SUBSEQUENT ENCOUNTER: ICD-10-CM

## 2019-07-23 DIAGNOSIS — S91.105D OPEN WOUND OF SECOND TOE OF LEFT FOOT, SUBSEQUENT ENCOUNTER: ICD-10-CM

## 2019-07-23 PROCEDURE — 29581 APPL MULTLAYER CMPRN SYS LEG: CPT

## 2019-07-26 ENCOUNTER — OFFICE VISIT (OUTPATIENT)
Dept: WOUND CARE | Facility: HOSPITAL | Age: 74
End: 2019-07-26
Attending: INTERNAL MEDICINE
Payer: MEDICARE

## 2019-07-26 DIAGNOSIS — S81.801D WOUND OF RIGHT LOWER EXTREMITY, SUBSEQUENT ENCOUNTER: ICD-10-CM

## 2019-07-26 DIAGNOSIS — S81.802D UNSPECIFIED OPEN WOUND, LEFT LOWER LEG, SUBSEQUENT ENCOUNTER: Primary | ICD-10-CM

## 2019-07-26 PROCEDURE — 15271 SKIN SUB GRAFT TRNK/ARM/LEG: CPT

## 2019-07-26 PROCEDURE — 15272 SKIN SUB GRAFT T/A/L ADD-ON: CPT

## 2019-07-30 ENCOUNTER — OFFICE VISIT (OUTPATIENT)
Dept: WOUND CARE | Facility: HOSPITAL | Age: 74
End: 2019-07-30
Attending: INTERNAL MEDICINE
Payer: MEDICARE

## 2019-07-30 DIAGNOSIS — S81.801D WOUND OF RIGHT LOWER EXTREMITY, SUBSEQUENT ENCOUNTER: ICD-10-CM

## 2019-07-30 DIAGNOSIS — S81.802D UNSPECIFIED OPEN WOUND, LEFT LOWER LEG, SUBSEQUENT ENCOUNTER: Primary | ICD-10-CM

## 2019-07-30 PROCEDURE — 29581 APPL MULTLAYER CMPRN SYS LEG: CPT

## 2019-08-02 ENCOUNTER — OFFICE VISIT (OUTPATIENT)
Dept: WOUND CARE | Facility: HOSPITAL | Age: 74
End: 2019-08-02
Attending: INTERNAL MEDICINE
Payer: MEDICARE

## 2019-08-02 DIAGNOSIS — R60.0 LOCALIZED EDEMA: Primary | ICD-10-CM

## 2019-08-02 DIAGNOSIS — S81.801D WOUND OF RIGHT LOWER EXTREMITY, SUBSEQUENT ENCOUNTER: ICD-10-CM

## 2019-08-02 DIAGNOSIS — S81.802D UNSPECIFIED OPEN WOUND, LEFT LOWER LEG, SUBSEQUENT ENCOUNTER: ICD-10-CM

## 2019-08-02 PROCEDURE — 29581 APPL MULTLAYER CMPRN SYS LEG: CPT

## 2019-08-02 NOTE — PROGRESS NOTES
Chief Complaint  This information was obtained from the patient  Patient is here for a follow up for wounds to BLE.  Patients denies pain on the wound     Allergies  NKDA    HPI  This information was obtained from the patient    8-2-19: Wound stable - the improving. Right leg skin sub slid off - did not incorporate but wound improving. Some parts of left leg wounds still with significant fibrotic base - cross hatched today with blade. no s/o infection  4-12-19: Wounds improved.  theraskin applied on woun through [facility]. Batrim was refilled for 30 days Jan 11th. [facility] reviewed - Cr - 1.2 which is OK.    1-11-19: Wounds much improved - culture positive for staph aureus. 1-4-19: Wounds deteriorated. need labs - will culture today    12-21-18:  Wounds overall diet.    Review of Systems (ROS)  This information was obtained from the patient    Complaints and Symptoms  Patient denies complaints or symptoms related to:   Constitutional Symptoms (General Health)  Eyes  Ear/Nose/Mouth/Throat  Respiratory  Cardiovascu Maceration. The temperature of the periwound skin is Warm. Periwound skin does not exhibit signs or symptoms of infection.  Local Pulse is Normal.   General Notes:  TheraSkin remains intact    Wound #7 Right, Medial Lower Leg is an acute Full Thickness Veno present  Compression Device In Use: Yes  Device Used Correctly: Yes  Compression Device Used: Multi-Layer Wrap  Calf Measurement 34 cm from heel with right measurement of 36 cm  Ankle Measurement 12 cm from heel with right measurement of 24.3 cm          A Topicals:  Initial Anesthetic Order: Apply lidocaine to wound bed on all future wound center visits during preparation for physician exam if wound bed contains fibrin or eschar.   4% Topical Lidocaine    Wound Cleansing & Dressings  May shower with prot

## 2019-08-06 ENCOUNTER — HOSPITAL ENCOUNTER (OUTPATIENT)
Dept: LAB | Facility: HOSPITAL | Age: 74
Discharge: HOME | End: 2019-08-06
Attending: INTERNAL MEDICINE
Payer: MEDICARE

## 2019-08-06 ENCOUNTER — OFFICE VISIT (OUTPATIENT)
Dept: WOUND CARE | Facility: HOSPITAL | Age: 74
End: 2019-08-06
Attending: INTERNAL MEDICINE
Payer: MEDICARE

## 2019-08-06 DIAGNOSIS — S81.801D WOUND OF RIGHT LOWER EXTREMITY, SUBSEQUENT ENCOUNTER: ICD-10-CM

## 2019-08-06 DIAGNOSIS — R60.0 LOCALIZED EDEMA: ICD-10-CM

## 2019-08-06 DIAGNOSIS — S81.802D UNSPECIFIED OPEN WOUND, LEFT LOWER LEG, SUBSEQUENT ENCOUNTER: Primary | ICD-10-CM

## 2019-08-06 DIAGNOSIS — S91.105D OPEN WOUND OF SECOND TOE OF LEFT FOOT, SUBSEQUENT ENCOUNTER: ICD-10-CM

## 2019-08-06 LAB
ANION GAP SERPL CALC-SCNC: 7 MMOL/L (ref 0–18)
BUN BLD-MCNC: 56 MG/DL (ref 7–18)
BUN/CREAT SERPL: 36.6 (ref 10–20)
CALCIUM BLD-MCNC: 10.7 MG/DL (ref 8.5–10.1)
CHLORIDE SERPL-SCNC: 97 MMOL/L (ref 98–112)
CO2 SERPL-SCNC: 29 MMOL/L (ref 21–32)
CREAT BLD-MCNC: 1.53 MG/DL (ref 0.7–1.3)
GLUCOSE BLD-MCNC: 96 MG/DL (ref 70–99)
OSMOLALITY SERPL CALC.SUM OF ELEC: 291 MOSM/KG (ref 275–295)
POTASSIUM SERPL-SCNC: 3.4 MMOL/L (ref 3.5–5.1)
SODIUM SERPL-SCNC: 133 MMOL/L (ref 136–145)

## 2019-08-06 PROCEDURE — 29581 APPL MULTLAYER CMPRN SYS LEG: CPT

## 2019-08-06 PROCEDURE — 80048 BASIC METABOLIC PNL TOTAL CA: CPT

## 2019-08-06 PROCEDURE — 36415 COLL VENOUS BLD VENIPUNCTURE: CPT

## 2019-08-09 ENCOUNTER — OFFICE VISIT (OUTPATIENT)
Dept: WOUND CARE | Facility: HOSPITAL | Age: 74
End: 2019-08-09
Attending: INTERNAL MEDICINE
Payer: MEDICARE

## 2019-08-09 DIAGNOSIS — N18.2 CHRONIC KIDNEY DISEASE, STAGE II (MILD): Primary | ICD-10-CM

## 2019-08-09 DIAGNOSIS — S81.801D WOUND OF RIGHT LOWER EXTREMITY, SUBSEQUENT ENCOUNTER: ICD-10-CM

## 2019-08-09 DIAGNOSIS — S81.802D UNSPECIFIED OPEN WOUND, LEFT LOWER LEG, SUBSEQUENT ENCOUNTER: ICD-10-CM

## 2019-08-09 PROCEDURE — 15272 SKIN SUB GRAFT T/A/L ADD-ON: CPT

## 2019-08-09 PROCEDURE — 29581 APPL MULTLAYER CMPRN SYS LEG: CPT

## 2019-08-09 PROCEDURE — 15271 SKIN SUB GRAFT TRNK/ARM/LEG: CPT

## 2019-08-09 NOTE — PROGRESS NOTES
Chief Complaint  This information was obtained from the patient  Patient is here for a nurse visit dressing change.  Patients denies pain or no other issues    Allergies  NKDA    HPI  This information was obtained from the patient    8-9-19: Wounds stable dimensions are larger because 2 wounds on left leg medial - merged. no s/o infection. 4-26-19: Wound improving. No s/o infection. 4-19-19: Wounds improving. Right leg skin sub slid off - did not incorporate but wound improving.   Some parts of left leg w getting better overall - Cr slightly up - repeat BMP pending - he need refills on lasix and K which were sent to [facility] / [facility] through [employer]. Batrim was refilled for 30 days Jan 11th.   BMP reviewed - Cr - 1.2 which is OK.    1-11-19: Wounds much impro other  skin subs at his previous clinic.    he is extremely compliant  with low carb low salt high protein diet. General Notes:  Theraskin applied to right medial wound.  KSteene [id number]    Review of Systems (ROS)  This information was obtained from t Hemosiderosis. The periwound skin did not exhibit: Moist, Maceration. The temperature of the periwound skin is Warm. Periwound skin does not exhibit signs or symptoms of infection.  Local Pulse is Normal.  General Notes:  Integrating Theraskin to wound bed Diagnosis) R60.0 - Localized edema        Procedures    Wound #4  Wound #4 (Venous Ulcer) is located on the left, lateral lower leg. A Multi-Layer Compression Wrap procedure was performed. A Multi-Layer Coflex was applied with high 30-40 mmhg.  The procedur wound center visits during preparation for physician exam if wound bed contains fibrin or eschar. 4% Topical Lidocaine    Wound Cleansing & Dressings  May shower with protection.   Cleanse with saline or wound cleanser  Steroid cream/ointment - Betamethaso tue  Please schedule for 30 minute time slot. Misc/Additional Orders  Supplement with a daily multivitamin. Increase dietary protein    Care summary  Discussed the Plan of Care at bedside with patient.  The patient verbally acknowledges understanding of

## 2019-08-13 ENCOUNTER — OFFICE VISIT (OUTPATIENT)
Dept: WOUND CARE | Facility: HOSPITAL | Age: 74
End: 2019-08-13
Attending: INTERNAL MEDICINE
Payer: MEDICARE

## 2019-08-13 ENCOUNTER — HOSPITAL ENCOUNTER (OUTPATIENT)
Dept: LAB | Facility: HOSPITAL | Age: 74
Discharge: HOME | End: 2019-08-13
Attending: INTERNAL MEDICINE
Payer: MEDICARE

## 2019-08-13 DIAGNOSIS — N18.2 CHRONIC KIDNEY DISEASE, STAGE II (MILD): ICD-10-CM

## 2019-08-13 DIAGNOSIS — S81.801D WOUND OF RIGHT LOWER EXTREMITY, SUBSEQUENT ENCOUNTER: ICD-10-CM

## 2019-08-13 DIAGNOSIS — N18.2 CHRONIC KIDNEY DISEASE, STAGE II (MILD): Primary | ICD-10-CM

## 2019-08-13 DIAGNOSIS — S81.802D UNSPECIFIED OPEN WOUND, LEFT LOWER LEG, SUBSEQUENT ENCOUNTER: ICD-10-CM

## 2019-08-13 LAB
ANION GAP SERPL CALC-SCNC: 7 MMOL/L (ref 0–18)
BUN BLD-MCNC: 65 MG/DL (ref 7–18)
BUN/CREAT SERPL: 38.9 (ref 10–20)
CALCIUM BLD-MCNC: 10.4 MG/DL (ref 8.5–10.1)
CHLORIDE SERPL-SCNC: 97 MMOL/L (ref 98–112)
CO2 SERPL-SCNC: 32 MMOL/L (ref 21–32)
CREAT BLD-MCNC: 1.67 MG/DL (ref 0.7–1.3)
GLUCOSE BLD-MCNC: 94 MG/DL (ref 70–99)
OSMOLALITY SERPL CALC.SUM OF ELEC: 300 MOSM/KG (ref 275–295)
POTASSIUM SERPL-SCNC: 3.3 MMOL/L (ref 3.5–5.1)
SODIUM SERPL-SCNC: 136 MMOL/L (ref 136–145)

## 2019-08-13 PROCEDURE — 29581 APPL MULTLAYER CMPRN SYS LEG: CPT

## 2019-08-13 PROCEDURE — 80048 BASIC METABOLIC PNL TOTAL CA: CPT

## 2019-08-13 PROCEDURE — 36415 COLL VENOUS BLD VENIPUNCTURE: CPT

## 2019-08-16 ENCOUNTER — OFFICE VISIT (OUTPATIENT)
Dept: WOUND CARE | Facility: HOSPITAL | Age: 74
End: 2019-08-16
Attending: INTERNAL MEDICINE
Payer: MEDICARE

## 2019-08-16 DIAGNOSIS — S81.802D UNSPECIFIED OPEN WOUND, LEFT LOWER LEG, SUBSEQUENT ENCOUNTER: Primary | ICD-10-CM

## 2019-08-16 DIAGNOSIS — S81.801D WOUND OF RIGHT LOWER EXTREMITY, SUBSEQUENT ENCOUNTER: ICD-10-CM

## 2019-08-16 DIAGNOSIS — S91.105D OPEN WOUND OF SECOND TOE OF LEFT FOOT, SUBSEQUENT ENCOUNTER: ICD-10-CM

## 2019-08-16 PROCEDURE — 15272 SKIN SUB GRAFT T/A/L ADD-ON: CPT

## 2019-08-16 PROCEDURE — 15271 SKIN SUB GRAFT TRNK/ARM/LEG: CPT

## 2019-08-16 NOTE — PROGRESS NOTES
Chief Complaint  This information was obtained from the patient  Patient is here for a nurse visit dressing change. Patients denies pain or no other issues.     Allergies  NKDA    HPI  This information was obtained from the patient    8-16-19: Wounds impr Wound improving overall - epithelial islands moving in.   5-3-19: All wounds improving - Some wound dimensions are larger because 2 wounds on left leg medial - merged. no s/o infection. 4-26-19: Wound improving. No s/o infection.    4-19-19: Wounds impr all look improved overall. Will start skin substitutes on the most granular region. No s/o infection.    1-18-19: Wounds getting better overall - Cr slightly up - repeat BMP pending - he need refills on lasix and K which were [facility] centre over last several years. He has been in compression wraps for last 5 years. He has had apligraf placement and several other  skin subs at his previous clinic.     he is extremely compliant  with low carb low salt high protein diet.     Revi temperature of the periwound skin is Warm. Periwound skin does not exhibit signs or symptoms of infection.  Local Pulse is Normal.   General Notes:  Integrating theraskin to wound bed    Wound #9 Left, Medial Lower Leg is a Full Thickness Venous Ulcer and h (Venous Ulcer) is located on the left, lateral lower leg.  A skin/subcutaneous tissue level excisional/surgical debridement with a total area debrided of 6.4 sq cm was performed by Brian Figueroa MD. Subcutaneous was removed along with devitalized ti (Venous Ulcer) is located on the left, medial lower leg.  A skin/subcutaneous tissue level excisional/surgical debridement with a total area debrided of 44.62 sq cm was performed by Lucy Law MD. Subcutaneous was removed along with devitalized t Therapy:  Coflex 2 Layer  Avoid prolonged standing in one place. Elevate leg(s)as much as possible. Take you diuretics as directed.      Wound #7 Right, Medial Lower Leg     Topicals:  Initial Anesthetic Order: Apply lidocaine to wound bed on all future

## 2019-08-20 ENCOUNTER — HOSPITAL ENCOUNTER (OUTPATIENT)
Dept: LAB | Facility: HOSPITAL | Age: 74
Discharge: HOME | End: 2019-08-20
Attending: INTERNAL MEDICINE
Payer: MEDICARE

## 2019-08-20 ENCOUNTER — OFFICE VISIT (OUTPATIENT)
Dept: WOUND CARE | Facility: HOSPITAL | Age: 74
End: 2019-08-20
Attending: INTERNAL MEDICINE
Payer: MEDICARE

## 2019-08-20 DIAGNOSIS — N18.2 CHRONIC KIDNEY DISEASE, STAGE II (MILD): ICD-10-CM

## 2019-08-20 DIAGNOSIS — S81.802D UNSPECIFIED OPEN WOUND, LEFT LOWER LEG, SUBSEQUENT ENCOUNTER: Primary | ICD-10-CM

## 2019-08-20 DIAGNOSIS — S81.801D WOUND OF RIGHT LOWER EXTREMITY, SUBSEQUENT ENCOUNTER: ICD-10-CM

## 2019-08-20 LAB
ANION GAP SERPL CALC-SCNC: 8 MMOL/L (ref 0–18)
BUN BLD-MCNC: 49 MG/DL (ref 7–18)
BUN/CREAT SERPL: 33.8 (ref 10–20)
CALCIUM BLD-MCNC: 9.9 MG/DL (ref 8.5–10.1)
CHLORIDE SERPL-SCNC: 105 MMOL/L (ref 98–112)
CO2 SERPL-SCNC: 27 MMOL/L (ref 21–32)
CREAT BLD-MCNC: 1.45 MG/DL (ref 0.7–1.3)
GLUCOSE BLD-MCNC: 103 MG/DL (ref 70–99)
OSMOLALITY SERPL CALC.SUM OF ELEC: 303 MOSM/KG (ref 275–295)
POTASSIUM SERPL-SCNC: 4.2 MMOL/L (ref 3.5–5.1)
SODIUM SERPL-SCNC: 140 MMOL/L (ref 136–145)

## 2019-08-20 PROCEDURE — 36415 COLL VENOUS BLD VENIPUNCTURE: CPT

## 2019-08-20 PROCEDURE — 80048 BASIC METABOLIC PNL TOTAL CA: CPT

## 2019-08-20 PROCEDURE — 29581 APPL MULTLAYER CMPRN SYS LEG: CPT

## 2019-08-23 ENCOUNTER — OFFICE VISIT (OUTPATIENT)
Dept: WOUND CARE | Facility: HOSPITAL | Age: 74
End: 2019-08-23
Attending: INTERNAL MEDICINE
Payer: MEDICARE

## 2019-08-23 DIAGNOSIS — S91.105D OPEN WOUND OF SECOND TOE OF LEFT FOOT, SUBSEQUENT ENCOUNTER: ICD-10-CM

## 2019-08-23 DIAGNOSIS — S81.801D WOUND OF RIGHT LOWER EXTREMITY, SUBSEQUENT ENCOUNTER: ICD-10-CM

## 2019-08-23 DIAGNOSIS — S81.802D UNSPECIFIED OPEN WOUND, LEFT LOWER LEG, SUBSEQUENT ENCOUNTER: Primary | ICD-10-CM

## 2019-08-23 PROCEDURE — 29581 APPL MULTLAYER CMPRN SYS LEG: CPT

## 2019-08-23 NOTE — PROGRESS NOTES
Chief Complaint  This information was obtained from the patient  Patient is here for a wound care follow up. He denies any pain to the wounds.     Allergies  NKDA    HPI  This information was obtained from the patient    8-23-19: WOunds look stable - ther infection  considering SSG  5-8-19: Wound improving overall - epithelial islands moving in.   5-3-19: All wounds improving - Some wound dimensions are larger because 2 wounds on left leg medial - merged. no s/o infection. 4-26-19: Wound improving.  No s eliza today. 2-1-19: Wounds all look improved overall. Will start skin substitutes on the most granular region. No s/o infection.    1-18-19: Wounds getting better overall - Cr slightly up - repeat BMP pending - he need refills on lasix and K which of care treatment at [facility] wound centre over last several years. He has been in compression wraps for last 5 years.    He has had apligraf placement and several other  skin subs at his previous clinic.     he is extremely compliant  with low carb lo patient reports a wound pain of level 0/10. The wound margin is well defined. The periwound skin exhibited: Edema, Dry/Scaly, Hemosiderosis. The periwound skin did not exhibit: Moist, Maceration. The temperature of the periwound skin is Warm.  Periwound s (idiopathic) with ulcer and inflammation of bilateral lower extremity  (Encounter Diagnosis) B95.61 - Methicillin susceptible Staphylococcus aureus infection as the cause of diseases classified elsewhere  (Encounter Diagnosis) R60.0 - Localized edema days    Compression Therapy:  Coflex 2 Layer  Avoid prolonged standing in one place. Elevate leg(s)as much as possible. Take you diuretics as directed.      Wound #9 Left, Medial Lower Leg     Topicals:  Initial Anesthetic Order: Apply lidocaine to wound

## 2019-08-27 ENCOUNTER — APPOINTMENT (OUTPATIENT)
Dept: WOUND CARE | Facility: HOSPITAL | Age: 74
End: 2019-08-27
Attending: INTERNAL MEDICINE
Payer: MEDICARE

## 2019-08-28 ENCOUNTER — HOSPITAL ENCOUNTER (OUTPATIENT)
Dept: LAB | Facility: HOSPITAL | Age: 74
Discharge: HOME | End: 2019-08-28
Attending: INTERNAL MEDICINE
Payer: MEDICARE

## 2019-08-28 ENCOUNTER — OFFICE VISIT (OUTPATIENT)
Dept: WOUND CARE | Facility: HOSPITAL | Age: 74
End: 2019-08-28
Attending: INTERNAL MEDICINE
Payer: MEDICARE

## 2019-08-28 DIAGNOSIS — S81.801D WOUND OF RIGHT LOWER EXTREMITY, SUBSEQUENT ENCOUNTER: ICD-10-CM

## 2019-08-28 DIAGNOSIS — R60.0 LOCALIZED EDEMA: ICD-10-CM

## 2019-08-28 DIAGNOSIS — S81.802D UNSPECIFIED OPEN WOUND, LEFT LOWER LEG, SUBSEQUENT ENCOUNTER: ICD-10-CM

## 2019-08-28 DIAGNOSIS — R60.0 LOCALIZED EDEMA: Primary | ICD-10-CM

## 2019-08-28 LAB
ANION GAP SERPL CALC-SCNC: 5 MMOL/L (ref 0–18)
BUN BLD-MCNC: 40 MG/DL (ref 7–18)
BUN/CREAT SERPL: 34.8 (ref 10–20)
CALCIUM BLD-MCNC: 10.2 MG/DL (ref 8.5–10.1)
CHLORIDE SERPL-SCNC: 107 MMOL/L (ref 98–112)
CO2 SERPL-SCNC: 27 MMOL/L (ref 21–32)
CREAT BLD-MCNC: 1.15 MG/DL (ref 0.7–1.3)
GLUCOSE BLD-MCNC: 100 MG/DL (ref 70–99)
OSMOLALITY SERPL CALC.SUM OF ELEC: 298 MOSM/KG (ref 275–295)
POTASSIUM SERPL-SCNC: 4.5 MMOL/L (ref 3.5–5.1)
SODIUM SERPL-SCNC: 139 MMOL/L (ref 136–145)

## 2019-08-28 PROCEDURE — 29581 APPL MULTLAYER CMPRN SYS LEG: CPT

## 2019-08-28 PROCEDURE — 80048 BASIC METABOLIC PNL TOTAL CA: CPT

## 2019-08-28 PROCEDURE — 36415 COLL VENOUS BLD VENIPUNCTURE: CPT

## 2019-08-28 NOTE — PROGRESS NOTES
Chief Complaint  This information was obtained from the patient  Patient is here for a wound care follow up. He denies any pain to the wounds.     Allergies  NKDA    HPI  This information was obtained from the patient    8-26-19: Wounds strable - improvin increase diuretics  Will culture wound. Already patient is on oral lasix / k and daily preventive dose of bactrim    5-22-19: Wound improved overall.   Except for left medial area which has not improved much.   5-17-19: Wounds improving overall. no s/o in dimensions though. 2-9-19: Wounds are overall improving. No s/o infection. pedal edema down  Wound areas from theraskin application last week - were rinsed and cleaned and covered with new outer dressing.    Several other wounds had first application of knee).  based on the history that he is giving me, he as been diagnosed with venous reflux disease of lower extremtiies and has had several treatments for the same - nothing more to do per his vein specialist.   he has had pretty much all available standar an acute Full Thickness Venous Ulcer and has received a status of Not Healed. No tunneling has been noted. No sinus tract has been noted. No undermining has been noted. There is a moderate amount of serous drainage noted which has no odor.  The patient repo Problems    ICD-10  (Encounter Diagnosis) S81.801D - Unspecified open wound, right lower leg, subsequent encounter  (Encounter Diagnosis) S81.802D - Unspecified open wound, left lower leg, subsequent encounter  (Encounter Diagnosis) I87.333 - Chronic venou located on the right, medial lower leg. A Multi-Layer Compression Wrap procedure was performed. A Multi-Layer Coflex was applied with . The procedure was tolerated well. Wound #9  Wound #9 (Venous Ulcer) is located on the left, medial lower leg.  A parag Wound #9 Left, Medial Lower Leg     Topicals:  Initial Anesthetic Order: Apply lidocaine to wound bed on all future wound center visits during preparation for physician exam if wound bed contains fibrin or eschar.   4% Topical Lidocaine    Wound Cleansi

## 2019-09-03 ENCOUNTER — OFFICE VISIT (OUTPATIENT)
Dept: WOUND CARE | Facility: HOSPITAL | Age: 74
End: 2019-09-03
Attending: INTERNAL MEDICINE
Payer: MEDICARE

## 2019-09-03 DIAGNOSIS — S81.801D WOUND OF RIGHT LOWER EXTREMITY, SUBSEQUENT ENCOUNTER: ICD-10-CM

## 2019-09-03 DIAGNOSIS — S81.802D UNSPECIFIED OPEN WOUND, LEFT LOWER LEG, SUBSEQUENT ENCOUNTER: Primary | ICD-10-CM

## 2019-09-03 DIAGNOSIS — R60.0 LOCALIZED EDEMA: ICD-10-CM

## 2019-09-03 PROCEDURE — 29581 APPL MULTLAYER CMPRN SYS LEG: CPT

## 2019-09-06 ENCOUNTER — OFFICE VISIT (OUTPATIENT)
Dept: WOUND CARE | Facility: HOSPITAL | Age: 74
End: 2019-09-06
Attending: INTERNAL MEDICINE
Payer: MEDICARE

## 2019-09-06 DIAGNOSIS — S81.802D UNSPECIFIED OPEN WOUND, LEFT LOWER LEG, SUBSEQUENT ENCOUNTER: Primary | ICD-10-CM

## 2019-09-06 DIAGNOSIS — R60.0 LOCALIZED EDEMA: ICD-10-CM

## 2019-09-06 DIAGNOSIS — S81.801D WOUND OF RIGHT LOWER EXTREMITY, SUBSEQUENT ENCOUNTER: ICD-10-CM

## 2019-09-06 PROCEDURE — 11042 DBRDMT SUBQ TIS 1ST 20SQCM/<: CPT

## 2019-09-06 PROCEDURE — 11045 DBRDMT SUBQ TISS EACH ADDL: CPT

## 2019-09-06 NOTE — PROGRESS NOTES
9-6-19: Patient is here for a routine follow-up. His wounds are stable. There are epithelial edges moving in from all directions and all wounds. No signs of infection. There is increasing granulation in the wound base.   There continues to be scarred fi clean.   6-7-19: Wounds stable - wound culture showed some staph aureus - using topical gentamycin - wounds appears  - debrided to bleeding tissue today - all wounds - continue gentamycin for now.    consult plastics 6/25 appt.   5-31-19: Wounds on l doing good. no s/o infection - he will need refill on lasix and K.    2-22-19: Major wounds covered with theraskin stays intact. right medial and left medial leg wounds still with slough  -debrided - dressed with honey.    last labs --> cr=1.3- on lasix much bigger - seems like he is not tolerating Coflex 2. Need to r/o infeciton - he is on 7 day course of doxycycline.      11-7-18:  WOUND CLINIC CONSULTATION INITIAL VISIT    76 YO CM  here for evaluation and management of bilateral lower extremity ulcer sinus tract has been noted. No undermining has been noted. There is a moderate amount of sero-sanguineous drainage noted which has no odor. The patient reports a wound pain of level 0/10. The wound margin is well defined.  Wound bed has 1-25% slough,  spongy granulation. The periwound skin exhibited: Edema, Induration, Dry/Scaly, Hemosiderosis. The periwound skin did not exhibit: Moist, Maceration, Erythema. The temperature of the periwound skin is Warm.  Periwound skin does not exhibit signs or sympto bleeding was controlled with pressure. The procedure was tolerated well with a pain level of 0 throughout and a pain level of 0 following the procedure.  Post Debridement Measurements: 2.4cm length x 3.4cm width x 0.2cm depth; with an area of 8.16 sq cm and the procedure. A moderate amount of bleeding was controlled with pressure. The procedure was tolerated well with a pain level of 0 throughout and a pain level of 0 following the procedure.  Post Debridement Measurements: 10cm length x 5.7cm width x 0.2cm de Dressings  May shower with protection.  - Cleanse WITH SALINE THEN SOAK with PRONTOSAN WOUND IRRIGATION SOLUTION- DONOT RINSE IT OFF  Collagen - ENDOFORM  Hydrofera Transfer   Kerramax/Super absorbent  ABD pad - prn  Kerlix  Change dressing every: - 4 days

## 2019-09-10 ENCOUNTER — OFFICE VISIT (OUTPATIENT)
Dept: WOUND CARE | Facility: HOSPITAL | Age: 74
End: 2019-09-10
Attending: INTERNAL MEDICINE
Payer: MEDICARE

## 2019-09-10 DIAGNOSIS — S81.801D WOUND OF RIGHT LOWER EXTREMITY, SUBSEQUENT ENCOUNTER: ICD-10-CM

## 2019-09-10 DIAGNOSIS — S81.802D UNSPECIFIED OPEN WOUND, LEFT LOWER LEG, SUBSEQUENT ENCOUNTER: Primary | ICD-10-CM

## 2019-09-10 PROCEDURE — 29581 APPL MULTLAYER CMPRN SYS LEG: CPT

## 2019-09-13 ENCOUNTER — OFFICE VISIT (OUTPATIENT)
Dept: WOUND CARE | Facility: HOSPITAL | Age: 74
End: 2019-09-13
Attending: INTERNAL MEDICINE
Payer: MEDICARE

## 2019-09-13 DIAGNOSIS — S81.801D WOUND OF RIGHT LOWER EXTREMITY, SUBSEQUENT ENCOUNTER: ICD-10-CM

## 2019-09-13 DIAGNOSIS — S81.802D UNSPECIFIED OPEN WOUND, LEFT LOWER LEG, SUBSEQUENT ENCOUNTER: Primary | ICD-10-CM

## 2019-09-13 PROCEDURE — 11045 DBRDMT SUBQ TISS EACH ADDL: CPT

## 2019-09-13 PROCEDURE — 11042 DBRDMT SUBQ TIS 1ST 20SQCM/<: CPT

## 2019-09-13 NOTE — PROGRESS NOTES
Chief Complaint  This information was obtained from the patient  Patient is here for a wound care follow up. He denies any pain or new wound concerns.     Allergies  NKDA    HPI  This information was obtained from the patient    9-119: No acute changes - on left leg has regressed - overall. Unclear why - differentials include worsening edema Vs increasing biofilm / bioburden / vs critical colonization  Will increase diuretics  Will culture wound.   Already patient is on oral lasix / k and daily preventive d infection - on low dose prophylactic bactrim  2-15-19: Wounds all stable - seems improving- no s/o infection - no significant change in dimensions though. 2-9-19: Wounds are overall improving. No s/o infection.   pedal edema down  Wound areas from therask 2009 - on and off, but mostly persistent since 2013. He says they started after joint replacement surgeries ( hip / knee).   based on the history that he is giving me, he as been diagnosed with venous reflux disease of lower extremtiies and has had several periwound skin exhibited: Edema, Induration, Dry/Scaly, Hemosiderosis. The periwound skin did not exhibit: Erythema. The temperature of the periwound skin is Warm. Periwound skin does not exhibit signs or symptoms of infection.  Local Pulse is Normal.    Wo Assessment  Edema Assessment:  Left Extremity: Edema is present  Compression Device In Use: Yes  Device Used Correctly: Yes  Compression Device Used: Multi-Layer Wrap  Calf Measurement 34 cm from heel with left measurement of 36.5 cm  Ankle Measurement 12 located on the left, lateral lower leg. A Multi-Layer Compression Wrap procedure was performed. A 2 Layers Coflex was applied with high 30-40 mmhg. The procedure was tolerated well.     Wound #7  Wound #7 (Venous Ulcer) is located on the right, medial lower cm;        Plan    Wound Orders:  Wound #4 Left, Lateral Lower Leg    Topicals:  Initial Anesthetic Order: Apply lidocaine to wound bed on all future wound center visits during preparation for physician exam if wound bed contains fibrin or eschar.   4% Topi place.  Elevate leg(s)as much as possible. Take you diuretics as directed. Additional Orders: Follow-Up Appointments  Return Appointment in 1 week. Nicolasa Mcfadden RN visit for assessment of wound(s). - tue  Please schedule for 30 minute time slot.     Misc/A

## 2019-09-17 ENCOUNTER — OFFICE VISIT (OUTPATIENT)
Dept: WOUND CARE | Facility: HOSPITAL | Age: 74
End: 2019-09-17
Attending: INTERNAL MEDICINE
Payer: MEDICARE

## 2019-09-17 DIAGNOSIS — R60.0 LOCALIZED EDEMA: ICD-10-CM

## 2019-09-17 DIAGNOSIS — S81.801D WOUND OF RIGHT LOWER EXTREMITY, SUBSEQUENT ENCOUNTER: ICD-10-CM

## 2019-09-17 DIAGNOSIS — S81.802D UNSPECIFIED OPEN WOUND, LEFT LOWER LEG, SUBSEQUENT ENCOUNTER: Primary | ICD-10-CM

## 2019-09-17 PROCEDURE — 29581 APPL MULTLAYER CMPRN SYS LEG: CPT

## 2019-09-20 ENCOUNTER — OFFICE VISIT (OUTPATIENT)
Dept: WOUND CARE | Facility: HOSPITAL | Age: 74
End: 2019-09-20
Attending: INTERNAL MEDICINE
Payer: MEDICARE

## 2019-09-20 DIAGNOSIS — S81.801D WOUND OF RIGHT LOWER EXTREMITY, SUBSEQUENT ENCOUNTER: ICD-10-CM

## 2019-09-20 DIAGNOSIS — S81.802D UNSPECIFIED OPEN WOUND, LEFT LOWER LEG, SUBSEQUENT ENCOUNTER: Primary | ICD-10-CM

## 2019-09-20 PROCEDURE — 97598 DBRDMT OPN WND ADDL 20CM/<: CPT

## 2019-09-20 PROCEDURE — 97597 DBRDMT OPN WND 1ST 20 CM/<: CPT

## 2019-09-20 NOTE — PROGRESS NOTES
Chief Complaint  This information was obtained from the patient  Patient is here for a wound care follow up. He denies any pain or new wound concerns.     Allergies  NKDA    HPI  This information was obtained from the patient    9-20-19: Wound improved sl  - debrided to bleeding tissue today - all wounds - continue gentamycin for now. consult plastics 6/25 appt.  5-31-19: Wounds on left leg has regressed - overall.  Unclear why - differentials include worsening edema Vs increasing biofilm / bioburden intact. right medial and left medial leg wounds still with slough  -debrided - dressed with honey.   last labs --> cr=1.3- on lasix + K  no s/o infection - on low dose prophylactic bactrim  2-15-19: Wounds all stable - seems improving- no s/o infection - n CLINIC CONSULTATION INITIAL VISIT    74 YO CM  here for evaluation and management of bilateral lower extremity ulcers. He hs had these wounds since 2009 - on and off, but mostly persistent since 2013.   He says they started after joint replacement surgeries drainage noted which has no odor. The patient reports a wound pain of level 0/10. The wound margin is well defined. Wound bed has 26-50% slough, 26-50% bright red, firm granulation. The periwound skin exhibited: Edema, Dry/Scaly, Hemosiderosis.  The periwo not exhibit: Dry/Scaly, Maceration, Erythema. The temperature of the periwound skin is Warm. Periwound skin does not exhibit signs or symptoms of infection.  Local Pulse is Normal.    Lower Extremity Assessment  Edema Assessment:  Left Extremity: Edema is p 1.5cm length x 3.1cm width x 0.1cm depth; with an area of 4.65 sq cm and a volume of 0.465 cubic cm; Wound #4 (Venous Ulcer) is located on the left, lateral lower leg. A Multi-Layer Compression Wrap procedure was performed.  A 2 Layers Coflex was applied with an area of 51.94 sq cm and a volume of 5.194 cubic cm;        Plan    Wound Orders:  Wound #4 Left, Lateral Lower Leg    Topicals:  Initial Anesthetic Order: Apply lidocaine to wound bed on all future wound center visits during preparation for physici Betamethasone to periwound  Collagen - ENDOFORM  Hydrofera Transfer  Kerramax/Super absorbent  ABD pad - prn  Kerlix  Change dressing every: - 4 days    Compression Therapy:  Coflex 2 Layer  Avoid prolonged standing in one place.   Elevate leg(s)as much as

## 2019-09-24 ENCOUNTER — OFFICE VISIT (OUTPATIENT)
Dept: WOUND CARE | Facility: HOSPITAL | Age: 74
End: 2019-09-24
Attending: INTERNAL MEDICINE
Payer: MEDICARE

## 2019-09-24 DIAGNOSIS — S81.802D UNSPECIFIED OPEN WOUND, LEFT LOWER LEG, SUBSEQUENT ENCOUNTER: Primary | ICD-10-CM

## 2019-09-24 DIAGNOSIS — S81.801D WOUND OF RIGHT LOWER EXTREMITY, SUBSEQUENT ENCOUNTER: ICD-10-CM

## 2019-09-24 PROCEDURE — 29581 APPL MULTLAYER CMPRN SYS LEG: CPT

## 2019-09-27 ENCOUNTER — OFFICE VISIT (OUTPATIENT)
Dept: WOUND CARE | Facility: HOSPITAL | Age: 74
End: 2019-09-27
Attending: INTERNAL MEDICINE
Payer: MEDICARE

## 2019-09-27 DIAGNOSIS — S81.801D WOUND OF RIGHT LOWER EXTREMITY, SUBSEQUENT ENCOUNTER: ICD-10-CM

## 2019-09-27 DIAGNOSIS — S81.802D UNSPECIFIED OPEN WOUND, LEFT LOWER LEG, SUBSEQUENT ENCOUNTER: Primary | ICD-10-CM

## 2019-09-27 DIAGNOSIS — R60.0 LOCALIZED EDEMA: ICD-10-CM

## 2019-09-27 PROCEDURE — 87077 CULTURE AEROBIC IDENTIFY: CPT

## 2019-09-27 PROCEDURE — 87070 CULTURE OTHR SPECIMN AEROBIC: CPT

## 2019-09-27 PROCEDURE — 87205 SMEAR GRAM STAIN: CPT

## 2019-09-27 PROCEDURE — 11045 DBRDMT SUBQ TISS EACH ADDL: CPT

## 2019-09-27 PROCEDURE — 11042 DBRDMT SUBQ TIS 1ST 20SQCM/<: CPT

## 2019-09-27 PROCEDURE — 87186 SC STD MICRODIL/AGAR DIL: CPT

## 2019-09-27 NOTE — PROGRESS NOTES
Chief Complaint  This information was obtained from the patient  Patient is here for a follow up visit for BLE wounds. No concerns or pain.     Allergies  NKDA    HPI  This information was obtained from the patient    9-27-19: Wounds not much improved - t plastics on Tuesday for possible SSG.  6-4-19: Wound stable - using gentamycin drops daily.  All wounds appearing clean.  6-7-19: Wounds stable - wound culture showed some staph aureus - using topical gentamycin - wounds appears  - debrided to bleedi theraskin on the major wound on the right leg and on the small medial left ankle wound. overall he is doing good. no s/o infection - he will need refill on lasix and K.    2-22-19: Major wounds covered with theraskin stays intact.   right medial and left thick slough / necrotic tissue on the larger wounds of left leg - leg circumference much bigger - seems like he is not tolerating Coflex 2. Need to r/o infeciton - he is on 7 day course of doxycycline.     11-7-18:  [id number]  [facility] [place] INITIAL VISIT 3.1cm width x 0.1cm depth, with an area of 4.03 sq cm and a volume of 0.403 cubic cm. No tunneling has been noted. No sinus tract has been noted. No undermining has been noted. There is a large amount of serous drainage noted which has no odor.  The patient pain of level 0/10. The wound margin is well defined. Wound bed has 1-25% epithelialization, 51-75% slough, 1-25% bright red, pink, spongy granulation. The periwound skin exhibited: Edema, Dry/Scaly, Hemosiderosis.  The periwound skin did not exhibit: Rosemary prior to the start of the procedure. A minimal amount of bleeding was controlled with pressure. The procedure was tolerated well with a pain level of 0 throughout and a pain level of 0 following the procedure.  Post Debridement Measurements: 1.3cm length x minimal amount of bleeding was controlled with pressure. The procedure was tolerated well with a pain level of 0 throughout and a pain level of 0 following the procedure.  Post Debridement Measurements: 10.4cm length x 4.4cm width x 0.1cm depth; with an are Appointments  Return Appointment in 1 week. Ginny Ayala  RN visit for assessment of wound(s). - tue  Please schedule for 30 minute time slot. Misc/Additional Orders  Supplement with a daily multivitamin.   Increase dietary protein    Care summary  Discussed th

## 2019-10-04 ENCOUNTER — OFFICE VISIT (OUTPATIENT)
Dept: WOUND CARE | Facility: HOSPITAL | Age: 74
End: 2019-10-04
Attending: INTERNAL MEDICINE
Payer: MEDICARE

## 2019-10-04 DIAGNOSIS — S81.801D WOUND OF RIGHT LOWER EXTREMITY, SUBSEQUENT ENCOUNTER: ICD-10-CM

## 2019-10-04 DIAGNOSIS — R60.0 LOCALIZED EDEMA: ICD-10-CM

## 2019-10-04 DIAGNOSIS — S81.802D UNSPECIFIED OPEN WOUND, LEFT LOWER LEG, SUBSEQUENT ENCOUNTER: Primary | ICD-10-CM

## 2019-10-04 PROCEDURE — 29581 APPL MULTLAYER CMPRN SYS LEG: CPT

## 2019-10-04 NOTE — PROGRESS NOTES
Chief Complaint  This information was obtained from the patient  Patient is here for a wound care follow up. Allergies  NKDA    HPI  This information was obtained from the patient    10-4-19; Culture positive - started dicloxacillin.  periwound wet - H continue with skin substitutes  6-21-19: Wound appears clean and stable. Appt with plastics on Tuesday for possible SSG.  6-4-19: Wound stable - using gentamycin drops daily.  All wounds appearing clean.  6-7-19: Wounds stable - wound culture showed some st taking - well adherent - did not reapply on those wounds today. We started theraskin on the major wound on the right leg and on the small medial left ankle wound. overall he is doing good.   no s/o infection - he will need refill on lasix and K.    2-22-1 improved    11-16-18: Wounds have deteriorated significantly since last visit - Significant thick slough / necrotic tissue on the larger wounds of left leg - leg circumference much bigger - seems like he is not tolerating Coflex 2.   Need to r/o infeciton - Leg is a chronic Full Thickness Venous Ulcer and has received a status of Not Healed. Subsequent wound encounter measurements are 1.4cm length x 3cm width x 0.1cm depth, with an area of 4.2 sq cm and a volume of 0.42 cubic cm. No tunneling has been noted. sinus tract has been noted. No undermining has been noted. There is a large amount of serous drainage noted which has no odor. The patient reports a wound pain of level 0/10. The wound margin is well defined.  Wound bed has 1-25% epithelialization, 26-50% s medial lower leg. A Multi-Layer Compression Wrap procedure was performed. A Multi-Layer Coflex was applied with high 30-40 mmhg. The procedure was tolerated well.         Plan    Wound Orders:  Wound #4 Left, Lateral Lower Leg    Topicals:  Initial Anesthet understanding of all instructions and all questions were answered.  - complete antibiotics

## 2019-10-11 ENCOUNTER — OFFICE VISIT (OUTPATIENT)
Dept: WOUND CARE | Facility: HOSPITAL | Age: 74
End: 2019-10-11
Attending: INTERNAL MEDICINE
Payer: MEDICARE

## 2019-10-11 DIAGNOSIS — S81.802D UNSPECIFIED OPEN WOUND, LEFT LOWER LEG, SUBSEQUENT ENCOUNTER: Primary | ICD-10-CM

## 2019-10-11 DIAGNOSIS — S81.801D WOUND OF RIGHT LOWER EXTREMITY, SUBSEQUENT ENCOUNTER: ICD-10-CM

## 2019-10-11 PROCEDURE — 97598 DBRDMT OPN WND ADDL 20CM/<: CPT

## 2019-10-11 PROCEDURE — 97597 DBRDMT OPN WND 1ST 20 CM/<: CPT

## 2019-10-11 PROCEDURE — 11042 DBRDMT SUBQ TIS 1ST 20SQCM/<: CPT

## 2019-10-11 PROCEDURE — 11045 DBRDMT SUBQ TISS EACH ADDL: CPT

## 2019-10-11 NOTE — PROGRESS NOTES
Chief Complaint  This information was obtained from the patient  Patient is here for a wound care follow up. Pt states no new complaints or pain. Pt states HH has been coming Monday and Wednesdays.     Allergies  NKDA    HPI  This information was obtained in size  scar tissue present in the base of other wounds - debrided down to bleeding tissue. theraskin reapplied on left leg medial wound  6-28-19; Wounds stable and clean - Plastics consult appreciated.  Patients prefers to continue with skin substitutes prophylactically. 3-8-19: Wounds look awesome - shrunk significantly - legs not swollen anymore - no redness either. Theraskin in place.   3-1-19: Wounds improved - left leg wounds where we had applied theraskin looks v good  -looks like graft is taking - better. 11-21-18: Wound culture + enterobacter - started CIPRO - will DC doxycycline. He did not tolerate comprilan well - had pain and rash along ankle. Did not like iodosorb - it burned.   Leg measurements much better - wounds looks improved    11-16 Full Thickness Venous Ulcer and has received a status of Not Healed. Subsequent wound encounter measurements are 1cm length x 2.5cm width x 0.1cm depth, with an area of 2.5 sq cm and a volume of 0.25 cubic cm. No tunneling has been noted.  No sinus tract ha been noted. No sinus tract has been noted. No undermining has been noted. There is a large amount of serous drainage noted which has no odor. The patient reports a wound pain of level 0/10. The wound margin is well defined.  Wound bed has 1-25% epithelializ edema        Procedures    Wound #4  Wound #4 (Venous Ulcer) is located on the left, lateral lower leg.  A skin/subcutaneous tissue level excisional/surgical debridement with a total area debrided of 2.5 sq cm was performed by Brian Figueroa MD. David Lima skin/subcutaneous tissue level excisional/surgical debridement with a total area debrided of 45.1 sq cm was performed by Wei Prado MD. Subcutaneous was removed along with devitalized tissue: biofilm, fibrin, and slough.  Pain control was achieve possible. Take you diuretics as directed. Additional Orders: Follow-Up Appointments  Return Appointment in 1 week. - fri  Please schedule for 30 minute time slot. Misc/Additional Orders  Home health care nurse for wound care.  - monday and wednesd

## 2019-10-18 ENCOUNTER — OFFICE VISIT (OUTPATIENT)
Dept: WOUND CARE | Facility: HOSPITAL | Age: 74
End: 2019-10-18
Attending: INTERNAL MEDICINE
Payer: MEDICARE

## 2019-10-18 DIAGNOSIS — S81.801D WOUND OF RIGHT LOWER EXTREMITY, SUBSEQUENT ENCOUNTER: ICD-10-CM

## 2019-10-18 DIAGNOSIS — S81.802D UNSPECIFIED OPEN WOUND, LEFT LOWER LEG, SUBSEQUENT ENCOUNTER: Primary | ICD-10-CM

## 2019-10-18 PROCEDURE — 97597 DBRDMT OPN WND 1ST 20 CM/<: CPT

## 2019-10-18 PROCEDURE — 97598 DBRDMT OPN WND ADDL 20CM/<: CPT

## 2019-10-18 NOTE — PROGRESS NOTES
Chief Complaint  This information was obtained from the patient  Patient is here for a wound care follow up.     Allergies  NKDA    HPI  This information was obtained from the patient    10-18-19: Wounds unchanged - minimally better - overall same - discu stable - pedal edema is down - no s/o infection - no complaints. theraskin in place on medial left leg placed. theraskin applied on lateral left leg and right leg wound    7-12-19;  Wound on left leg where graft was placed - decreasing in size  scar tissu dimensions improving - Plan on grafting RLE  wound today as it is week 3. No s/o infection. 3-15-19: Dimensions improved significantly. No s/o infection - leg swelling improved - Continues t be on lasix and bactrim prophylactically.   3-8-19: Wounds look a SIGNIFICANT PERIWOUND MACERATION AND SKIN BREAKDOWNS BOTH LEGS - UNCLEAR WHY  NO PURULENT DISCHARGE / NEW PAIN. 12-7-18: Legs swollen - wounds stable - one wound closed. 11-30-18: WOunds stable and slightly better. 11-21-18:  Wound culture + entero chest pain / SOB / palpitations / fever.     Additional Information  Medication reconciliation completed at today's visit. : Yes        Objective    Wound Assessment(s)  Wound #4 Left, Lateral Lower Leg is a chronic Full Thickness Venous Ulcer and has recei Venous Ulcer and has received a status of Bridged. Subsequent wound encounter measurements are 10.6cm length x 4cm width x 0.1cm depth, with an area of 42.4 sq cm and a volume of 4.24 cubic cm. No tunneling has been noted. No sinus tract has been noted.  No lower extremity  (Encounter Diagnosis) B95.61 - Methicillin susceptible Staphylococcus aureus infection as the cause of diseases classified elsewhere  (Encounter Diagnosis) R60.0 - Localized edema        Procedures    Wound #4  Wound #4 (Venous Ulcer) is l Compression Wrap procedure was performed. A 2 Layers Coflex was applied with high 30-40 mmhg. The procedure was tolerated well. General Notes:  coflex 2 layer    Wound #9  Wound #9 (Venous Ulcer) is located on the left, medial lower leg.  A selective debri Apply lidocaine to wound bed on all future wound center visits during preparation for physician exam if wound bed contains fibrin or eschar. 4% Topical Lidocaine    Compression Therapy:  Coflex 2 Layer  Avoid prolonged standing in one place.   Elevate leg(

## 2019-10-22 ENCOUNTER — OFFICE VISIT (OUTPATIENT)
Dept: OPTOMETRY | Age: 74
End: 2019-10-22

## 2019-10-22 DIAGNOSIS — H25.13 NUCLEAR AGE-RELATED CATARACT, BOTH EYES: ICD-10-CM

## 2019-10-22 DIAGNOSIS — H52.223 REGULAR ASTIGMATISM OF BOTH EYES: ICD-10-CM

## 2019-10-22 DIAGNOSIS — Z86.69 HISTORY OF RETINAL DETACHMENT: Primary | ICD-10-CM

## 2019-10-22 DIAGNOSIS — H52.4 PRESBYOPIA: ICD-10-CM

## 2019-10-22 DIAGNOSIS — H52.13 MYOPIA OF BOTH EYES: ICD-10-CM

## 2019-10-22 PROCEDURE — 92015 DETERMINE REFRACTIVE STATE: CPT | Performed by: OPTOMETRIST

## 2019-10-22 PROCEDURE — 92310 CONTACT LENS FITTING OU: CPT | Performed by: OPTOMETRIST

## 2019-10-22 PROCEDURE — 92014 COMPRE OPH EXAM EST PT 1/>: CPT | Performed by: OPTOMETRIST

## 2019-10-22 RX ORDER — OMEPRAZOLE 20 MG/1
20 CAPSULE, DELAYED RELEASE ORAL
COMMUNITY
Start: 2017-08-24

## 2019-10-22 RX ORDER — OMEPRAZOLE 20 MG/1
CAPSULE, DELAYED RELEASE ORAL
Refills: 1 | COMMUNITY
Start: 2019-10-18

## 2019-10-22 RX ORDER — SULFAMETHOXAZOLE AND TRIMETHOPRIM 800; 160 MG/1; MG/1
TABLET ORAL
COMMUNITY
Start: 2017-01-26

## 2019-10-22 RX ORDER — METOLAZONE 2.5 MG/1
TABLET ORAL
Refills: 5 | COMMUNITY
Start: 2019-08-12

## 2019-10-22 RX ORDER — PRAVASTATIN SODIUM 10 MG
10 TABLET ORAL
COMMUNITY
Start: 2017-08-24

## 2019-10-22 RX ORDER — SULFAMETHOXAZOLE AND TRIMETHOPRIM 400; 80 MG/1; MG/1
TABLET ORAL
Refills: 1 | COMMUNITY
Start: 2019-09-20

## 2019-10-22 RX ORDER — PENTOXIFYLLINE 400 MG/1
TABLET, EXTENDED RELEASE ORAL
COMMUNITY
Start: 2018-09-27

## 2019-10-22 RX ORDER — ALBUTEROL SULFATE 90 UG/1
AEROSOL, METERED RESPIRATORY (INHALATION)
Refills: 1 | COMMUNITY
Start: 2019-09-16

## 2019-10-22 RX ORDER — FLUTICASONE PROPIONATE 50 MCG
SPRAY, SUSPENSION (ML) NASAL
Refills: 3 | COMMUNITY
Start: 2019-08-22

## 2019-10-22 RX ORDER — ASPIRIN 81 MG/1
81 TABLET ORAL
COMMUNITY
Start: 2017-08-24

## 2019-10-22 RX ORDER — POTASSIUM CHLORIDE 750 MG/1
TABLET, FILM COATED, EXTENDED RELEASE ORAL
Refills: 1 | COMMUNITY
Start: 2019-10-09

## 2019-10-22 RX ORDER — MONTELUKAST SODIUM 10 MG/1
TABLET ORAL
Refills: 0 | COMMUNITY
Start: 2019-10-17

## 2019-10-22 RX ORDER — DOXYCYCLINE 100 MG/1
CAPSULE ORAL
COMMUNITY
Start: 2018-10-17

## 2019-10-22 RX ORDER — FLUTICASONE PROPIONATE AND SALMETEROL 50; 500 UG/1; UG/1
POWDER RESPIRATORY (INHALATION)
Refills: 0 | COMMUNITY
Start: 2019-10-17

## 2019-10-22 RX ORDER — ALBUTEROL SULFATE 90 UG/1
AEROSOL, METERED RESPIRATORY (INHALATION)
COMMUNITY
Start: 2018-07-26

## 2019-10-22 RX ORDER — MONTELUKAST SODIUM 10 MG/1
TABLET ORAL
COMMUNITY
Start: 2016-09-01

## 2019-10-22 RX ORDER — GABAPENTIN 300 MG/1
CAPSULE ORAL
Refills: 1 | COMMUNITY
Start: 2019-08-05

## 2019-10-22 RX ORDER — FUROSEMIDE 40 MG/1
TABLET ORAL
Refills: 1 | COMMUNITY
Start: 2019-09-20

## 2019-10-22 ASSESSMENT — VISUAL ACUITY
METHOD: SNELLEN - LINEAR
OD_CC: 20/25
OD_CC: J2
OS_CC: 20/25
CORRECTION_TYPE: CONTACTS
OS_CC: J2
VA_OR_OS_CURRENT_RX: 20/20-

## 2019-10-22 ASSESSMENT — EXTERNAL EXAM - RIGHT EYE: OD_EXAM: NORMAL

## 2019-10-22 ASSESSMENT — CONF VISUAL FIELD
OD_NORMAL: 1
OS_NORMAL: 1

## 2019-10-22 ASSESSMENT — KERATOMETRY
OS_AXISANGLE2_DEGREES: 180
OS_AXISANGLE_DEGREES: 090
OS_K1POWER_DIOPTERS: 42.87
OS_K2POWER_DIOPTERS: 45.25

## 2019-10-22 ASSESSMENT — CUP TO DISC RATIO
OS_RATIO: 0.1
OD_RATIO: 0.1

## 2019-10-22 ASSESSMENT — REFRACTION_MANIFEST
OS_ADD: +2.50
OS_CYLINDER: +1.75
OS_SPHERE: -7.00
OS_AXIS: 080

## 2019-10-22 ASSESSMENT — SLIT LAMP EXAM - LIDS
COMMENTS: NORMAL
COMMENTS: NORMAL

## 2019-10-22 ASSESSMENT — REFRACTION_CURRENTRX
OD_BRAND: OPTIMUM COMFORT
OS_DIAMETER: 9.8
OD_BASECURVE: 7.54
OS_BASECURVE: 7.70
OD_SPHERE: -5.75
OD_DIAMETER: 9.6

## 2019-10-22 ASSESSMENT — TONOMETRY
OS_IOP_MMHG: 11
IOP_METHOD: TONOPEN
OD_IOP_MMHG: 9

## 2019-10-22 ASSESSMENT — EXTERNAL EXAM - LEFT EYE: OS_EXAM: NORMAL

## 2019-11-01 ENCOUNTER — OFFICE VISIT (OUTPATIENT)
Dept: WOUND CARE | Facility: HOSPITAL | Age: 74
End: 2019-11-01
Attending: INTERNAL MEDICINE
Payer: MEDICARE

## 2019-11-01 DIAGNOSIS — L08.9 INFECTED WOUND: Primary | ICD-10-CM

## 2019-11-01 DIAGNOSIS — S81.801D WOUND OF RIGHT LOWER EXTREMITY, SUBSEQUENT ENCOUNTER: ICD-10-CM

## 2019-11-01 DIAGNOSIS — T14.8XXA INFECTED WOUND: Primary | ICD-10-CM

## 2019-11-01 DIAGNOSIS — S81.802D UNSPECIFIED OPEN WOUND, LEFT LOWER LEG, SUBSEQUENT ENCOUNTER: ICD-10-CM

## 2019-11-01 DIAGNOSIS — R60.0 LOCALIZED EDEMA: ICD-10-CM

## 2019-11-01 PROCEDURE — 87070 CULTURE OTHR SPECIMN AEROBIC: CPT

## 2019-11-01 PROCEDURE — 11042 DBRDMT SUBQ TIS 1ST 20SQCM/<: CPT

## 2019-11-01 PROCEDURE — 87186 SC STD MICRODIL/AGAR DIL: CPT

## 2019-11-01 PROCEDURE — 87205 SMEAR GRAM STAIN: CPT

## 2019-11-01 PROCEDURE — 11045 DBRDMT SUBQ TISS EACH ADDL: CPT

## 2019-11-01 PROCEDURE — 87077 CULTURE AEROBIC IDENTIFY: CPT

## 2019-11-01 NOTE — PROGRESS NOTES
Chief Complaint  This information was obtained from the patient  Patient is here for a wound care follow up.  Patients stated little bit pain on the right side    Allergies  NKDA    HPI  This information was obtained from the patient    11-1-19: Wounds st eliza johnson on right leg wound today. cr 1.5 ; k 3.4 - will add extra K to the days he takes metolazone. check bmp next week.  8-2-19: Wound stable - theraskin in place - integrating.  no s/o infeciton  7-19-19: Wounds all look stable - pedal sammy still with significant fibrotic base - cross hatched today with blade. no s/o infection  4-12-19: Wounds improved.  theraskin applied on wound on RLE  4-1-19: Wound dimensions improving - no s/o infection - edema down  3-22-19: Wound dimensions improving - culture positive for staph aureus. 1-4-19: Wounds deteriorated. need labs - will culture today    12-21-18: Wounds overall stable. Edema improved - But, not much improvement in wound dimensions.     12-14-18: WOUNDS STABLE - BUT SIGNIFICANT PERIWOUND MACER to:  Constitutional Symptoms (General Health)  Eyes  Ear/Nose/Mouth/Throat  Respiratory  Cardiovascular (Central/Peripheral)  Gastrointestinal (GI)  Genitourinary ()  Neurological    General Notes:  negative except HPI- denies chest pain / Sara Kendrick / Glendell Barlow temperature of the periwound skin is Warm. Periwound skin does not exhibit signs or symptoms of infection.  Local Pulse is Normal.  General Notes:  wound measured at angle    Wound #9 Left, Medial Lower Leg is a Full Thickness Venous Ulcer and has received encounter  (Encounter Diagnosis) S81.802D - Unspecified open wound, left lower leg, subsequent encounter  (Encounter Diagnosis) I87.333 - Chronic venous hypertension (idiopathic) with ulcer and inflammation of bilateral lower extremity  (Encounter Diagnosi amount of bleeding was controlled with pressure. The procedure was tolerated well with a pain level of 0 throughout and a pain level of 0 following the procedure. Post Debridement Measurements: 10.5cm length x 3.6cm width x 0.1cm depth; with an area of 37. weeks. Please schedule for 30 minute time slot. Misc/Additional Orders  Home health care nurse for wound care. - monday and wednesday  Supplement with a daily multivitamin.   Increase dietary protein    Care summary  Discussed the Plan of Care at [facility]

## 2019-11-15 ENCOUNTER — OFFICE VISIT (OUTPATIENT)
Dept: WOUND CARE | Facility: HOSPITAL | Age: 74
End: 2019-11-15
Attending: INTERNAL MEDICINE
Payer: MEDICARE

## 2019-11-15 DIAGNOSIS — S81.801D UNSPECIFIED OPEN WOUND, RIGHT LOWER LEG, SUBSEQUENT ENCOUNTER: Primary | ICD-10-CM

## 2019-11-15 DIAGNOSIS — S81.802D UNSPECIFIED OPEN WOUND, LEFT LOWER LEG, SUBSEQUENT ENCOUNTER: ICD-10-CM

## 2019-11-15 PROCEDURE — 11045 DBRDMT SUBQ TISS EACH ADDL: CPT

## 2019-11-15 PROCEDURE — 11042 DBRDMT SUBQ TIS 1ST 20SQCM/<: CPT

## 2019-11-15 NOTE — PROGRESS NOTES
Chief Complaint  This information was obtained from the patient  Patient is here for a follow up visit for bilateral leg wounds. He states he is back to taking 400mg of Bactrim now.     Allergies  NKDA    HPI  This information was obtained from the patien plastics at [place] setting due to the distance. 8-23-19: WOunds look stable - theraskin in place. 8-16-19: Wounds improving - theraskin application left leg wounds - no c/o infection. 8-9-19: Wounds stable.  theraskin aplpicaiton on right leg wound t leg medial - merged. no s/o infection. 4-26-19: Wound improving. No s/o infection. 4-19-19: Wounds improving. Right leg skin sub slid off - did not incorporate but wound improving.   Some parts of left leg wounds still with significant fibrotic base - cr repeat BMP pending - he need refills on lasix and K which were sent to [facility] through [facility]. Batrim was refilled for 30 days Jan 11th. BMP reviewed - Cr - 1.2 which is OK.    1-11-19: Wounds much improved - culture positive for staph aureus. is extremely compliant  with low carb low salt high protein diet.     Review of Systems (ROS)  This information was obtained from the patient    Complaints and Symptoms  Patient denies complaints or symptoms related to:  Constitutional Symptoms (General Hea periwound skin exhibited: Edema, Hemosiderosis. The periwound skin did not exhibit: Induration, Dry/Scaly, Moist, Maceration. The temperature of the periwound skin is Warm. Periwound skin does not exhibit signs or symptoms of infection.  Local Pulse is Norm 25.4 cm  Vascular Assessment:  Left Extremity Pulses:  Dorsalis Pedis: Palpable  Right Extremity Pulses:  Dorsalis Pedis: Palpable          Assessment    Active Problems    ICD-10  (Encounter Diagnosis) S81.801D - Unspecified open wound, right lower leg, s devitalized tissue: biofilm, fibrin, and slough. The following instrument(s) were used: curette. Pain control was achieved using 4% Lido. A time out was not conducted prior to the start of the procedure.  A moderate amount of bleeding was controlled with pr possible. Take you diuretics as directed.     Wound #9 Left, Medial Lower Leg    Topicals:  Initial Anesthetic Order: Apply lidocaine to wound bed on all future wound center visits during preparation for physician exam if wound bed contains fibrin or escha

## 2019-12-06 ENCOUNTER — OFFICE VISIT (OUTPATIENT)
Dept: WOUND CARE | Facility: HOSPITAL | Age: 74
End: 2019-12-06
Attending: INTERNAL MEDICINE
Payer: MEDICARE

## 2019-12-06 DIAGNOSIS — S81.802D UNSPECIFIED OPEN WOUND, LEFT LOWER LEG, SUBSEQUENT ENCOUNTER: ICD-10-CM

## 2019-12-06 DIAGNOSIS — S81.801D UNSPECIFIED OPEN WOUND, RIGHT LOWER LEG, SUBSEQUENT ENCOUNTER: Primary | ICD-10-CM

## 2019-12-06 PROCEDURE — 11042 DBRDMT SUBQ TIS 1ST 20SQCM/<: CPT

## 2019-12-06 PROCEDURE — 29581 APPL MULTLAYER CMPRN SYS LEG: CPT

## 2019-12-06 PROCEDURE — 11045 DBRDMT SUBQ TISS EACH ADDL: CPT

## 2019-12-06 NOTE — PROGRESS NOTES
Chief Complaint  This information was obtained from the patient  Patient is here for a follow up visit for bilateral leg wounds. Patients has no pain and no concern    Allergies  NKDA    HPI  This information was obtained from the patient    12-6-19:  Wojuany improving - removed eliza on all - no s/o infection. Will stop skin sub treatments at this point as he had several applications already. [facility] n[facility].   8-23-19: WOunds look stable - ther infection  considering SSG  5-8-19: Wound improving overall - epithelial islands moving in.  5-3-19: All wounds improving - Some wound dimensions are larger because 2 wounds on left leg medial - merged. no s/o infection. 4-26-19: Wound improving.  No s/o today.  2-1-19: Wounds all look improved overall. Will start skin substitutes on the most granular region. No s/o infection.   1-18-19: Wounds getting better overall - Cr slightly up - repeat BMP pending - he need refills on lasix and K which were sent to [facility] wound centre over last several years. He has been in compression wraps for last 5 years. He has had apligraf placement and several other  skin subs at his previous clinic.    he is extremely compliant  with low carb low salt high protein diet. is Warm. Periwound skin does not exhibit signs or symptoms of infection. Local Pulse is Normal.  General Notes:  measurements taken on an angle    Wound #9 Left, Medial Lower Leg is a Full Thickness Venous Ulcer and has received a status of Bridged.  Subseq wound, left lower leg, subsequent encounter  (Encounter Diagnosis) I87.333 - Chronic venous hypertension (idiopathic) with ulcer and inflammation of bilateral lower extremity  (Encounter Diagnosis) B95.61 - Methicillin susceptible Staphylococcus aureus inf tolerated well with a pain level of 0 throughout and a pain level of 0 following the procedure. Post Debridement Measurements: 10cm length x 3.1cm width x 0.1cm depth; with an area of 31 sq cm and a volume of 3.1 cubic cm;     Wound #9 (Venous Ulcer) is loc multivitamin. Increase dietary protein    Care summary  Discussed the Plan of Care at bedside with patient. The patient verbally acknowledges understanding of all instructions and all questions were answered. Wound improving. No s/s of infection.

## 2019-12-27 ENCOUNTER — OFFICE VISIT (OUTPATIENT)
Dept: WOUND CARE | Facility: HOSPITAL | Age: 74
End: 2019-12-27
Attending: INTERNAL MEDICINE
Payer: MEDICARE

## 2019-12-27 DIAGNOSIS — S81.801D UNSPECIFIED OPEN WOUND, RIGHT LOWER LEG, SUBSEQUENT ENCOUNTER: Primary | ICD-10-CM

## 2019-12-27 DIAGNOSIS — S81.802D UNSPECIFIED OPEN WOUND, LEFT LOWER LEG, SUBSEQUENT ENCOUNTER: ICD-10-CM

## 2019-12-27 PROCEDURE — 11042 DBRDMT SUBQ TIS 1ST 20SQCM/<: CPT

## 2019-12-27 PROCEDURE — 11045 DBRDMT SUBQ TISS EACH ADDL: CPT

## 2019-12-27 NOTE — PROGRESS NOTES
Chief Complaint  This information was obtained from the patient  Patient is here for a wound care follow up. He denies any pain or new wound concerns.     Allergies  NKDA    HPI  This information was obtained from the patient    12-27-19: Wound on left le continues to have fibrotic base - debrided down to healthy tissue  9-6-19: Wounds stable  8-26-19: Wounds strable - improving - removed eliza on all - no s/o infection. Will stop skin sub treatments at this point as he had several applications already. overall. Except for left medial area which has not improved much.  5-17-19: Wounds improving overall. no s/o infection  considering SSG  5-8-19: Wound improving overall - epithelial islands moving in.  5-3-19:  All wounds improving - Some wound dimensions cleaned and covered with new outer dressing. Several other wounds had first application of theraskin today. 2-1-19: Wounds all look improved overall. Will start skin substitutes on the most granular region. No s/o infection.   1-18-19: Wounds getting bet do per his vein specialist.  he has had pretty much all available standard of care treatment at [facility] wound centre over last several years. He has been in compression wraps for last 5 years.   He has had apligraf placement and several other  skin sub length x 3cm width x 0.1cm depth, with an area of 27.6 sq cm and a volume of 2.76 cubic cm. No tunneling has been noted. No sinus tract has been noted. No undermining has been noted. There is a moderate amount of serous drainage noted which has no odor.  Isaac Camarena cause of diseases classified elsewhere  (Encounter Diagnosis) R60.0 - Localized edema        Procedures    Wound #7  Wound #7 (Venous Ulcer) is located on the right, medial lower leg.  A skin/subcutaneous tissue level excisional/surgical debridement with a located on the left, medial lower leg. A Multi-Layer Compression Wrap procedure was performed. A Multi-Layer Coflex was applied with high 30-40 mmhg. The procedure was tolerated well.   General Notes:  coflex 2 layer        Plan    Wound Orders:  Wound #7 R s/s of infection.

## 2020-01-10 ENCOUNTER — OFFICE VISIT (OUTPATIENT)
Dept: WOUND CARE | Facility: HOSPITAL | Age: 75
End: 2020-01-10
Attending: INTERNAL MEDICINE
Payer: MEDICARE

## 2020-01-10 DIAGNOSIS — S81.801D UNSPECIFIED OPEN WOUND, RIGHT LOWER LEG, SUBSEQUENT ENCOUNTER: Primary | ICD-10-CM

## 2020-01-10 DIAGNOSIS — S81.802D UNSPECIFIED OPEN WOUND, LEFT LOWER LEG, SUBSEQUENT ENCOUNTER: ICD-10-CM

## 2020-01-10 PROCEDURE — 11042 DBRDMT SUBQ TIS 1ST 20SQCM/<: CPT

## 2020-01-10 PROCEDURE — 11045 DBRDMT SUBQ TISS EACH ADDL: CPT

## 2020-01-10 NOTE — PROGRESS NOTES
Chief Complaint  This information was obtained from the patient  Patient is here for a follow up visit for bilateral lower leg wounds. Allergies  NKDA    HPI  This information was obtained from the patient    1-10-20:  Wound on left leg continues to im moving in. Wound on right leg same.   9-119: No acute changes - all wounds improving with epithelium moving in. continues to have fibrotic base - debrided down to healthy tissue  9-6-19: Wounds stable  8-26-19: Wounds strable - improving - removed eliza wound. Already patient is on oral lasix / k and daily preventive dose of bactrim    5-22-19: Wound improved overall.   Except for left medial area which has not improved much.  5-17-19: Wounds improving overall. no s/o infection  considering SSG  5-8-19: W improving. No s/o infection. pedal edema down  Wound areas from theraskin application last week - were rinsed and cleaned and covered with new outer dressing. Several other wounds had first application of theraskin today.   2-1-19: Wounds all look improv diagnosed with venous reflux disease of lower extremtiies and has had several treatments for the same - nothing more to do per his vein specialist.  he has had pretty much all available standard of care treatment at [facility] over last sever infection. Local Pulse is Normal.    Wound #9 Left, Medial Lower Leg is a Full Thickness Venous Ulcer and has received a status of Bridged.  Subsequent wound encounter measurements are 9cm length x 2.5cm width x 0.1cm depth, with an area of 22.5 sq cm and a Diagnosis) S81.802D - Unspecified open wound, left lower leg, subsequent encounter  (Encounter Diagnosis) I87.333 - Chronic venous hypertension (idiopathic) with ulcer and inflammation of bilateral lower extremity  (Encounter Diagnosis) B09.56 - [address] [place] [address] level of 0 throughout and a pain level of 0 following the procedure. Post Debridement Measurements: 9cm length x 2.5cm width x 0.1cm depth; with an area of 22.5 sq cm and a volume of 2.25 cubic cm;     Wound #9 (Venous Ulcer) is located on the left, medial bedside with patient. The patient verbally acknowledges understanding of all instructions and all questions were answered. Wound improving. No s/s of infection.

## 2020-01-24 ENCOUNTER — OFFICE VISIT (OUTPATIENT)
Dept: WOUND CARE | Facility: HOSPITAL | Age: 75
End: 2020-01-24
Attending: INTERNAL MEDICINE
Payer: MEDICARE

## 2020-01-24 DIAGNOSIS — S81.802D UNSPECIFIED OPEN WOUND, LEFT LOWER LEG, SUBSEQUENT ENCOUNTER: ICD-10-CM

## 2020-01-24 DIAGNOSIS — S81.801D UNSPECIFIED OPEN WOUND, RIGHT LOWER LEG, SUBSEQUENT ENCOUNTER: Primary | ICD-10-CM

## 2020-01-24 PROCEDURE — 11042 DBRDMT SUBQ TIS 1ST 20SQCM/<: CPT

## 2020-01-24 PROCEDURE — 11045 DBRDMT SUBQ TISS EACH ADDL: CPT

## 2020-01-24 NOTE — PROGRESS NOTES
Chief Complaint  This information was obtained from the patient  Patient is here for a follow up visit for bilateral lower leg wounds. Patients denies any pain on the wound.     Allergies  NKDA    HPI  This information was obtained from the patient    1-2 switch to enluxtra. 9-20-19: Wound improved slowly - the wuonds on left leg are smaller a epithelial bridges are moving in. Wound on right leg same.   9-119: No acute changes - all wounds improving with epithelium moving in. continues to have fibrotic base worsening edema Vs increasing biofilm / bioburden / vs critical colonization  Will increase diuretics  Will culture wound. Already patient is on oral lasix / k and daily preventive dose of bactrim    5-22-19: Wound improved overall.   Except for left media seems improving- no s/o infection - no significant change in dimensions though. 2-9-19: Wounds are overall improving. No s/o infection.   pedal edema down  Wound areas from theraskin application last week - were rinsed and cleaned and covered with new out after joint replacement surgeries ( hip / knee).   based on the history that he is giving me, he as been diagnosed with venous reflux disease of lower extremtiies and has had several treatments for the same - nothing more to do per his vein specialist.  he Maceration. The temperature of the periwound skin is Warm. Periwound skin does not exhibit signs or symptoms of infection. Local Pulse is Normal.    Wound #9 Left, Medial Lower Leg is a Full Thickness Venous Ulcer and has received a status of Bridged.  Subs Diagnosis) S81.802D - Unspecified open wound, left lower leg, subsequent encounter  (Encounter Diagnosis) I87.333 - Chronic venous hypertension (idiopathic) with ulcer and inflammation of bilateral lower extremity  (Encounter Diagnosis) B87.20 - [address] [place] [address] well with a pain level of 0 throughout and a pain level of 0 following the procedure. Post Debridement Measurements: 8.8cm length x 2cm width x 0.1cm depth; with an area of 17.6 sq cm and a volume of 1.76 cubic cm;     Wound #9 (Venous Ulcer) is located on daily multivitamin. Increase dietary protein    Care summary  Discussed the Plan of Care at bedside with patient. The patient verbally acknowledges understanding of all instructions and all questions were answered. Wound improving. No s/s of infection.

## 2020-01-30 ENCOUNTER — TELEPHONE (OUTPATIENT)
Dept: OPTOMETRY | Age: 75
End: 2020-01-30

## 2020-02-07 ENCOUNTER — OFFICE VISIT (OUTPATIENT)
Dept: WOUND CARE | Facility: HOSPITAL | Age: 75
End: 2020-02-07
Attending: INTERNAL MEDICINE
Payer: MEDICARE

## 2020-02-07 DIAGNOSIS — L08.9 INFECTED WOUND: ICD-10-CM

## 2020-02-07 DIAGNOSIS — S81.802D UNSPECIFIED OPEN WOUND, LEFT LOWER LEG, SUBSEQUENT ENCOUNTER: ICD-10-CM

## 2020-02-07 DIAGNOSIS — S81.801D WOUND OF RIGHT LOWER EXTREMITY, SUBSEQUENT ENCOUNTER: ICD-10-CM

## 2020-02-07 DIAGNOSIS — T14.8XXA INFECTED WOUND: ICD-10-CM

## 2020-02-07 DIAGNOSIS — S81.801D UNSPECIFIED OPEN WOUND, RIGHT LOWER LEG, SUBSEQUENT ENCOUNTER: Primary | ICD-10-CM

## 2020-02-07 DIAGNOSIS — R60.0 LOCALIZED EDEMA: ICD-10-CM

## 2020-02-07 PROCEDURE — 11042 DBRDMT SUBQ TIS 1ST 20SQCM/<: CPT

## 2020-02-07 PROCEDURE — 11045 DBRDMT SUBQ TISS EACH ADDL: CPT

## 2020-02-07 NOTE — PROGRESS NOTES
Chief Complaint  This information was obtained from the patient  Patient is here for a follow up visit for bilateral lower leg wounds.  He denies any pain and no other concern    Allergies  NKDA    HPI  This information was obtained from the patient    2- and wet at the same time. debrided well to bleeding tissue. Im worried there maybe infection again. Will culture today and switch to enluxtra. 9-20-19: Wound improved slowly - the wuonds on left leg are smaller a epithelial bridges are moving in.  Wound now.  consult plastics 6/25 appt.  5-31-19: Wounds on left leg has regressed - overall. Unclear why - differentials include worsening edema Vs increasing biofilm / bioburden / vs critical colonization  Will increase diuretics  Will culture wound.   Already honey.  last labs --> cr=1.3- on lasix + K  no s/o infection - on low dose prophylactic bactrim  2-15-19: Wounds all stable - seems improving- no s/o infection - no significant change in dimensions though. 2-9-19: Wounds are overall improving.   No s/o inf lower extremity ulcers. He hs had these wounds since 2009 - on and off, but mostly persistent since 2013. He says they started after joint replacement surgeries ( hip / knee).   based on the history that he is giving me, he as been diagnosed with venous re slough, 26-50% pink, firm granulation. The periwound skin exhibited: Edema, Dry/Scaly, Hemosiderosis. The periwound skin did not exhibit: Induration, Moist, Maceration. The temperature of the periwound skin is Warm.  Periwound skin does not exhibit signs o cm          Assessment    Active Problems    ICD-10  (Encounter Diagnosis) S81.801D - Unspecified open wound, right lower leg, subsequent encounter  (Encounter Diagnosis) S81.802D - Unspecified open wound, left lower leg, subsequent encounter  (Encounter D to loss of sensation. A time out was conducted prior to the start of the procedure. A minimal amount of bleeding was controlled with pressure. The procedure was tolerated well with a pain level of 0 throughout and a pain level of 0 following the procedure. for 30 minute time slot. Misc/Additional Orders  Home health care nurse for wound care. - monday and wednesday and Friday  Supplement with a daily multivitamin.   Increase dietary protein    Care summary  Discussed the Plan of Care at bedside with chapincito

## 2020-02-14 ENCOUNTER — OFFICE VISIT (OUTPATIENT)
Dept: WOUND CARE | Facility: HOSPITAL | Age: 75
End: 2020-02-14
Attending: INTERNAL MEDICINE
Payer: MEDICARE

## 2020-02-14 DIAGNOSIS — S81.802D UNSPECIFIED OPEN WOUND, LEFT LOWER LEG, SUBSEQUENT ENCOUNTER: ICD-10-CM

## 2020-02-14 DIAGNOSIS — S81.801D UNSPECIFIED OPEN WOUND, RIGHT LOWER LEG, SUBSEQUENT ENCOUNTER: Primary | ICD-10-CM

## 2020-02-14 PROCEDURE — 11045 DBRDMT SUBQ TISS EACH ADDL: CPT

## 2020-02-14 PROCEDURE — 11042 DBRDMT SUBQ TIS 1ST 20SQCM/<: CPT

## 2020-02-14 NOTE — PROGRESS NOTES
Chief Complaint  This information was obtained from the patient  Patient is here for a follow up visit for bilateral lower leg wounds.     Allergies  NKDA    HPI  This information was obtained from the patient    2-14-20: WOund stable - left leg wound muh Wounds not much improved - tissue in the wound base is NOT good - scarred and fibrotic and wet at the same time. debrided well to bleeding tissue. Im worried there maybe infection again. Will culture today and switch to enluxtra.   9-20-19: Wound improved  - debrided to bleeding tissue today - all wounds - continue gentamycin for now. consult plastics 6/25 appt.  5-31-19: Wounds on left leg has regressed - overall.  Unclear why - differentials include worsening edema Vs increasing biofilm / bioburden intact. right medial and left medial leg wounds still with slough  -debrided - dressed with honey.   last labs --> cr=1.3- on lasix + K  no s/o infection - on low dose prophylactic bactrim  2-15-19: Wounds all stable - seems improving- no s/o infection - n CLINIC CONSULTATION INITIAL VISIT    74 YO CM  here for evaluation and management of bilateral lower extremity ulcers. He hs had these wounds since 2009 - on and off, but mostly persistent since 2013.   He says they started after joint replacement surgeries reports a wound pain of level 0/10. The wound margin is well defined. Wound bed has 26-50% slough, 26-50% pink, firm granulation. The periwound skin exhibited: Edema, Moist, Maceration, Hemosiderosis.  The periwound skin did not exhibit: Induration, Dry/Sc measurement of 35.8 cm  Ankle Measurement 12 cm from heel with right measurement of 26 cm  Vascular Assessment:  Left Extremity Pulses:  Dorsalis Pedis: Palpable  Right Extremity Pulses:  Dorsalis Pedis: Palpable          Assessment    Active Problems    I cm was performed by Ana María Dasilva MD. Subcutaneous was removed along with devitalized tissue: necrotic/eschar and slough. The following instrument(s) were used: blade, curette, and forceps. Pain control was achieved using 4% Lido.  A time out was co Therapy:  Coflex 2 Layer  Avoid prolonged standing in one place. Elevate leg(s)as much as possible. Take you diuretics as directed. Additional Orders: Follow-Up Appointments  Return Appointment in 1 week. Please schedule for 30 minute time slot.

## 2020-02-21 ENCOUNTER — OFFICE VISIT (OUTPATIENT)
Dept: WOUND CARE | Facility: HOSPITAL | Age: 75
End: 2020-02-21
Attending: INTERNAL MEDICINE
Payer: MEDICARE

## 2020-02-21 DIAGNOSIS — S81.801D UNSPECIFIED OPEN WOUND, RIGHT LOWER LEG, SUBSEQUENT ENCOUNTER: Primary | ICD-10-CM

## 2020-02-21 DIAGNOSIS — S81.802D UNSPECIFIED OPEN WOUND, LEFT LOWER LEG, SUBSEQUENT ENCOUNTER: ICD-10-CM

## 2020-02-21 PROCEDURE — 11042 DBRDMT SUBQ TIS 1ST 20SQCM/<: CPT

## 2020-02-21 PROCEDURE — 11045 DBRDMT SUBQ TISS EACH ADDL: CPT

## 2020-02-21 NOTE — PROGRESS NOTES
Chief Complaint  This information was obtained from the patient  Patient is here for a follow up visit for bilateral lower leg wounds. Denies any pain at this time. Allergies  NKDA    HPI  This information was obtained from the patient    2-21-20:  Sarah appearing wet or macerated anymore. Completed course of antibiotics. no s/o infection now. 10-4-19; Culture positive - started dicloxacillin. periwound wet - He needs to have dressing change more often.   9-27-19: Wounds not much improved - tissue in the w Tuesday for possible SSG.  6-4-19: Wound stable - using gentamycin drops daily.  All wounds appearing clean.  6-7-19: Wounds stable - wound culture showed some staph aureus - using topical gentamycin - wounds appears  - debrided to bleeding tissue to major wound on the right leg and on the small medial left ankle wound. overall he is doing good. no s/o infection - he will need refill on lasix and K.    2-22-19: Major wounds covered with theraskin stays intact.   right medial and left medial leg wounds necrotic tissue on the larger wounds of left leg - leg circumference much bigger - seems like he is not tolerating Coflex 2. Need to r/o infeciton - he is on 7 day course of doxycycline.     11-7-18:  WOUND CLINIC CONSULTATION INITIAL VISIT    74 YO CM  he and a volume of 8.715 cubic cm. No tunneling has been noted. No sinus tract has been noted. No undermining has been noted. There is a large amount of sero-sanguineous drainage noted which has no odor. The patient reports a wound pain of level 0/10.  The wou Erythema. The temperature of the periwound skin is WNL. Periwound skin does not exhibit signs or symptoms of infection.  Local Pulse is Normal.    Vitals  Height/Length: 73 in (185.42 cm), Weight: 267 lbs (121.36 kgs), BMI: 35.2, Temperature: 98.0 °F (36.67 start of the procedure. A minimal amount of bleeding was controlled with other. The procedure was tolerated well with a pain level of 0 throughout and a pain level of 0 following the procedure.  Post Debridement Measurements: 10.5cm length x 8.3cm width x 0 place.  Elevate leg(s)as much as possible. Take you diuretics as directed. Additional Orders: Follow-Up Appointments  Return Appointment in 1 week. Please schedule for 30 minute time slot.     Misc/Additional Orders  Home health care nurse for wound

## 2020-02-28 ENCOUNTER — OFFICE VISIT (OUTPATIENT)
Dept: WOUND CARE | Facility: HOSPITAL | Age: 75
End: 2020-02-28
Attending: INTERNAL MEDICINE
Payer: MEDICARE

## 2020-02-28 DIAGNOSIS — R60.0 LOCALIZED EDEMA: ICD-10-CM

## 2020-02-28 DIAGNOSIS — S81.801D UNSPECIFIED OPEN WOUND, RIGHT LOWER LEG, SUBSEQUENT ENCOUNTER: Primary | ICD-10-CM

## 2020-02-28 DIAGNOSIS — S81.802D UNSPECIFIED OPEN WOUND, LEFT LOWER LEG, SUBSEQUENT ENCOUNTER: ICD-10-CM

## 2020-02-28 PROCEDURE — 87070 CULTURE OTHR SPECIMN AEROBIC: CPT

## 2020-02-28 PROCEDURE — 29581 APPL MULTLAYER CMPRN SYS LEG: CPT

## 2020-02-28 PROCEDURE — 87205 SMEAR GRAM STAIN: CPT

## 2020-02-28 PROCEDURE — 11045 DBRDMT SUBQ TISS EACH ADDL: CPT

## 2020-02-28 PROCEDURE — 11042 DBRDMT SUBQ TIS 1ST 20SQCM/<: CPT

## 2020-02-28 PROCEDURE — 87186 SC STD MICRODIL/AGAR DIL: CPT

## 2020-02-28 PROCEDURE — 87077 CULTURE AEROBIC IDENTIFY: CPT

## 2020-02-28 NOTE — PROGRESS NOTES
Chief Complaint  This information was obtained from the patient  Patient is here for a follow up visit for bilateral lower leg wounds. Denies any pain at this time.     Allergies  NKDA    HPI  This information was obtained from the patient    2-28-20: [facility] wound clinic / plastics consult for excisional debridement in [place] + [facility].   He does not want to drive that far.  still on preventive low dose antibiotics and lasix  10-11-19: Wounds stable overall - periwound not appearing wet or macerate tissue. theraskin reapplied on left leg medial wound  6-28-19; Wounds stable and clean - Plastics consult appreciated. Patients prefers to continue with skin substitutes  6-21-19: Wound appears clean and stable.  Appt with plastics on Tuesday for possible either. Theraskin in place. 3-1-19: Wounds improved - left leg wounds where we had applied theraskin looks v good  -looks like graft is taking - well adherent - did not reapply on those wounds today.   We started theraskin on the major wound on the right comprilan well - had pain and rash along ankle. Did not like iodosorb - it burned. Leg measurements much better - wounds looks improved    11-16-18:  Wounds have deteriorated significantly since last visit - Significant thick slough / necrotic tissue on t Subsequent wound encounter measurements are 12.5cm length x 8.5cm width x 0.1cm depth, with an area of 106.25 sq cm and a volume of 10.625 cubic cm. No tunneling has been noted. No sinus tract has been noted. No undermining has been noted.  There is a large noted. There was no drainage noted. The patient reports a wound pain of level 0/10. The wound margin is well defined. Wound bed has 26-50% slough, 26-50% pink, firm granulation. The periwound skin exhibited: Edema.  The periwound skin did not exhibit: Eryt leg. A skin/subcutaneous tissue level excisional/surgical debridement with a total area debrided of 106.25 sq cm was performed by Carmina Epperson MD. Subcutaneous was removed along with devitalized tissue: slough.  The following instrument(s) were use Dressings  May shower with protection. Silver alginate  Kerramax/Super absorbent  ABD pad - Prn  Kerlix  Change dressing 3x/week    Compression Therapy:  Coflex 2 Layer  Avoid prolonged standing in one place. Elevate leg(s)as much as possible.   Take you

## 2020-03-13 ENCOUNTER — OFFICE VISIT (OUTPATIENT)
Dept: WOUND CARE | Facility: HOSPITAL | Age: 75
End: 2020-03-13
Attending: INTERNAL MEDICINE
Payer: MEDICARE

## 2020-03-13 DIAGNOSIS — S81.802D UNSPECIFIED OPEN WOUND, LEFT LOWER LEG, SUBSEQUENT ENCOUNTER: ICD-10-CM

## 2020-03-13 DIAGNOSIS — R60.0 LOCALIZED EDEMA: ICD-10-CM

## 2020-03-13 DIAGNOSIS — S81.801D UNSPECIFIED OPEN WOUND, RIGHT LOWER LEG, SUBSEQUENT ENCOUNTER: Primary | ICD-10-CM

## 2020-03-13 PROCEDURE — 11045 DBRDMT SUBQ TISS EACH ADDL: CPT

## 2020-03-13 PROCEDURE — 11042 DBRDMT SUBQ TIS 1ST 20SQCM/<: CPT

## 2020-03-13 NOTE — PROGRESS NOTES
Chief Complaint  This information was obtained from the patient  Patient is here for a follow up visit for bilateral lower leg wounds. Denies any pain at this time. Allergies  NKDA    HPI  This information was obtained from the patient    3-13-20:  Sarah sloughy and spongy - cultured right leg wound.   10-18-19: Wounds unchanged - minimally better - overall same - discussed with patient again about options of [facility] wound clinic / plastics consult for excisional left leg and right leg wound    7-12-19; Wound on left leg where graft was placed - decreasing in size  scar tissue present in the base of other wounds - debrided down to bleeding tissue. theraskin reapplied on left leg medial wound  6-28-19;  Wounds stabl infection - leg swelling improved - Continues t be on lasix and bactrim prophylactically. 3-8-19: Wounds look awesome - shrunk significantly - legs not swollen anymore - no redness either. Theraskin in place.   3-1-19: Wounds improved - left leg wounds wh stable - one wound closed. 11-30-18: WOunds stable and slightly better. 11-21-18: Wound culture + enterobacter - started CIPRO - will DC doxycycline. He did not tolerate comprilan well - had pain and rash along ankle.   Did not like iodosorb - it bur pain / periwound redness -    Additional Information  Medication reconciliation completed at today's visit. : Yes        Objective    Wound Assessment(s)  Wound #7 Right, Medial Lower Leg is an acute Full Thickness Venous Ulcer and has received a status of Injury Pressure Ulcer and has received a status of Not Healed. Subsequent wound encounter measurements are 0.9cm length x 0.9cm width x 0.1cm depth, with an area of 0.81 sq cm and a volume of 0.081 cubic cm. No tunneling has been noted.  No sinus tract has susceptible Staphylococcus aureus infection as the cause of diseases classified elsewhere  (Encounter Diagnosis) R60.0 - Localized edema        Procedures    Wound #7  Wound #7 (Venous Ulcer) is located on the right, medial lower leg.  A skin/subcutaneous t was tolerated well.         Plan    Wound Orders:  Wound #7 Right, Medial Lower Leg    Topicals:  Initial Anesthetic Order: Apply lidocaine to wound bed on all future wound center visits during preparation for physician exam if wound bed contains fibrin or infection.

## 2020-03-20 ENCOUNTER — APPOINTMENT (OUTPATIENT)
Dept: WOUND CARE | Facility: HOSPITAL | Age: 75
End: 2020-03-20
Attending: INTERNAL MEDICINE
Payer: MEDICARE

## 2020-06-05 ENCOUNTER — OFFICE VISIT (OUTPATIENT)
Dept: WOUND CARE | Facility: HOSPITAL | Age: 75
End: 2020-06-05
Attending: INTERNAL MEDICINE
Payer: MEDICARE

## 2020-06-05 DIAGNOSIS — S81.801D UNSPECIFIED OPEN WOUND, RIGHT LOWER LEG, SUBSEQUENT ENCOUNTER: Primary | ICD-10-CM

## 2020-06-05 DIAGNOSIS — S81.802D UNSPECIFIED OPEN WOUND, LEFT LOWER LEG, SUBSEQUENT ENCOUNTER: ICD-10-CM

## 2020-06-05 DIAGNOSIS — R60.0 LOCALIZED EDEMA: ICD-10-CM

## 2020-06-05 PROCEDURE — 11042 DBRDMT SUBQ TIS 1ST 20SQCM/<: CPT

## 2020-06-05 PROCEDURE — 29581 APPL MULTLAYER CMPRN SYS LEG: CPT

## 2020-06-05 PROCEDURE — 99215 OFFICE O/P EST HI 40 MIN: CPT

## 2020-06-05 PROCEDURE — 11045 DBRDMT SUBQ TISS EACH ADDL: CPT

## 2020-06-05 NOTE — PROGRESS NOTES
Chief Complaint  This information was obtained from the patient  Patient is here for a follow up visit for bilateral lower leg wounds. Denies any pain at this time. Allergies  NKDA    HPI  This information was obtained from the patient    6-5-20:  Gm Abdul much.  11-1-19: Wounds stable - left lateral wound superficial with  epithelium +++  Right leg wound painful with some malodor and tissue looks v sloughy and spongy - cultured right leg wound.   10-18-19: Wounds unchanged - minimally better - overall same - look stable - pedal edema is down - no s/o infection - no complaints. theraskin in place on medial left leg placed. theraskin applied on lateral left leg and right leg wound    7-12-19;  Wound on left leg where graft was placed - decreasing in size  scar Wound dimensions improving - Plan on grafting RLE  wound today as it is week 3. No s/o infection. 3-15-19: Dimensions improved significantly. No s/o infection - leg swelling improved - Continues t be on lasix and bactrim prophylactically.   3-8-19: Wounds SIGNIFICANT PERIWOUND MACERATION AND SKIN BREAKDOWNS BOTH LEGS - UNCLEAR WHY  NO PURULENT DISCHARGE / NEW PAIN. 12-7-18: Legs swollen - wounds stable - one wound closed. 11-30-18: WOunds stable and slightly better. 11-21-18:  Wound culture + entero measurement. Additional Information  Medication reconciliation completed at today's visit. : Yes        Objective    Wound Assessment(s)  Wound #7 Right, Medial Lower Leg is an acute Full Thickness Venous Ulcer and has received a status of Not Healed.  Terri Manual Healed. Subsequent wound encounter measurements are 0.3cm length x 0.3cm width x 0.1cm depth, with an area of 0.09 sq cm and a volume of 0.009 cubic cm. No tunneling has been noted. No sinus tract has been noted. No undermining has been noted.  There was no B95.61 - Methicillin susceptible Staphylococcus aureus infection as the cause of diseases classified elsewhere  (Encounter Diagnosis) R60.0 - Localized edema        Procedures    Wound #7  Wound #7 (Venous Ulcer) is located on the right, medial lower leg. possible. Take you diuretics as directed. Follow-Up Appointments:  Return Appointment in 2 weeks.     Wound #9 Left, Medial Lower Leg    Topicals:  Initial Anesthetic Order: Apply lidocaine to wound bed on all future wound center visits during preparati

## 2020-06-19 ENCOUNTER — OFFICE VISIT (OUTPATIENT)
Dept: WOUND CARE | Facility: HOSPITAL | Age: 75
End: 2020-06-19
Attending: INTERNAL MEDICINE
Payer: MEDICARE

## 2020-06-19 DIAGNOSIS — S81.802D UNSPECIFIED OPEN WOUND, LEFT LOWER LEG, SUBSEQUENT ENCOUNTER: ICD-10-CM

## 2020-06-19 DIAGNOSIS — R60.0 LOCALIZED EDEMA: ICD-10-CM

## 2020-06-19 DIAGNOSIS — S81.801D UNSPECIFIED OPEN WOUND, RIGHT LOWER LEG, SUBSEQUENT ENCOUNTER: Primary | ICD-10-CM

## 2020-06-19 PROCEDURE — 11045 DBRDMT SUBQ TISS EACH ADDL: CPT

## 2020-06-19 PROCEDURE — 11042 DBRDMT SUBQ TIS 1ST 20SQCM/<: CPT

## 2020-06-19 NOTE — PROGRESS NOTES
Chief Complaint  This information was obtained from the patient  Patient is here for a follow up visit for bilateral lower leg wounds. Denies any pain at this time.  Pt states his home health didn't have coflex wraps and arrives with cotton, kerlex and co closed. Medial wound with epithelial islands advancing. Right leg wound slightly improved - but not as much as the others. 11-17-19:Wound culture positive - resumed full dose bactrim - completed. Left leg lateral wound almost fully closed.   left leg med theraskin application left leg wounds - no c/o infection. 8-9-19: Wounds stable. theraskin aplpicaiton on right leg wound today. cr 1.5 ; k 3.4 - will add extra K to the days he takes metolazone.  check bmp next week.  8-2-19: Wound stable - theraskin in slid off - did not incorporate but wound improving. Some parts of left leg wounds still with significant fibrotic base - cross hatched today with blade. no s/o infection  4-12-19: Wounds improved.  theraskin applied on wound on RLE  4-1-19: Wound dimensio Jan 11th. [facility] reviewed - Cr - 1.2 which is OK.    1-11-19: Wounds much improved - culture positive for staph aureus. 1-4-19: Wounds deteriorated. need labs - will culture today    12-21-18: Wounds overall stable.  Edema improved - But, not much improvemen patient    Complaints and Symptoms  Patient denies complaints or symptoms related to:  Constitutional Symptoms (General Health)  Eyes  Ear/Nose/Mouth/Throat  Respiratory  Cardiovascular (Central/Peripheral)  Gastrointestinal (GI)  Genitourinary ()  Neuro WNL. Periwound skin does not exhibit signs or symptoms of infection. Local Pulse is Weak. General Notes:  length and width measured at an angle    Wound #9 Left, Medial Lower Leg is a Full Thickness Venous Ulcer and has received a status of Bridged.  Carlos Perdomo Edema is present  Compression Device In Use: Yes  Device Used Correctly: Yes  Compression Device Used: Multi-Layer Wrap  Calf Measurement 34 cm from heel with left measurement of 35.7 cm  Ankle Measurement 12 cm from heel with left measurement of 25 cm  Ri 5.5cm width x 0.2cm depth; with an area of 57.75 sq cm and a volume of 11.55 cubic cm; Wound #7 (Venous Ulcer) is located on the right, medial lower leg.  A Multi-Layer Compression Wrap procedure was performed by Shelly Paul MD. A Multi-Layer C Orders:    Misc/Additional Orders:  Supplement with a daily multivitamin  Increase protein into diet    Care summary  Discussed the Plan of Care at bedside with patient.  The patient verbally acknowledges understanding of all instructions and all questions

## 2020-07-03 ENCOUNTER — OFFICE VISIT (OUTPATIENT)
Dept: WOUND CARE | Facility: HOSPITAL | Age: 75
End: 2020-07-03
Attending: INTERNAL MEDICINE
Payer: MEDICARE

## 2020-07-03 DIAGNOSIS — S81.801D UNSPECIFIED OPEN WOUND, RIGHT LOWER LEG, SUBSEQUENT ENCOUNTER: Primary | ICD-10-CM

## 2020-07-03 DIAGNOSIS — S81.802D UNSPECIFIED OPEN WOUND, LEFT LOWER LEG, SUBSEQUENT ENCOUNTER: ICD-10-CM

## 2020-07-03 DIAGNOSIS — R60.0 LOCALIZED EDEMA: ICD-10-CM

## 2020-07-03 PROCEDURE — 29581 APPL MULTLAYER CMPRN SYS LEG: CPT

## 2020-07-03 NOTE — PROGRESS NOTES
Chief Complaint  This information was obtained from the patient  Patient is here for a follow up visit for bilateral lower leg wounds. Pt states no new complaints or pain.     Allergies  NKDA    HPI  This information was obtained from the patient    7-3-2 islands advancing. Right leg wound slightly improved - but not as much as the others. 11-17-19:Wound culture positive - resumed full dose bactrim - completed. Left leg lateral wound almost fully closed. left leg medial wound looks improved.   right leg no c/o infection. 8-9-19: Wounds stable. theraskin aplpicaiton on right leg wound today. cr 1.5 ; k 3.4 - will add extra K to the days he takes metolazone. check bmp next week.  8-2-19: Wound stable - theraskin in place - integrating.  no s/o infeciton  7 improving. Some parts of left leg wounds still with significant fibrotic base - cross hatched today with blade. no s/o infection  4-12-19: Wounds improved.  theraskin applied on wound on RLE  4-1-19: Wound dimensions improving - no s/o infection - edema d is OK.    1-11-19: Wounds much improved - culture positive for staph aureus. 1-4-19: Wounds deteriorated. need labs - will culture today    12-21-18: Wounds overall stable. Edema improved - But, not much improvement in wound dimensions.     12-14-18: WOUND denies complaints or symptoms related to:  Constitutional Symptoms (General Health)  Eyes  Ear/Nose/Mouth/Throat  Respiratory  Cardiovascular (Central/Peripheral)  Gastrointestinal (GI)  Genitourinary ()  Neurological    General Notes:  negative except H Pulse is Weak. General Notes:  width at slight angle    Wound #9 Left, Medial Lower Leg is a Full Thickness Venous Ulcer and has received a status of Bridged.  Subsequent wound encounter measurements are 0cm length x 0cm width with no measurable depth, wit from heel with left measurement of 35.6 cm  Ankle Measurement 12 cm from heel with left measurement of 24.2 cm  Right Extremity: Edema is present  Compression Device In Use: Yes  Device Used Correctly: Yes  Compression Device Used: Multi-Layer Wrap  Calf M Layer  Avoid prolonged standing in one place. Elevate leg(s)as much as possible. Take you diuretics as directed. Follow-Up Appointments:  Return Appointment in 2 weeks.     Wound #9 Left, Medial Lower Leg    Topicals:  Initial Anesthetic Order: Apply l

## 2020-07-17 ENCOUNTER — OFFICE VISIT (OUTPATIENT)
Dept: WOUND CARE | Facility: HOSPITAL | Age: 75
End: 2020-07-17
Attending: INTERNAL MEDICINE
Payer: MEDICARE

## 2020-07-17 DIAGNOSIS — R60.0 LOCALIZED EDEMA: ICD-10-CM

## 2020-07-17 DIAGNOSIS — B95.61 METHICILLIN SUSCEPTIBLE STAPHYLOCOCCUS AUREUS INFECTION AS THE CAUSE OF DISEASES CLASSIFIED ELSEWHERE: ICD-10-CM

## 2020-07-17 DIAGNOSIS — L97.919 CHRONIC VENOUS HYPERTENSION (IDIOPATHIC) WITH ULCER AND INFLAMMATION OF RIGHT LOWER EXTREMITY (HCC): ICD-10-CM

## 2020-07-17 DIAGNOSIS — S81.801D UNSPECIFIED OPEN WOUND, RIGHT LOWER LEG, SUBSEQUENT ENCOUNTER: Primary | ICD-10-CM

## 2020-07-17 DIAGNOSIS — I87.331 CHRONIC VENOUS HYPERTENSION (IDIOPATHIC) WITH ULCER AND INFLAMMATION OF RIGHT LOWER EXTREMITY (HCC): ICD-10-CM

## 2020-07-17 PROCEDURE — 29581 APPL MULTLAYER CMPRN SYS LEG: CPT

## 2020-07-17 NOTE — PROGRESS NOTES
Chief Complaint  This information was obtained from the patient  Patient is here for a wound care follow up. He denies any pain or new wound concerns. Allergies  NKDA    HPI  This information was obtained from the patient    7-17-20:  Wound much improv Medial wound with epithelial islands advancing. Right leg wound slightly improved - but not as much as the others. 11-17-19:Wound culture positive - resumed full dose bactrim - completed. Left leg lateral wound almost fully closed.   left leg medial woun application left leg wounds - no c/o infection. 8-9-19: Wounds stable. theraskin aplpicaiton on right leg wound today. cr 1.5 ; k 3.4 - will add extra K to the days he takes metolazone.  check bmp next week.  8-2-19: Wound stable - theraskin in place - in did not incorporate but wound improving. Some parts of left leg wounds still with significant fibrotic base - cross hatched today with blade. no s/o infection  4-12-19: Wounds improved.  theraskin applied on wound on RLE  4-1-19: Wound dimensions improvin 11th.  [facility] reviewed - Cr - 1.2 which is OK.    1-11-19: Wounds much improved - culture positive for staph aureus. 1-4-19: Wounds deteriorated. need labs - will culture today    12-21-18: Wounds overall stable.  Edema improved - But, not much improvement in patient    Complaints and Symptoms  Patient denies complaints or symptoms related to:  Constitutional Symptoms (General Health)  Eyes  Ear/Nose/Mouth/Throat  Respiratory  Cardiovascular (Central/Peripheral)  Gastrointestinal (GI)  Genitourinary ()  Neuro temperature of the periwound skin is WNL. Periwound skin does not exhibit signs or symptoms of infection. Local Pulse is Weak.     Vitals  Height/Length: 73 in (185.42 cm), Weight: 267 lbs (121.36 kgs), BMI: 35.2, Pulse: 61 bpm, Respiratory Rate: 16 breaths MD. A Multi-Layer Coflex was applied with high 30-40 mmhg. The procedure was tolerated well.   General Notes:  Coflex 2 layer        Plan    Wound Orders:  Wound #7 Right, Medial Lower Leg    Topicals:  Initial Anesthetic Order: Apply lidocaine to wound bed

## 2020-07-31 ENCOUNTER — OFFICE VISIT (OUTPATIENT)
Dept: WOUND CARE | Facility: HOSPITAL | Age: 75
End: 2020-07-31
Attending: INTERNAL MEDICINE
Payer: MEDICARE

## 2020-07-31 DIAGNOSIS — S81.801D UNSPECIFIED OPEN WOUND, RIGHT LOWER LEG, SUBSEQUENT ENCOUNTER: Primary | ICD-10-CM

## 2020-07-31 DIAGNOSIS — L97.919 CHRONIC VENOUS HYPERTENSION (IDIOPATHIC) WITH ULCER AND INFLAMMATION OF RIGHT LOWER EXTREMITY (HCC): ICD-10-CM

## 2020-07-31 DIAGNOSIS — S81.802D UNSPECIFIED OPEN WOUND, LEFT LOWER LEG, SUBSEQUENT ENCOUNTER: ICD-10-CM

## 2020-07-31 DIAGNOSIS — R60.0 LOCALIZED EDEMA: ICD-10-CM

## 2020-07-31 DIAGNOSIS — I87.331 CHRONIC VENOUS HYPERTENSION (IDIOPATHIC) WITH ULCER AND INFLAMMATION OF RIGHT LOWER EXTREMITY (HCC): ICD-10-CM

## 2020-07-31 DIAGNOSIS — B95.61 METHICILLIN SUSCEPTIBLE STAPHYLOCOCCUS AUREUS INFECTION AS THE CAUSE OF DISEASES CLASSIFIED ELSEWHERE: ICD-10-CM

## 2020-07-31 PROCEDURE — 97597 DBRDMT OPN WND 1ST 20 CM/<: CPT

## 2020-07-31 RX ORDER — SULFAMETHOXAZOLE AND TRIMETHOPRIM 400; 80 MG/1; MG/1
1 TABLET ORAL DAILY
Qty: 30 TABLET | Refills: 1 | Status: SHIPPED | OUTPATIENT
Start: 2020-07-31 | End: 2020-09-11 | Stop reason: ALTCHOICE

## 2020-07-31 NOTE — PROGRESS NOTES
Chief Complaint  This information was obtained from the patient  Patient is here for a wound care follow up. He denies any pain or new wound concerns.     Allergies  NKDA    HPI  This information was obtained from the patient    7/31/20- Wounds almost all left leg improved. second bridging almost complete.  right leg wound stalled - thick slough ++  no s/o infection. 12-6-19: Wound much improved - left leg lateral wound closed. Medial wound with epithelial islands advancing.   Right leg wound slightly impr already. he does nto want to [facility] plastics [facility] due to the distance. 8-23-19: WOunds look stable - theraskin in place. 8-16-19: Wounds improving - theraskin application left leg wounds - no c/o infection. 8-9-19: Wounds stable.  thera are larger because 2 wounds on left leg medial - merged. no s/o infection. 4-26-19: Wound improving. No s/o infection. 4-19-19: Wounds improving. Right leg skin sub slid off - did not incorporate but wound improving.   Some parts of left leg wounds still better overall - Cr slightly up - repeat BMP pending - he need refills on lasix and K which were sent to [facility] through [facility]. Batrim was refilled for 30 days Jan 11th.   BMP reviewed - Cr - 1.2 which is OK.    1-11-19: Wounds much improved - cu subs at his previous clinic.    he is extremely compliant  with low carb low salt high protein diet. General Notes:  Wounds improving, continue POC.  KStevens [id number]    Review of Systems (ROS)  This information was obtained from the patient    Complai wound margin is well defined. Wound bed has 26-50% slough, 26-50% pink, firm granulation. The periwound skin exhibited: Edema, Dry/Scaly, Hemosiderosis. The periwound skin did not exhibit: Induration, Moist, Maceration.  The temperature of the periwound sk S81.801D - Unspecified open wound, right lower leg, subsequent encounter  (Encounter Diagnosis) S81.802D - Unspecified open wound, left lower leg, subsequent encounter  (Encounter Diagnosis) I87.333 - Chronic venous hypertension (idiopathic) with ulcer and lidocaine to wound bed on all future wound center visits during preparation for physician exam if wound bed contains fibrin or eschar. 4% Topical Lidocaine    Wound Cleansing & Dressings:  May shower with protection.   Endoform Collagen  Hydrofera Transfer

## 2020-08-14 ENCOUNTER — OFFICE VISIT (OUTPATIENT)
Dept: WOUND CARE | Facility: HOSPITAL | Age: 75
End: 2020-08-14
Attending: INTERNAL MEDICINE
Payer: MEDICARE

## 2020-08-14 DIAGNOSIS — B95.61 METHICILLIN SUSCEPTIBLE STAPHYLOCOCCUS AUREUS INFECTION AS THE CAUSE OF DISEASES CLASSIFIED ELSEWHERE: ICD-10-CM

## 2020-08-14 DIAGNOSIS — R60.0 LOCALIZED EDEMA: ICD-10-CM

## 2020-08-14 DIAGNOSIS — S81.802D UNSPECIFIED OPEN WOUND, LEFT LOWER LEG, SUBSEQUENT ENCOUNTER: ICD-10-CM

## 2020-08-14 DIAGNOSIS — S81.801D UNSPECIFIED OPEN WOUND, RIGHT LOWER LEG, SUBSEQUENT ENCOUNTER: Primary | ICD-10-CM

## 2020-08-14 DIAGNOSIS — L97.919 CHRONIC VENOUS HYPERTENSION (IDIOPATHIC) WITH ULCER AND INFLAMMATION OF RIGHT LOWER EXTREMITY (HCC): ICD-10-CM

## 2020-08-14 DIAGNOSIS — I87.331 CHRONIC VENOUS HYPERTENSION (IDIOPATHIC) WITH ULCER AND INFLAMMATION OF RIGHT LOWER EXTREMITY (HCC): ICD-10-CM

## 2020-08-14 PROCEDURE — 97597 DBRDMT OPN WND 1ST 20 CM/<: CPT

## 2020-08-14 NOTE — PROGRESS NOTES
Chief Complaint  This information was obtained from the patient  Patient is here for a wound care follow up. He denies any pain or new wound concerns.     Allergies  NKDA    HPI  This information was obtained from the patient    8/14/20 - All previous wou left leg continues to improve with increased epithelial bridging. Right leg wound stable. no s/o infection. 12-27-19: Wound on left leg improved. second bridging almost complete.  right leg wound stalled - thick slough ++  no s/o infection. 12-6-19:  Wo improving - removed eliza on all - no s/o infection. Will stop skin sub treatments at this point as he had several applications already. he does nto want to go see plastics at [facility] setting due to the distance.   8-23-19: WOunds look stable - ther infection  considering SSG  5-8-19: Wound improving overall - epithelial islands moving in.  5-3-19: All wounds improving - Some wound dimensions are larger because 2 wounds on left leg medial - merged. no s/o infection. 4-26-19: Wound improving.  No s/o today.  2-1-19: Wounds all look improved overall. Will start skin substitutes on the most granular region. No s/o infection.   1-18-19: Wounds getting better overall - Cr slightly up - repeat BMP pending - he need refills on lasix and K which were sent to [facility] wound centre over last several years. He has been in compression wraps for last 5 years. He has had apligraf placement and several other  skin subs at his previous clinic.    he is extremely compliant  with low carb low salt high protein diet. odor. The patient reports a wound pain of level 0/10. The wound margin is well defined. Wound bed has % pink, firm granulation. The periwound skin exhibited: Edema, Dry/Scaly, Hemosiderosis.  The periwound skin did not exhibit: Induration, Moist, Mac right measurement of 23.8 cm          Assessment    Active Problems    ICD-10  (Encounter Diagnosis) S81.801D - Unspecified open wound, right lower leg, subsequent encounter  (Encounter Diagnosis) S81.802D - Unspecified open wound, left lower leg, subseque physician exam if wound bed contains fibrin or eschar. 4% Topical Lidocaine    Wound Cleansing & Dressings:  May shower with protection. Enluxtra humidifiber  Change Dressing 3x/week    Compression Therapy:  Coflex 2 Layer  Compression to Right Leg.   Com

## 2020-08-28 ENCOUNTER — OFFICE VISIT (OUTPATIENT)
Dept: WOUND CARE | Facility: HOSPITAL | Age: 75
End: 2020-08-28
Attending: INTERNAL MEDICINE
Payer: MEDICARE

## 2020-08-28 DIAGNOSIS — S81.802D UNSPECIFIED OPEN WOUND, LEFT LOWER LEG, SUBSEQUENT ENCOUNTER: ICD-10-CM

## 2020-08-28 DIAGNOSIS — R60.0 LOCALIZED EDEMA: ICD-10-CM

## 2020-08-28 DIAGNOSIS — L97.919 CHRONIC VENOUS HYPERTENSION (IDIOPATHIC) WITH ULCER AND INFLAMMATION OF RIGHT LOWER EXTREMITY (HCC): ICD-10-CM

## 2020-08-28 DIAGNOSIS — I87.331 CHRONIC VENOUS HYPERTENSION (IDIOPATHIC) WITH ULCER AND INFLAMMATION OF RIGHT LOWER EXTREMITY (HCC): ICD-10-CM

## 2020-08-28 DIAGNOSIS — B95.61 METHICILLIN SUSCEPTIBLE STAPHYLOCOCCUS AUREUS INFECTION AS THE CAUSE OF DISEASES CLASSIFIED ELSEWHERE: ICD-10-CM

## 2020-08-28 DIAGNOSIS — T14.8XXA INFECTED WOUND: Primary | ICD-10-CM

## 2020-08-28 DIAGNOSIS — S81.801D UNSPECIFIED OPEN WOUND, RIGHT LOWER LEG, SUBSEQUENT ENCOUNTER: ICD-10-CM

## 2020-08-28 DIAGNOSIS — L08.9 INFECTED WOUND: Primary | ICD-10-CM

## 2020-08-28 PROCEDURE — 87186 SC STD MICRODIL/AGAR DIL: CPT

## 2020-08-28 PROCEDURE — 87077 CULTURE AEROBIC IDENTIFY: CPT

## 2020-08-28 PROCEDURE — 87070 CULTURE OTHR SPECIMN AEROBIC: CPT

## 2020-08-28 PROCEDURE — 29581 APPL MULTLAYER CMPRN SYS LEG: CPT

## 2020-08-28 PROCEDURE — 87205 SMEAR GRAM STAIN: CPT

## 2020-08-28 NOTE — PROGRESS NOTES
Chief Complaint  This information was obtained from the patient  Patient is here for a wound care follow up. He denies any pain or new wound concerns.     Allergies  NKDA    HPI  This information was obtained from the patient    8/28/20- Wounds on right l epithelium +++  right leg wound stable - Kerecis sample # 1 today. 1-24-20: Wound on left leg so much improved  Right leg wound   1-10-20: Wound on left leg continues to improve with increased epithelial bridging. Right leg wound stable.  no s/o in improving with epithelium moving in. continues to have fibrotic base - debrided down to healthy tissue  9-6-19: Wounds stable  8-26-19: Wounds strable - improving - removed theraskin on all - no s/o infection.  Will stop skin sub treatments at this point as bactrim    5-22-19: Wound improved overall. Except for left medial area which has not improved much.  5-17-19: Wounds improving overall. no s/o infection  considering SSG  5-8-19: Wound improving overall - epithelial islands moving in.  5-3-19:  All wounds application last week - were rinsed and cleaned and covered with new outer dressing. Several other wounds had first application of theraskin today. 2-1-19: Wounds all look improved overall. Will start skin substitutes on the most granular region.   No s/o treatments for the same - nothing more to do per his vein specialist.  he has had pretty much all available standard of care treatment at [facility] wound [place] over last several years. He has been in compression wraps for last 5 years.   He has had apli measurable depth, with an area of 0.01 sq cm . There is a scant amount of serous drainage noted which has no odor. The patient reports a wound pain of level 0/10. The wound margin is well defined. Wound bed has % pink, firm granulation.   The periwoun with right measurement of 23 cm          Assessment    Active Problems    ICD-10  (Encounter Diagnosis) S81.801D - Unspecified open wound, right lower leg, subsequent encounter  (Encounter Diagnosis) S81.802D - Unspecified open wound, left lower leg, subse Leg    Wound Cleansing & Dressings:  May shower with protection.   Endoform Collagen  Hydrofera Transfer  Kerramax/Super absorbent  Change Dressing 3x/week    Additional Orders:    Misc/Additional Orders:  Supplement with a daily multivitamin  Increase prot

## 2020-08-31 RX ORDER — CIPROFLOXACIN 500 MG/1
500 TABLET, FILM COATED ORAL 2 TIMES DAILY
Qty: 20 TABLET | Refills: 0 | Status: SHIPPED | OUTPATIENT
Start: 2020-08-31 | End: 2020-09-11

## 2020-09-11 ENCOUNTER — OFFICE VISIT (OUTPATIENT)
Dept: WOUND CARE | Facility: HOSPITAL | Age: 75
End: 2020-09-11
Attending: INTERNAL MEDICINE
Payer: MEDICARE

## 2020-09-11 DIAGNOSIS — S81.801D UNSPECIFIED OPEN WOUND, RIGHT LOWER LEG, SUBSEQUENT ENCOUNTER: Primary | ICD-10-CM

## 2020-09-11 DIAGNOSIS — I87.331 CHRONIC VENOUS HYPERTENSION (IDIOPATHIC) WITH ULCER AND INFLAMMATION OF RIGHT LOWER EXTREMITY (HCC): ICD-10-CM

## 2020-09-11 DIAGNOSIS — B95.61 METHICILLIN SUSCEPTIBLE STAPHYLOCOCCUS AUREUS INFECTION AS THE CAUSE OF DISEASES CLASSIFIED ELSEWHERE: ICD-10-CM

## 2020-09-11 DIAGNOSIS — S81.802D UNSPECIFIED OPEN WOUND, LEFT LOWER LEG, SUBSEQUENT ENCOUNTER: ICD-10-CM

## 2020-09-11 DIAGNOSIS — L97.919 CHRONIC VENOUS HYPERTENSION (IDIOPATHIC) WITH ULCER AND INFLAMMATION OF RIGHT LOWER EXTREMITY (HCC): ICD-10-CM

## 2020-09-11 DIAGNOSIS — R60.0 LOCALIZED EDEMA: ICD-10-CM

## 2020-09-11 PROCEDURE — 29581 APPL MULTLAYER CMPRN SYS LEG: CPT

## 2020-09-11 RX ORDER — DICLOXACILLIN SODIUM 250 MG/1
250 CAPSULE ORAL DAILY
Qty: 14 CAPSULE | Refills: 0 | Status: SHIPPED | OUTPATIENT
Start: 2020-09-11 | End: 2020-09-25

## 2020-09-11 RX ORDER — CIPROFLOXACIN 250 MG/1
250 TABLET, FILM COATED ORAL DAILY
Qty: 14 TABLET | Refills: 0 | Status: SHIPPED | OUTPATIENT
Start: 2020-09-11 | End: 2020-09-25

## 2020-09-11 NOTE — PROGRESS NOTES
Chief Complaint  This information was obtained from the patient  Patient is here for a wound care follow up.  Patients denies any pain on the wound    Allergies  NKDA    HPI  This information was obtained from the patient    9/11/20- Wounds on both legs c leg  NEW WOUND ON LEFT TOE - tendon sheath exposed. 2-14-20: WOund stable - left leg wound much much improved  right leg stable  no s/o infection  kerecis sample # 2 applied  2-7-20:  Wounds stable  Left leg wounds looking awesome - new epithelium +++  rig maybe infection again. Will culture today and switch to enluxtra. 9-20-19: Wound improved slowly - the wuonds on left leg are smaller a epithelial bridges are moving in. Wound on right leg same.   9-119: No acute changes - all wounds improving with epithe overall. Unclear why - differentials include worsening edema Vs increasing biofilm / bioburden / vs critical colonization  Will increase diuretics  Will culture wound.   Already patient is on oral lasix / k and daily preventive dose of bactrim    5-22-19: W prophylactic bactrim  2-15-19: Wounds all stable - seems improving- no s/o infection - no significant change in dimensions though. 2-9-19: Wounds are overall improving. No s/o infection.   pedal edema down  Wound areas from theraskin application last week mostly persistent since 2013. He says they started after joint replacement surgeries ( hip / knee).   based on the history that he is giving me, he as been diagnosed with venous reflux disease of lower extremtiies and has had several treatments for the daysi Weak.    Wound #7 Right, Medial Lower Leg is an acute Full Thickness Venous Ulcer and has received an outcome of Resolved. Subsequent wound encounter measurements are 0cm length x 0cm width with no measurable depth, with an area of 0 sq cm .  No tunneling h Measurement 12 cm from heel with right measurement of 24.8 cm          Assessment    Active Problems    ICD-10  (Encounter Diagnosis) S81.801D - Unspecified open wound, right lower leg, subsequent encounter  (Encounter Diagnosis) S81.802D - Unspecified ope 0          Entered By: Juan Carlos Brower on 09/11/2020 08:29:29  Signature(s):

## 2020-09-25 ENCOUNTER — OFFICE VISIT (OUTPATIENT)
Dept: WOUND CARE | Facility: HOSPITAL | Age: 75
End: 2020-09-25
Attending: INTERNAL MEDICINE
Payer: MEDICARE

## 2020-09-25 DIAGNOSIS — B95.61 METHICILLIN SUSCEPTIBLE STAPHYLOCOCCUS AUREUS INFECTION AS THE CAUSE OF DISEASES CLASSIFIED ELSEWHERE: ICD-10-CM

## 2020-09-25 DIAGNOSIS — S81.802D UNSPECIFIED OPEN WOUND, LEFT LOWER LEG, SUBSEQUENT ENCOUNTER: ICD-10-CM

## 2020-09-25 DIAGNOSIS — L97.919 CHRONIC VENOUS HYPERTENSION (IDIOPATHIC) WITH ULCER AND INFLAMMATION OF RIGHT LOWER EXTREMITY (HCC): ICD-10-CM

## 2020-09-25 DIAGNOSIS — I87.331 CHRONIC VENOUS HYPERTENSION (IDIOPATHIC) WITH ULCER AND INFLAMMATION OF RIGHT LOWER EXTREMITY (HCC): ICD-10-CM

## 2020-09-25 DIAGNOSIS — S81.801D UNSPECIFIED OPEN WOUND, RIGHT LOWER LEG, SUBSEQUENT ENCOUNTER: Primary | ICD-10-CM

## 2020-09-25 DIAGNOSIS — R60.0 LOCALIZED EDEMA: ICD-10-CM

## 2020-09-25 PROCEDURE — 29581 APPL MULTLAYER CMPRN SYS LEG: CPT

## 2020-09-25 RX ORDER — DICLOXACILLIN SODIUM 250 MG/1
250 CAPSULE ORAL DAILY
Qty: 14 CAPSULE | Refills: 0 | Status: SHIPPED | OUTPATIENT
Start: 2020-09-25 | End: 2020-12-15

## 2020-09-25 RX ORDER — CIPROFLOXACIN 250 MG/1
250 TABLET, FILM COATED ORAL DAILY
Qty: 14 TABLET | Refills: 0 | Status: SHIPPED | OUTPATIENT
Start: 2020-09-25 | End: 2020-10-09

## 2020-09-25 NOTE — PROGRESS NOTES
Chief Complaint  This information was obtained from the patient  Patient is here for a wound care follow up. Patient states that he is not having any pain.     Allergies  NKDA    HPI  This information was obtained from the patient    9/25/20- Left leg wou 2 week break to skin sub use and use highly absorptive dressing  2-21-20: Wound on left leg improving.  right leg wound dimensions same - but tissue quality better. kerecis # 3 today applied right leg  NEW WOUND ON LEFT TOE - tendon sheath exposed.   2-14- dressing change more often. 9-27-19: Wounds not much improved - tissue in the wound base is NOT good - scarred and fibrotic and wet at the same time. debrided well to bleeding tissue. Im worried there maybe infection again.   Will culture today and switch topical gentamycin - wounds appears  - debrided to bleeding tissue today - all wounds - continue gentamycin for now. consult plastics 6/25 appt.  5-31-19: Wounds on left leg has regressed - overall.  Unclear why - differentials include worsening sammy covered with theraskin stays intact. right medial and left medial leg wounds still with slough  -debrided - dressed with honey.   last labs --> cr=1.3- on lasix + K  no s/o infection - on low dose prophylactic bactrim  2-15-19: Wounds all stable - seems im doxycycline. 11-7-18:  WOUND CLINIC CONSULTATION INITIAL VISIT    76 YO CM  here for evaluation and management of bilateral lower extremity ulcers. He hs had these wounds since 2009 - on and off, but mostly persistent since 2013.   He says they started a skin exhibited: Edema, Dry/Scaly, Hemosiderosis. The temperature of the periwound skin is WNL. Periwound skin does not exhibit signs or symptoms of infection.  Local Pulse is Normal.    Vitals  Height/Length: 73 in (185.42 cm), Weight: 267 lbs (121.36 kgs), of Care at bedside with patient. The patient verbally acknowledges understanding of all instructions and all questions were answered. - 2 more weeks of low dose antibiotics called in  Wound improving. No s/s of infection.       Orders Placed This Encounter

## 2020-10-09 ENCOUNTER — OFFICE VISIT (OUTPATIENT)
Dept: WOUND CARE | Facility: HOSPITAL | Age: 75
End: 2020-10-09
Attending: INTERNAL MEDICINE
Payer: MEDICARE

## 2020-10-09 DIAGNOSIS — R60.0 LOCALIZED EDEMA: ICD-10-CM

## 2020-10-09 DIAGNOSIS — I87.333 CHRONIC VENOUS HYPERTENSION (IDIOPATHIC) WITH ULCER AND INFLAMMATION OF BILATERAL LOWER EXTREMITY (HCC): ICD-10-CM

## 2020-10-09 DIAGNOSIS — S81.801D UNSPECIFIED OPEN WOUND, RIGHT LOWER LEG, SUBSEQUENT ENCOUNTER: Primary | ICD-10-CM

## 2020-10-09 DIAGNOSIS — L97.919 CHRONIC VENOUS HYPERTENSION (IDIOPATHIC) WITH ULCER AND INFLAMMATION OF BILATERAL LOWER EXTREMITY (HCC): ICD-10-CM

## 2020-10-09 DIAGNOSIS — L97.929 CHRONIC VENOUS HYPERTENSION (IDIOPATHIC) WITH ULCER AND INFLAMMATION OF BILATERAL LOWER EXTREMITY (HCC): ICD-10-CM

## 2020-10-09 DIAGNOSIS — S81.802D UNSPECIFIED OPEN WOUND, LEFT LOWER LEG, SUBSEQUENT ENCOUNTER: ICD-10-CM

## 2020-10-09 DIAGNOSIS — L97.222 NON-PRESSURE CHRONIC ULCER OF LEFT CALF WITH FAT LAYER EXPOSED (HCC): ICD-10-CM

## 2020-10-09 DIAGNOSIS — L97.212 NON-PRESSURE CHRONIC ULCER OF RIGHT CALF WITH FAT LAYER EXPOSED (HCC): ICD-10-CM

## 2020-10-09 PROCEDURE — 99214 OFFICE O/P EST MOD 30 MIN: CPT

## 2020-10-09 NOTE — PROGRESS NOTES
Chief Complaint  This information was obtained from the patient  Patient is here for a wound care follow up. Patient states that he is not having any pain. States he took his dose of antibiotics this morning.     Allergies  NKDA    HPI  This information w pcp.  will resume kerecis. no s/o infection now.  2-28-20: Left leg wound looks awesome. right leg wound ok - but moisture related damage to skin suspected - possible due to  sorbact trapping down drainage.   give 2 week break to skin sub use and use highl Wounds stable overall - periwound not appearing wet or macerated anymore. Completed course of antibiotics. no s/o infection now. 10-4-19; Culture positive - started dicloxacillin. periwound wet - He needs to have dressing change more often.   [id number] Soriano and stable. Appt with plastics on Tuesday for possible SSG.  6-4-19: Wound stable - using gentamycin drops daily.  All wounds appearing clean.  6-7-19: Wounds stable - wound culture showed some staph aureus - using topical gentamycin - wounds appears cleane today. We started theraskin on the major wound on the right leg and on the small medial left ankle wound. overall he is doing good. no s/o infection - he will need refill on lasix and K.    2-22-19: Major wounds covered with theraskin stays intact.   rig visit - Significant thick slough / necrotic tissue on the larger wounds of left leg - leg circumference much bigger - seems like he is not tolerating Coflex 2. Need to r/o infeciton - he is on 7 day course of doxycycline.     11-7-18:  [facility] was no drainage noted. The patient reports a wound pain of level 0/10. The wound margin is well defined. Wound bed has % epithelialization. The temperature of the periwound skin is WNL. Periwound skin does not exhibit signs or symptoms of infection. JUXTALITE  Compression to Right Leg. Compression to Left Leg  Avoid prolonged standing in one place. Elevate leg(s)as much as possible. Take you diuretics as directed. Follow-Up Appointments:  Discharge from Outpatient Services.     Misc/Additional Or

## 2020-11-23 PROBLEM — N18.30 CKD (CHRONIC KIDNEY DISEASE) STAGE 3, GFR 30-59 ML/MIN (HCC): Status: ACTIVE | Noted: 2019-03-04

## 2020-11-23 PROBLEM — Z91.89 AT RISK FOR CARDIOVASCULAR EVENT: Status: ACTIVE | Noted: 2019-03-04

## 2020-11-23 PROBLEM — Z71.89 ADVANCE DIRECTIVE DISCUSSED WITH PATIENT: Status: ACTIVE | Noted: 2020-03-09

## 2020-11-23 PROBLEM — Z87.898 HISTORY OF PREDIABETES: Status: ACTIVE | Noted: 2017-07-13

## 2020-12-11 ENCOUNTER — OFFICE VISIT (OUTPATIENT)
Dept: WOUND CARE | Facility: HOSPITAL | Age: 75
End: 2020-12-11
Attending: INTERNAL MEDICINE
Payer: MEDICARE

## 2020-12-11 DIAGNOSIS — L97.212 NON-PRESSURE CHRONIC ULCER OF RIGHT CALF WITH FAT LAYER EXPOSED (HCC): ICD-10-CM

## 2020-12-11 DIAGNOSIS — I87.333 CHRONIC VENOUS HYPERTENSION (IDIOPATHIC) WITH ULCER AND INFLAMMATION OF BILATERAL LOWER EXTREMITY (HCC): ICD-10-CM

## 2020-12-11 DIAGNOSIS — L97.929 CHRONIC VENOUS HYPERTENSION (IDIOPATHIC) WITH ULCER AND INFLAMMATION OF BILATERAL LOWER EXTREMITY (HCC): ICD-10-CM

## 2020-12-11 DIAGNOSIS — R60.0 LOCALIZED EDEMA: ICD-10-CM

## 2020-12-11 DIAGNOSIS — L97.222 NON-PRESSURE CHRONIC ULCER OF LEFT CALF WITH FAT LAYER EXPOSED (HCC): ICD-10-CM

## 2020-12-11 DIAGNOSIS — L97.222 NON-PRESSURE CHRONIC ULCER OF CALF WITH FAT LAYER EXPOSED, LEFT (HCC): Primary | ICD-10-CM

## 2020-12-11 DIAGNOSIS — L97.919 CHRONIC VENOUS HYPERTENSION (IDIOPATHIC) WITH ULCER AND INFLAMMATION OF BILATERAL LOWER EXTREMITY (HCC): ICD-10-CM

## 2020-12-11 PROCEDURE — 87205 SMEAR GRAM STAIN: CPT

## 2020-12-11 PROCEDURE — 11042 DBRDMT SUBQ TIS 1ST 20SQCM/<: CPT

## 2020-12-11 PROCEDURE — 87070 CULTURE OTHR SPECIMN AEROBIC: CPT

## 2020-12-11 PROCEDURE — 87077 CULTURE AEROBIC IDENTIFY: CPT

## 2020-12-11 PROCEDURE — 99215 OFFICE O/P EST HI 40 MIN: CPT

## 2020-12-11 PROCEDURE — 87186 SC STD MICRODIL/AGAR DIL: CPT

## 2020-12-11 PROCEDURE — 29581 APPL MULTLAYER CMPRN SYS LEG: CPT

## 2020-12-11 NOTE — PROGRESS NOTES
Chief Complaint  This information was obtained from the patient  Patient is here for an initial exam for BLE. Patient continues to have [facility] nurse. Arrives wearing tubi  under velcro compression wraps.  Patient states he is taking Penicillin but cannot - almost ready to close  7-3-20: Wounds looking amazing. many of the small clusters have healed. no s/o infection. 6-19-20: Pt did not have any compression for almost one week due to lack of availability.  his legs are swollen - wounds are stable but left looks improved. right leg wounds not improved much. 11-1-19: Wounds stable - left lateral wound superficial with  epithelium +++  Right leg wound painful with some malodor and tissue looks v sloughy and spongy - cultured right leg wound.   10-18-19: Wound integrating. no s/o infeciton  7-19-19: Wounds all look stable - pedal edema is down - no s/o infection - no complaints. theraskin in place on medial left leg placed. theraskin applied on lateral left leg and right leg wound    7-12-19;  Wound on left leg improving - no s/o infection - edema down  3-22-19: Wound dimensions improving - Plan on grafting RLE  wound today as it is week 3. No s/o infection. 3-15-19: Dimensions improved significantly.  No s/o infection - leg swelling improved - Continues t be on in wound dimensions. 12-14-18: WOUNDS STABLE - BUT SIGNIFICANT PERIWOUND MACERATION AND SKIN BREAKDOWNS BOTH LEGS - UNCLEAR WHY  NO PURULENT DISCHARGE / NEW PAIN. 12-7-18: Legs swollen - wounds stable - one wound closed.     11-30-18: WOunds stable an History, Autoimmune Disease - No History    Social History  This information was obtained from the patient  Never smoker, Alcohol Use - none, Marital Status - , Occupation - retired, Cultural, Spiritism, or Language Concerns - none, Lives in - hous #17 Left, Lateral Lower Leg is a Full Thickness Venous Ulcer and has received a status of Not Healed. Initial wound encounter measurements are 4.5cm length x 1.5cm width x 0.1cm depth, with an area of 6.75 sq cm and a volume of 0.675 cubic cm.  No tunneling patient reports a wound pain of level 0/10. The wound margin is well defined. Wound bed has % pink, firm granulation. The periwound skin exhibited: Edema, Hemosiderosis. The temperature of the periwound skin is Warm.  Periwound skin does not exhibit No  Dependent Rubor: No  Hyperpigmentation: Yes  Lipodermatosclerosis: No    General Notes:  Length BLE 19.5 inches heel to back of knee        Assessment    Active Problems    ICD-10  (Encounter Diagnosis) P63.920 - Chronic venous hypertension (idiopathic prior to the start of the procedure. A minimal amount of bleeding was controlled with pressure. The procedure was tolerated well with a pain level of 0 throughout and a pain level of 0 following the procedure.  Post Debridement Measurements: 8.2cm length x future wound center visits during preparation for physician exam if wound bed contains fibrin or eschar. Wound Cleansing & Dressings:  May shower with protection.   Cleanse with saline or wound cleanser  Barrier ointment/paste/cream - periwound  Endoform

## 2020-12-18 ENCOUNTER — OFFICE VISIT (OUTPATIENT)
Dept: WOUND CARE | Facility: HOSPITAL | Age: 75
End: 2020-12-18
Attending: INTERNAL MEDICINE
Payer: MEDICARE

## 2020-12-18 DIAGNOSIS — L97.919 CHRONIC VENOUS HYPERTENSION (IDIOPATHIC) WITH ULCER AND INFLAMMATION OF BILATERAL LOWER EXTREMITY (HCC): ICD-10-CM

## 2020-12-18 DIAGNOSIS — L97.929 CHRONIC VENOUS HYPERTENSION (IDIOPATHIC) WITH ULCER AND INFLAMMATION OF BILATERAL LOWER EXTREMITY (HCC): ICD-10-CM

## 2020-12-18 DIAGNOSIS — R60.0 LOCALIZED EDEMA: ICD-10-CM

## 2020-12-18 DIAGNOSIS — L97.212 NON-PRESSURE CHRONIC ULCER OF RIGHT CALF WITH FAT LAYER EXPOSED (HCC): Primary | ICD-10-CM

## 2020-12-18 DIAGNOSIS — I87.333 CHRONIC VENOUS HYPERTENSION (IDIOPATHIC) WITH ULCER AND INFLAMMATION OF BILATERAL LOWER EXTREMITY (HCC): ICD-10-CM

## 2020-12-18 DIAGNOSIS — L97.222 NON-PRESSURE CHRONIC ULCER OF LEFT CALF WITH FAT LAYER EXPOSED (HCC): ICD-10-CM

## 2020-12-18 PROCEDURE — 99215 OFFICE O/P EST HI 40 MIN: CPT

## 2020-12-18 NOTE — PROGRESS NOTES
Chief Complaint  This information was obtained from the patient  Patient is here for a follow for BLE.  Patients denies any issues and Home health comes 3 times a week    Allergies  NKDA    HPI  This information was obtained from the patient    12/18/20- are full thickness with fibrotic base which were debrided. 7-17-20: Wound much improved - almost ready to close  7-3-20: Wounds looking amazing. many of the small clusters have healed. no s/o infection.   6-19-20: Pt did not have any compression for almost bactrim - completed. Left leg lateral wound almost fully closed. left leg medial wound looks improved. right leg wounds not improved much.   11-1-19: Wounds stable - left lateral wound superficial with  epithelium +++  Right leg wound painful with some m takes metolazone. check bmp next week.  8-2-19: Wound stable - theraskin in place - integrating. no s/o infeciton  7-19-19: Wounds all look stable - pedal edema is down - no s/o infection - no complaints. theraskin in place on medial left leg placed.   the infection  4-12-19: Wounds improved. theraskin applied on wound on RLE  4-1-19: Wound dimensions improving - no s/o infection - edema down  3-22-19: Wound dimensions improving - Plan on grafting RLE  wound today as it is week 3. No s/o infection.   3-15-19: will culture today    12-21-18: Wounds overall stable. Edema improved - But, not much improvement in wound dimensions.     12-14-18: WOUNDS STABLE - BUT SIGNIFICANT PERIWOUND MACERATION AND SKIN BREAKDOWNS BOTH LEGS - UNCLEAR WHY  NO PURULENT DISCHARGE / [facility])  Eyes  Ear/Nose/Mouth/Throat  Respiratory  Cardiovascular (Central/Peripheral)  Gastrointestinal (GI)  Genitourinary ()  Neurological    General Notes:  negative except HPI - denies fever / increased pain / periwound redness - denies chest pain / Notes: Length on an angle. wound measure with cluster    Wound #19 Right, Medial Ankle is a Full Thickness Venous Ulcer and has received a status of Not Healed.  Subsequent wound encounter measurements are 2cm length x 0.9cm width x 0.1cm depth, with an ar Pigmented  Hair Growth on Extremity: No  Temperature of Extremity: Warm  Capillary Refill: < 3 seconds  Erythema: No  Dependent Rubor: No  Hyperpigmentation: Yes  Lipodermatosclerosis: No          Assessment    Active Problems    ICD-10  (Encounter Diagnos ointment/paste/cream - periwound  ABD pad  Change Dressing Daily and/or PRN    Additional Orders:     Follow-Up Appointments:  Return appointment in 3 weeks    Misc/Additional Orders:  Supplement with a daily multivitamin  Increase protein into diet    Care

## 2021-01-08 ENCOUNTER — OFFICE VISIT (OUTPATIENT)
Dept: WOUND CARE | Facility: HOSPITAL | Age: 76
End: 2021-01-08
Attending: INTERNAL MEDICINE
Payer: MEDICARE

## 2021-01-08 DIAGNOSIS — L97.919 CHRONIC VENOUS HYPERTENSION (IDIOPATHIC) WITH ULCER AND INFLAMMATION OF BILATERAL LOWER EXTREMITY (HCC): Primary | ICD-10-CM

## 2021-01-08 DIAGNOSIS — I87.333 CHRONIC VENOUS HYPERTENSION (IDIOPATHIC) WITH ULCER AND INFLAMMATION OF BILATERAL LOWER EXTREMITY (HCC): Primary | ICD-10-CM

## 2021-01-08 DIAGNOSIS — L97.929 CHRONIC VENOUS HYPERTENSION (IDIOPATHIC) WITH ULCER AND INFLAMMATION OF BILATERAL LOWER EXTREMITY (HCC): Primary | ICD-10-CM

## 2021-01-08 DIAGNOSIS — L97.212 NON-PRESSURE CHRONIC ULCER OF RIGHT CALF WITH FAT LAYER EXPOSED (HCC): ICD-10-CM

## 2021-01-08 DIAGNOSIS — L97.222 NON-PRESSURE CHRONIC ULCER OF LEFT CALF WITH FAT LAYER EXPOSED (HCC): ICD-10-CM

## 2021-01-08 DIAGNOSIS — R60.0 LOCALIZED EDEMA: ICD-10-CM

## 2021-01-08 PROCEDURE — 11042 DBRDMT SUBQ TIS 1ST 20SQCM/<: CPT

## 2021-01-08 NOTE — PROGRESS NOTES
Chief Complaint  This information was obtained from the patient  Patient is here for a follow for BLE. Pt denies any pain or problems with the wounds.     Allergies  NKDA    HPI  This information was obtained from the patient    1/8/21- Wounds looking awe superficial open areas left. but there are 2 new small open areas which are full thickness with fibrotic base which were debrided. 7-17-20: Wound much improved - almost ready to close  7-3-20: Wounds looking amazing.  many of the small clusters have heale much as the others. 11-17-19:Wound culture positive - resumed full dose bactrim - completed. Left leg lateral wound almost fully closed. left leg medial wound looks improved. right leg wounds not improved much.   11-1-19: Wounds stable - left lateral wo right leg wound today. cr 1.5 ; k 3.4 - will add extra K to the days he takes metolazone. check bmp next week.  8-2-19: Wound stable - theraskin in place - integrating.  no s/o infeciton  7-19-19: Wounds all look stable - pedal edema is down - no s/o infec fibrotic base - cross hatched today with blade. no s/o infection  4-12-19: Wounds improved.  theraskin applied on wound on RLE  4-1-19: Wound dimensions improving - no s/o infection - edema down  3-22-19: Wound dimensions improving - Plan on grafting RLE staph aureus. 1-4-19: Wounds deteriorated. need labs - will culture today    12-21-18: Wounds overall stable. Edema improved - But, not much improvement in wound dimensions.     12-14-18: WOUNDS STABLE - BUT SIGNIFICANT PERIWOUND MACERATION AND SKIN BREAKD Symptoms (General Health)  Eyes  Ear/Nose/Mouth/Throat  Respiratory  Cardiovascular (Central/Peripheral)  Gastrointestinal (GI)  Genitourinary ()  Neurological    General Notes:  negative except HPI - denies fever / increased pain / periwound redness - d Medial Ankle is a Full Thickness Venous Ulcer and has received a status of Not Healed. Subsequent wound encounter measurements are 1.1cm length x 0.8cm width x 0.1cm depth, with an area of 0.88 sq cm and a volume of 0.088 cubic cm.  No tunneling has been no seconds  Erythema: No  Dependent Rubor: No  Hyperpigmentation: Yes  Lipodermatosclerosis: No          Assessment    Active Problems    ICD-10  (Encounter Diagnosis) I87.333 - Chronic venous hypertension (idiopathic) with ulcer and inflammation of bilateral wound bed contains fibrin or eschar. Wound Cleansing & Dressings:  May shower with protection.   Cleanse with saline or wound cleanser  Barrier ointment/paste/cream - periwound  Endoform Collagen  Hydrofera Transfer  Kerramax/Super absorbent  ABD pad  Ch

## 2021-01-22 ENCOUNTER — OFFICE VISIT (OUTPATIENT)
Dept: WOUND CARE | Facility: HOSPITAL | Age: 76
End: 2021-01-22
Attending: INTERNAL MEDICINE
Payer: MEDICARE

## 2021-01-22 DIAGNOSIS — L97.222 NON-PRESSURE CHRONIC ULCER OF LEFT CALF WITH FAT LAYER EXPOSED (HCC): ICD-10-CM

## 2021-01-22 DIAGNOSIS — I87.333 CHRONIC VENOUS HYPERTENSION (IDIOPATHIC) WITH ULCER AND INFLAMMATION OF BILATERAL LOWER EXTREMITY (HCC): Primary | ICD-10-CM

## 2021-01-22 DIAGNOSIS — L97.212 NON-PRESSURE CHRONIC ULCER OF RIGHT CALF WITH FAT LAYER EXPOSED (HCC): ICD-10-CM

## 2021-01-22 DIAGNOSIS — L97.929 CHRONIC VENOUS HYPERTENSION (IDIOPATHIC) WITH ULCER AND INFLAMMATION OF BILATERAL LOWER EXTREMITY (HCC): Primary | ICD-10-CM

## 2021-01-22 DIAGNOSIS — R60.0 LOCALIZED EDEMA: ICD-10-CM

## 2021-01-22 DIAGNOSIS — L97.919 CHRONIC VENOUS HYPERTENSION (IDIOPATHIC) WITH ULCER AND INFLAMMATION OF BILATERAL LOWER EXTREMITY (HCC): Primary | ICD-10-CM

## 2021-01-22 PROCEDURE — 15271 SKIN SUB GRAFT TRNK/ARM/LEG: CPT

## 2021-01-22 NOTE — PROGRESS NOTES
Chief Complaint  This information was obtained from the patient  Patient is here for a follow for BLE. Pt denies any pain or problems with the wounds.     Allergies  NKDA    HPI  This information was obtained from the patient    1/22/21- Wounds stable - s leg from last week stable - sloughy. no s/o infection. 7/31/20- Wounds almost all closed - some v superficial open areas left. but there are 2 new small open areas which are full thickness with fibrotic base which were debrided. 7-17-20:  Wound much imp closed. Medial wound with epithelial islands advancing. Right leg wound slightly improved - but not as much as the others. 11-17-19:Wound culture positive - resumed full dose bactrim - completed. Left leg lateral wound almost fully closed.   left leg med theraskin application left leg wounds - no c/o infection. 8-9-19: Wounds stable. theraskin aplpicaiton on right leg wound today. cr 1.5 ; k 3.4 - will add extra K to the days he takes metolazone.  check bmp next week.  8-2-19: Wound stable - theraskin in slid off - did not incorporate but wound improving. Some parts of left leg wounds still with significant fibrotic base - cross hatched today with blade. no s/o infection  4-12-19: Wounds improved.  theraskin applied on wound on RLE  4-1-19: Wound dimensio Jan 11th. [facility] reviewed - Cr - 1.2 which is OK.    1-11-19: Wounds much improved - culture positive for staph aureus. 1-4-19: Wounds deteriorated. need labs - will culture today    12-21-18: Wounds overall stable.  Edema improved - But, not much improvemen patient    Complaints and Symptoms  Patient denies complaints or symptoms related to:  Constitutional Symptoms (General Health)  Eyes  Ear/Nose/Mouth/Throat  Respiratory  Cardiovascular (Central/Peripheral)  Gastrointestinal (GI)  Genitourinary ()  Neuro exhibit signs or symptoms of infection. Local Pulse is Normal.    Wound #19 Right, Medial Ankle is a Full Thickness Venous Ulcer and has received a status of Not Healed.  Subsequent wound encounter measurements are 1.5cm length x 0.5cm width x 0.1cm depth, Extremity: No  Temperature of Extremity: Warm  Capillary Refill: < 3 seconds  Erythema: No  Dependent Rubor: No  Hyperpigmentation: Yes  Lipodermatosclerosis: No          Assessment    Active Problems    ICD-10  (Encounter Diagnosis) I87.333 - Chronic veno left, lateral lower leg. A Multi-Layer Compression Wrap procedure was performed. A Multi-Layer Coflex was applied with high 30-40 mmhg. The procedure was tolerated well. Wound #18  Wound #18 (Venous Ulcer) is located on the right, anterior lower leg.  A 3x/week    Compression Therapy:  Coflex 2 Layer    Additional Orders: Follow-Up Appointments:  Return Appointment in 1 week.     Misc/Additional Orders:  Supplement with a daily multivitamin  Increase protein into diet    Care summary  Discussed the Plan

## 2021-01-29 ENCOUNTER — OFFICE VISIT (OUTPATIENT)
Dept: WOUND CARE | Facility: HOSPITAL | Age: 76
End: 2021-01-29
Attending: INTERNAL MEDICINE
Payer: MEDICARE

## 2021-01-29 DIAGNOSIS — L97.919 CHRONIC VENOUS HYPERTENSION (IDIOPATHIC) WITH ULCER AND INFLAMMATION OF BILATERAL LOWER EXTREMITY (HCC): Primary | ICD-10-CM

## 2021-01-29 DIAGNOSIS — L97.212 NON-PRESSURE CHRONIC ULCER OF RIGHT CALF WITH FAT LAYER EXPOSED (HCC): ICD-10-CM

## 2021-01-29 DIAGNOSIS — I87.333 CHRONIC VENOUS HYPERTENSION (IDIOPATHIC) WITH ULCER AND INFLAMMATION OF BILATERAL LOWER EXTREMITY (HCC): Primary | ICD-10-CM

## 2021-01-29 DIAGNOSIS — L97.222 NON-PRESSURE CHRONIC ULCER OF LEFT CALF WITH FAT LAYER EXPOSED (HCC): ICD-10-CM

## 2021-01-29 DIAGNOSIS — R60.0 LOCALIZED EDEMA: ICD-10-CM

## 2021-01-29 DIAGNOSIS — L97.929 CHRONIC VENOUS HYPERTENSION (IDIOPATHIC) WITH ULCER AND INFLAMMATION OF BILATERAL LOWER EXTREMITY (HCC): Primary | ICD-10-CM

## 2021-01-29 PROCEDURE — 11042 DBRDMT SUBQ TIS 1ST 20SQCM/<: CPT

## 2021-01-29 NOTE — PROGRESS NOTES
Chief Complaint  This information was obtained from the patient  Patient is here for a follow for BLE. Pt denies any pain or problems with the wounds.     Allergies  NKDA    HPI  This information was obtained from the patient    1/29/21- Left leg lateral improved. not closed yet. it is lingering at that level.  hence re-culture done. 8/14/20 - All previous wounds almost fully closed up. the new wound on left leg from last week stable - sloughy. no s/o infection.   7/31/20- Wounds almost all closed - some second bridging almost complete.  right leg wound stalled - thick slough ++  no s/o infection. 12-6-19: Wound much improved - left leg lateral wound closed. Medial wound with epithelial islands advancing.   Right leg wound slightly improved - but not as m want to [facility] see plastics [facility] due [facility]. 8-23-19: WOunds look stable - theraskin in place. 8-16-19: Wounds improving - theraskin application left leg wounds - no c/o infection. 8-9-19: Wounds stable.  theraskin elizabeth on ri wounds on left leg medial - merged. no s/o infection. 4-26-19: Wound improving. No s/o infection. 4-19-19: Wounds improving. Right leg skin sub slid off - did not incorporate but wound improving.   Some parts of left leg wounds still with significant fib slightly up - repeat BMP pending - he need refills on lasix and K which were sent to [facility] through [employer]. Batrim was refilled for 30 days Jan 11th.   BMP reviewed - Cr - 1.2 which is OK.    1-11-19: Wounds much improved - culture positive for s clinic.    he is extremely compliant  with low carb low salt high protein diet.     Review of Systems (ROS)  This information was obtained from the patient    Complaints and Symptoms  Patient denies complaints or symptoms related to:  Constitutional Symptom 26-50% slough, 26-50% pink, firm granulation. The periwound skin exhibited: Edema, Dry/Scaly, Hemosiderosis. The temperature of the periwound skin is Warm. Periwound skin does not exhibit signs or symptoms of infection.  Local Pulse is Normal.    Wound #19 Rubor:  No  Hyperpigmentation: Yes  Lipodermatosclerosis: No  Right Extremity colors, hair growth, and conditions:  Extremity Color: Pigmented  Hair Growth on Extremity: No  Temperature of Extremity: Warm  Capillary Refill: < 3 seconds  Erythema: No  Depend Shelly Paul MD. Subcutaneous was removed along with devitalized tissue: biofilm, fibrin, and slough. The following instrument(s) were used: curette. Pain control was achieved using N/A.  A time out was conducted prior to the start of the procedure Transfer  Kerramax/Super absorbent  ABD pad  Change Dressing 3x/week - outer dressing only    Compression Therapy:  Coflex 2 Layer    Wound #18 Right, Anterior Lower Leg    Topicals:  Initial Anesthetic Order: Apply lidocaine to wound bed on all future wou

## 2021-02-05 ENCOUNTER — OFFICE VISIT (OUTPATIENT)
Dept: WOUND CARE | Facility: HOSPITAL | Age: 76
End: 2021-02-05
Attending: INTERNAL MEDICINE
Payer: MEDICARE

## 2021-02-05 DIAGNOSIS — I87.333 CHRONIC VENOUS HYPERTENSION (IDIOPATHIC) WITH ULCER AND INFLAMMATION OF BILATERAL LOWER EXTREMITY (HCC): Primary | ICD-10-CM

## 2021-02-05 DIAGNOSIS — R60.0 LOCALIZED EDEMA: ICD-10-CM

## 2021-02-05 DIAGNOSIS — L97.222 NON-PRESSURE CHRONIC ULCER OF LEFT CALF WITH FAT LAYER EXPOSED (HCC): ICD-10-CM

## 2021-02-05 DIAGNOSIS — L97.919 CHRONIC VENOUS HYPERTENSION (IDIOPATHIC) WITH ULCER AND INFLAMMATION OF BILATERAL LOWER EXTREMITY (HCC): Primary | ICD-10-CM

## 2021-02-05 DIAGNOSIS — L97.212 NON-PRESSURE CHRONIC ULCER OF RIGHT CALF WITH FAT LAYER EXPOSED (HCC): ICD-10-CM

## 2021-02-05 DIAGNOSIS — L97.929 CHRONIC VENOUS HYPERTENSION (IDIOPATHIC) WITH ULCER AND INFLAMMATION OF BILATERAL LOWER EXTREMITY (HCC): Primary | ICD-10-CM

## 2021-02-05 PROCEDURE — 15271 SKIN SUB GRAFT TRNK/ARM/LEG: CPT

## 2021-02-12 ENCOUNTER — OFFICE VISIT (OUTPATIENT)
Dept: WOUND CARE | Facility: HOSPITAL | Age: 76
End: 2021-02-12
Attending: INTERNAL MEDICINE
Payer: MEDICARE

## 2021-02-12 DIAGNOSIS — L97.222 NON-PRESSURE CHRONIC ULCER OF LEFT CALF WITH FAT LAYER EXPOSED (HCC): ICD-10-CM

## 2021-02-12 DIAGNOSIS — R60.0 LOCALIZED EDEMA: ICD-10-CM

## 2021-02-12 DIAGNOSIS — L97.929 CHRONIC VENOUS HYPERTENSION (IDIOPATHIC) WITH ULCER AND INFLAMMATION OF BILATERAL LOWER EXTREMITY (HCC): Primary | ICD-10-CM

## 2021-02-12 DIAGNOSIS — I87.333 CHRONIC VENOUS HYPERTENSION (IDIOPATHIC) WITH ULCER AND INFLAMMATION OF BILATERAL LOWER EXTREMITY (HCC): Primary | ICD-10-CM

## 2021-02-12 DIAGNOSIS — L97.212 NON-PRESSURE CHRONIC ULCER OF RIGHT CALF WITH FAT LAYER EXPOSED (HCC): ICD-10-CM

## 2021-02-12 DIAGNOSIS — L97.919 CHRONIC VENOUS HYPERTENSION (IDIOPATHIC) WITH ULCER AND INFLAMMATION OF BILATERAL LOWER EXTREMITY (HCC): Primary | ICD-10-CM

## 2021-02-12 PROCEDURE — 15271 SKIN SUB GRAFT TRNK/ARM/LEG: CPT

## 2021-02-12 NOTE — PROGRESS NOTES
Chief Complaint  This information was obtained from the patient  Patient is here for a follow for BLE. Pt states no new concerns or pain.     Allergies  NKDA    HPI  This information was obtained from the patient    2/12/21- Wound dimensions smaller - no dc bactrim permanently. current dressing - endoform / hf transfer / kerramax / coflex - 2    8/28/20- Wounds on right leg closed. Wounds on left leg superficial and improved. not closed yet. it is lingering at that level.  hence re-culture done.   8/14/20   1-10-20: Wound on left leg continues to improve with increased epithelial bridging. Right leg wound stable. no s/o infection. 12-27-19: Wound on left leg improved.   second bridging almost complete.  right leg wound stalled - thick slough ++  no stable  8-26-19: Wounds strable - improving - removed eliza on all - no s/o infection. Will stop skin sub treatments at this point as he had several applications already. he does nto want to go see plastics at [facility] setting due to the distance. improving overall. no s/o infection  considering SSG  5-8-19: Wound improving overall - epithelial islands moving in.  5-3-19: All wounds improving - Some wound dimensions are larger because 2 wounds on left leg medial - merged. no s/o infection.   4-26-19 application of theraskin today. 2-1-19: Wounds all look improved overall. Will start skin substitutes on the most granular region. No s/o infection.   1-18-19: Wounds getting better overall - Cr slightly up - repeat BMP pending - he need refills on lasix care treatment at [facility] wound centre over last several years. He has been in compression wraps for last 5 years.   He has had apligraf placement and several other  skin subs at his previous clinic.    he is extremely compliant  with low carb low salt No tunneling has been noted. No sinus tract has been noted. No undermining has been noted. There is a scant amount of sero-sanguineous drainage noted which has no odor. The patient reports a wound pain of level 0/10. The wound margin is well defined.  Wound Used Correctly: Yes  Compression Device Used: Multi-Layer Wrap  Calf Measurement 34 cm with right measurement of 35 cm  Ankle Measurement 12 cm with right measurement of 23.5 cm  Vascular Assessment:  Left Extremity colors, hair growth, and conditions:  Ex was performed using Kerecis by Tamia Brooks MD with an application area of 6 sq cm. The product was not fenestrated. 0 sq cm of product was wasted due to entire product used. 6 sq cm of product was utilized and was not secured.  Post application, a out was conducted prior to the start of the procedure. A minimal amount of bleeding was controlled with pressure. The procedure was tolerated well with a pain level of 0 throughout and a pain level of 0 following the procedure.  Post Debridement Measurement Dressing 3x/week    Compression Therapy:  Coflex 2 Layer    Additional Orders: Follow-Up Appointments:  Return Appointment in 1 week.     Misc/Additional Orders:  Supplement with a daily multivitamin  Increase protein into diet    Care summary  Discussed

## 2021-02-17 ENCOUNTER — OFFICE VISIT (OUTPATIENT)
Dept: WOUND CARE | Facility: HOSPITAL | Age: 76
End: 2021-02-17
Attending: INTERNAL MEDICINE
Payer: MEDICARE

## 2021-02-17 DIAGNOSIS — L97.919 CHRONIC VENOUS HYPERTENSION (IDIOPATHIC) WITH ULCER AND INFLAMMATION OF BILATERAL LOWER EXTREMITY (HCC): ICD-10-CM

## 2021-02-17 DIAGNOSIS — L97.222 NON-PRESSURE CHRONIC ULCER OF LEFT CALF WITH FAT LAYER EXPOSED (HCC): ICD-10-CM

## 2021-02-17 DIAGNOSIS — L97.929 CHRONIC VENOUS HYPERTENSION (IDIOPATHIC) WITH ULCER AND INFLAMMATION OF BILATERAL LOWER EXTREMITY (HCC): ICD-10-CM

## 2021-02-17 DIAGNOSIS — R60.0 LOCALIZED EDEMA: ICD-10-CM

## 2021-02-17 DIAGNOSIS — I87.333 CHRONIC VENOUS HYPERTENSION (IDIOPATHIC) WITH ULCER AND INFLAMMATION OF BILATERAL LOWER EXTREMITY (HCC): ICD-10-CM

## 2021-02-17 DIAGNOSIS — L97.212 NON-PRESSURE CHRONIC ULCER OF RIGHT CALF WITH FAT LAYER EXPOSED (HCC): Primary | ICD-10-CM

## 2021-02-17 PROCEDURE — 15271 SKIN SUB GRAFT TRNK/ARM/LEG: CPT

## 2021-02-17 NOTE — PROGRESS NOTES
Chief Complaint  This information was obtained from the patient  Patient is here for a follow for BLE. Denies any pain at this time, no issues or concerns.     Allergies  NKDA    HPI  This information was obtained from the patient    2/17/21 - Wound dimyasmin closure. 9/11/20- Wounds on both legs closed except for some superficial areas left open. culture positive - treated with cipro and dicloxacillin  will dc bactrim permanently.   current dressing - endoform / hf transfer / kerramax / coflex - 2    8/28/2 looking awesome - new epithelium +++  right leg wound stable - Kerecis sample # 1 today. 1-24-20: Wound on left leg so much improved  Right leg wound   1-10-20: Wound on left leg continues to improve with increased epithelial bridging.   Right leg w changes - all wounds improving with epithelium moving in. continues to have fibrotic base - debrided down to healthy tissue  9-6-19: Wounds stable  8-26-19: Wounds strable - improving - removed theraskin on all - no s/o infection.  Will stop skin sub treatm preventive dose of bactrim    5-22-19: Wound improved overall. Except for left medial area which has not improved much.  5-17-19: Wounds improving overall. no s/o infection  considering SSG  5-8-19: Wound improving overall - epithelial islands moving in. from theraskin application last week - were rinsed and cleaned and covered with new outer dressing. Several other wounds had first application of theraskin today. 2-1-19: Wounds all look improved overall.  Will start skin substitutes on the most granular had several treatments for the same - nothing more to do per his vein specialist.  he has had pretty much all available standard of care treatment at [facility] wound [place] over last several years. He has been in compression wraps for last 5 years.   He status of Bridged. Subsequent wound encounter measurements are 1.1cm length x 0.5cm width x 0.1cm depth, with an area of 0.55 sq cm and a volume of 0.055 cubic cm. No tunneling has been noted. No sinus tract has been noted. No undermining has been noted.  Giovanna Grace Measurement 12 cm from heel with left measurement of 24 cm  Right Extremity: Edema is present  Compression Device In Use: Yes  Device Used Correctly: Yes  Compression Device Used: Multi-Layer Wrap  Calf Measurement 34 cm from heel with right measurement of procedure. The procedure was tolerated well with a pain level of 0 throughout and a pain level of 0 following the procedure. Wound #17 (Venous Ulcer) is located on the left, lateral lower leg. A Multi-Layer Compression Wrap procedure was performed.  A 2 Orders:  Wound #17 Left, Lateral Lower Leg    Topicals:  Initial Anesthetic Order: Apply lidocaine to wound bed on all future wound center visits during preparation for physician exam if wound bed contains fibrin or eschar.     Wound Cleansing & Dressings: patient. The patient verbally acknowledges understanding of all instructions and all questions were answered.             Entered By: Esteban Lopes on 02/17/2021 09:51:42  Signature(s): Date(s):

## 2021-02-24 ENCOUNTER — OFFICE VISIT (OUTPATIENT)
Dept: WOUND CARE | Facility: HOSPITAL | Age: 76
End: 2021-02-24
Attending: INTERNAL MEDICINE
Payer: MEDICARE

## 2021-02-24 DIAGNOSIS — L97.929 CHRONIC VENOUS HYPERTENSION (IDIOPATHIC) WITH ULCER AND INFLAMMATION OF BILATERAL LOWER EXTREMITY (HCC): ICD-10-CM

## 2021-02-24 DIAGNOSIS — L97.222 NON-PRESSURE CHRONIC ULCER OF LEFT CALF WITH FAT LAYER EXPOSED (HCC): ICD-10-CM

## 2021-02-24 DIAGNOSIS — L97.212 NON-PRESSURE CHRONIC ULCER OF RIGHT CALF WITH FAT LAYER EXPOSED (HCC): Primary | ICD-10-CM

## 2021-02-24 DIAGNOSIS — L97.919 CHRONIC VENOUS HYPERTENSION (IDIOPATHIC) WITH ULCER AND INFLAMMATION OF BILATERAL LOWER EXTREMITY (HCC): ICD-10-CM

## 2021-02-24 DIAGNOSIS — R60.0 LOCALIZED EDEMA: ICD-10-CM

## 2021-02-24 DIAGNOSIS — I87.333 CHRONIC VENOUS HYPERTENSION (IDIOPATHIC) WITH ULCER AND INFLAMMATION OF BILATERAL LOWER EXTREMITY (HCC): ICD-10-CM

## 2021-02-24 PROCEDURE — 87070 CULTURE OTHR SPECIMN AEROBIC: CPT

## 2021-02-24 PROCEDURE — 87077 CULTURE AEROBIC IDENTIFY: CPT

## 2021-02-24 PROCEDURE — 87186 SC STD MICRODIL/AGAR DIL: CPT

## 2021-02-24 PROCEDURE — 97598 DBRDMT OPN WND ADDL 20CM/<: CPT

## 2021-02-24 PROCEDURE — 87205 SMEAR GRAM STAIN: CPT

## 2021-02-24 PROCEDURE — 97597 DBRDMT OPN WND 1ST 20 CM/<: CPT

## 2021-02-24 NOTE — PROGRESS NOTES
Chief Complaint  This information was obtained from the patient  Patient is here for a wound care follow up. He denies ant concerns or pain to the wounds.     Allergies  NKDA    HPI  This information was obtained from the patient    2/24/21- Wound stalled dose preventive antibiotics for 2 more weeks and re-wrap for 2 more weeks until full closure. 9/11/20- Wounds on both legs closed except for some superficial areas left open.   culture positive - treated with cipro and dicloxacillin  will dc bactrim perm s/o infection  kerecis sample # 2 applied  2-7-20: Wounds stable  Left leg wounds looking awesome - new epithelium +++  right leg wound stable - Kerecis sample # 1 today. 1-24-20: Wound on left leg so much improved  Right leg wound   1-10-20:  Wound smaller a epithelial bridges are moving in. Wound on right leg same.   9-119: No acute changes - all wounds improving with epithelium moving in. continues to have fibrotic base - debrided down to healthy tissue  9-6-19: Wounds stable  8-26-19: Wounds strabl increase diuretics  Will culture wound. Already patient is on oral lasix / k and daily preventive dose of bactrim    5-22-19: Wound improved overall.   Except for left medial area which has not improved much.  5-17-19: Wounds improving overall. no s/o infe though. 2-9-19: Wounds are overall improving. No s/o infection. pedal edema down  Wound areas from theraskin application last week - were rinsed and cleaned and covered with new outer dressing.   Several other wounds had first application of theraskin to that he is giving me, he as been diagnosed with venous reflux disease of lower extremtiies and has had several treatments for the same - nothing more to do per his vein specialist.  he has had pretty much all available standard of care treatment at [facility] Full Thickness Venous Ulcer and has received a status of Bridged. Subsequent wound encounter measurements are 1.4cm length x 0.6cm width x 0.1cm depth, with an area of 0.84 sq cm and a volume of 0.084 cubic cm. No tunneling has been noted.  No sinus tract h Measurement 34 cm from heel with left measurement of 35.9 cm  Ankle Measurement 12 cm from heel with left measurement of 25 cm  Right Extremity: Edema is present  Compression Device In Use: Yes  Device Used Correctly: Yes  Compression Device Used: Multi-La devitalized tissue: biofilm and slough. The following instrument(s) were used: curette. Pain control was achieved using N/A. A time out was not conducted prior to the start of the procedure. No bleeding occurred.  The procedure was tolerated well with a yeimy Apply lidocaine to wound bed on all future wound center visits during preparation for physician exam if wound bed contains fibrin or eschar. Wound Cleansing & Dressings:  May shower with protection.   Cleanse with saline or wound cleanser  Barrier ointme

## 2021-03-01 RX ORDER — SULFAMETHOXAZOLE AND TRIMETHOPRIM 800; 160 MG/1; MG/1
1 TABLET ORAL 2 TIMES DAILY
Qty: 20 TABLET | Refills: 0 | Status: SHIPPED | OUTPATIENT
Start: 2021-03-01 | End: 2021-03-11

## 2021-03-05 ENCOUNTER — OFFICE VISIT (OUTPATIENT)
Dept: WOUND CARE | Facility: HOSPITAL | Age: 76
End: 2021-03-05
Attending: INTERNAL MEDICINE
Payer: MEDICARE

## 2021-03-05 DIAGNOSIS — B95.61 METHICILLIN SUSCEPTIBLE STAPHYLOCOCCUS AUREUS INFECTION AS THE CAUSE OF DISEASES CLASSIFIED ELSEWHERE: ICD-10-CM

## 2021-03-05 DIAGNOSIS — L97.919 CHRONIC VENOUS HYPERTENSION (IDIOPATHIC) WITH ULCER AND INFLAMMATION OF BILATERAL LOWER EXTREMITY (HCC): ICD-10-CM

## 2021-03-05 DIAGNOSIS — L97.222 NON-PRESSURE CHRONIC ULCER OF LEFT CALF WITH FAT LAYER EXPOSED (HCC): ICD-10-CM

## 2021-03-05 DIAGNOSIS — L97.212 NON-PRESSURE CHRONIC ULCER OF RIGHT CALF WITH FAT LAYER EXPOSED (HCC): Primary | ICD-10-CM

## 2021-03-05 DIAGNOSIS — L97.929 CHRONIC VENOUS HYPERTENSION (IDIOPATHIC) WITH ULCER AND INFLAMMATION OF BILATERAL LOWER EXTREMITY (HCC): ICD-10-CM

## 2021-03-05 DIAGNOSIS — I87.333 CHRONIC VENOUS HYPERTENSION (IDIOPATHIC) WITH ULCER AND INFLAMMATION OF BILATERAL LOWER EXTREMITY (HCC): ICD-10-CM

## 2021-03-05 DIAGNOSIS — R60.0 LOCALIZED EDEMA: ICD-10-CM

## 2021-03-05 DIAGNOSIS — B96.5 PSEUDOMONAS (AERUGINOSA) (MALLEI) (PSEUDOMALLEI) AS THE CAUSE OF DISEASES CLASSIFIED ELSEWHERE: ICD-10-CM

## 2021-03-05 PROCEDURE — 97597 DBRDMT OPN WND 1ST 20 CM/<: CPT

## 2021-03-05 NOTE — PROGRESS NOTES
Chief Complaint  This information was obtained from the patient  Patient is here for a wound care follow up for bilateral leg wounds, denies any pain at this time, pt is still taking antibiotics, no issues or concerns regarding wounds or wraps, loves princess reconciled. 10/9/20 - All wounds closed - there are some superficial skin tears here and there, probably from wrap rubbing.   will cover those with barrier cream.    9/25/20- Left leg wound superficial - Due to low history of complex wounds - will contin dressing  2-21-20: Wound on left leg improving.  right leg wound dimensions same - but tissue quality better. kerecis # 3 today applied right leg  NEW WOUND ON LEFT TOE - tendon sheath exposed.   2-14-20: WOund stable - left leg wound much much improved  r improved - tissue in the wound base is NOT good - scarred and fibrotic and wet at the same time. debrided well to bleeding tissue. Im worried there maybe infection again. Will culture today and switch to enluxtra.   9-20-19: Wound improved slowly - the meenakshi to bleeding tissue today - all wounds - continue gentamycin for now. consult plastics 6/25 appt.  5-31-19: Wounds on left leg has regressed - overall.  Unclear why - differentials include worsening edema Vs increasing biofilm / bioburden / vs critical colo medial leg wounds still with slough  -debrided - dressed with honey.   last labs --> cr=1.3- on lasix + K  no s/o infection - on low dose prophylactic bactrim  2-15-19: Wounds all stable - seems improving- no s/o infection - no significant change in dimensi VISIT    74 YO CM  here for evaluation and management of bilateral lower extremity ulcers. He hs had these wounds since 2009 - on and off, but mostly persistent since 2013. He says they started after joint replacement surgeries ( hip / knee).   based on th Edema, Dry/Scaly, Hemosiderosis. The temperature of the periwound skin is Cool. Periwound skin does not exhibit signs or symptoms of infection.  Local Pulse is Normal.  General Notes:  Length measured at an angle    Wound #18 Right, Anterior Lower Leg is a Pressure: 142/69 mmHg.     Lower Extremity Assessment  Edema Assessment:  Left Extremity: Edema is present  Compression Device In Use: Yes  Device Used Correctly: Yes  Compression Device Used: Multi-Layer Wrap  Calf Measurement 34 cm from heel with left roro Debridement Measurements: 6.9cm length x 2.2cm width x 0.1cm depth; with an area of 15.18 sq cm and a volume of 1.518 cubic cm; Wound #17 (Venous Ulcer) is located on the left, lateral lower leg. A Multi-Layer Compression Wrap procedure was performed.  A Transfer  Kerramax/Super absorbent  ABD pad  Change Dressing 3x/week    Compression Therapy:  Coflex 2 Layer    Additional Orders: Follow-Up Appointments:  Return Appointment in 1 week.     Misc/Additional Orders:  Supplement with a daily multivitamin  I

## 2021-03-19 ENCOUNTER — OFFICE VISIT (OUTPATIENT)
Dept: WOUND CARE | Facility: HOSPITAL | Age: 76
End: 2021-03-19
Attending: INTERNAL MEDICINE
Payer: MEDICARE

## 2021-03-19 DIAGNOSIS — L97.919 CHRONIC VENOUS HYPERTENSION (IDIOPATHIC) WITH ULCER AND INFLAMMATION OF BILATERAL LOWER EXTREMITY (HCC): ICD-10-CM

## 2021-03-19 DIAGNOSIS — L97.929 CHRONIC VENOUS HYPERTENSION (IDIOPATHIC) WITH ULCER AND INFLAMMATION OF BILATERAL LOWER EXTREMITY (HCC): ICD-10-CM

## 2021-03-19 DIAGNOSIS — R60.0 LOCALIZED EDEMA: ICD-10-CM

## 2021-03-19 DIAGNOSIS — B95.61 METHICILLIN SUSCEPTIBLE STAPHYLOCOCCUS AUREUS INFECTION AS THE CAUSE OF DISEASES CLASSIFIED ELSEWHERE: ICD-10-CM

## 2021-03-19 DIAGNOSIS — L97.212 NON-PRESSURE CHRONIC ULCER OF RIGHT CALF WITH FAT LAYER EXPOSED (HCC): Primary | ICD-10-CM

## 2021-03-19 DIAGNOSIS — B96.5 PSEUDOMONAS (AERUGINOSA) (MALLEI) (PSEUDOMALLEI) AS THE CAUSE OF DISEASES CLASSIFIED ELSEWHERE: ICD-10-CM

## 2021-03-19 DIAGNOSIS — I87.333 CHRONIC VENOUS HYPERTENSION (IDIOPATHIC) WITH ULCER AND INFLAMMATION OF BILATERAL LOWER EXTREMITY (HCC): ICD-10-CM

## 2021-03-19 DIAGNOSIS — L97.222 NON-PRESSURE CHRONIC ULCER OF LEFT CALF WITH FAT LAYER EXPOSED (HCC): ICD-10-CM

## 2021-03-19 PROCEDURE — 11042 DBRDMT SUBQ TIS 1ST 20SQCM/<: CPT

## 2021-03-19 NOTE — PROGRESS NOTES
Chief Complaint  This information was obtained from the patient  Patient is here for a wound care follow up for bilateral leg wounds. Pt states no new concerns or pain.     Allergies  NKDA    HPI  This information was obtained from the patient    3/19/21- juxtalite. he is on pncn for dental procedure. PMH reviewed  meds reconciled. 10/9/20 - All wounds closed - there are some superficial skin tears here and there, probably from wrap rubbing.   will cover those with barrier cream.    9/25/20- Left leg wo 2 week break to skin sub use and use highly absorptive dressing  2-21-20: Wound on left leg improving.  right leg wound dimensions same - but tissue quality better. kerecis # 3 today applied right leg  NEW WOUND ON LEFT TOE - tendon sheath exposed.   2-14- dressing change more often. 9-27-19: Wounds not much improved - tissue in the wound base is NOT good - scarred and fibrotic and wet at the same time. debrided well to bleeding tissue. Im worried there maybe infection again.   Will culture today and switch topical gentamycin - wounds appears  - debrided to bleeding tissue today - all wounds - continue gentamycin for now. consult plastics 6/25 appt.  5-31-19: Wounds on left leg has regressed - overall.  Unclear why - differentials include worsening sammy covered with theraskin stays intact. right medial and left medial leg wounds still with slough  -debrided - dressed with honey.   last labs --> cr=1.3- on lasix + K  no s/o infection - on low dose prophylactic bactrim  2-15-19: Wounds all stable - seems im doxycycline. 11-7-18:  WOUND CLINIC CONSULTATION INITIAL VISIT    76 YO CM  here for evaluation and management of bilateral lower extremity ulcers. He hs had these wounds since 2009 - on and off, but mostly persistent since 2013.   He says they started a 51-75% pink, spongy granulation. The periwound skin exhibited: Edema, Dry/Scaly, Hemosiderosis. The temperature of the periwound skin is Cool. Periwound skin does not exhibit signs or symptoms of infection.  Local Pulse is Normal.  General Notes:  Length m Respiratory Rate: 16 breaths/min, Blood Pressure: 145/76 mmHg.     Lower Extremity Assessment  Edema Assessment:  Left Extremity: Edema is present  Compression Device In Use: Yes  Device Used Correctly: Yes  Compression Device Used: Multi-Layer Wrap  Calf M throughout and a pain level of 0 following the procedure. Post Debridement Measurements: 6.3cm length x 2cm width x 0.2cm depth; with an area of 12.6 sq cm and a volume of 2.52 cubic cm;     Wound #17 (Venous Ulcer) is located on the left, lateral lower leg cleanser  Barrier ointment/paste/cream  Endoform Collagen  Hydrofera Transfer  Kerramax/Super absorbent  ABD pad  Change Dressing 3x/week    Compression Therapy:  Coflex 2 Layer    Additional Orders:     Follow-Up Appointments:  Return Appointment in 2 week

## 2021-04-02 ENCOUNTER — OFFICE VISIT (OUTPATIENT)
Dept: WOUND CARE | Facility: HOSPITAL | Age: 76
End: 2021-04-02
Attending: INTERNAL MEDICINE
Payer: MEDICARE

## 2021-04-02 DIAGNOSIS — L97.222 NON-PRESSURE CHRONIC ULCER OF LEFT CALF WITH FAT LAYER EXPOSED (HCC): ICD-10-CM

## 2021-04-02 DIAGNOSIS — I87.333 CHRONIC VENOUS HYPERTENSION (IDIOPATHIC) WITH ULCER AND INFLAMMATION OF BILATERAL LOWER EXTREMITY (HCC): ICD-10-CM

## 2021-04-02 DIAGNOSIS — L97.929 CHRONIC VENOUS HYPERTENSION (IDIOPATHIC) WITH ULCER AND INFLAMMATION OF BILATERAL LOWER EXTREMITY (HCC): ICD-10-CM

## 2021-04-02 DIAGNOSIS — L97.212 NON-PRESSURE CHRONIC ULCER OF RIGHT CALF WITH FAT LAYER EXPOSED (HCC): Primary | ICD-10-CM

## 2021-04-02 DIAGNOSIS — R60.0 LOCALIZED EDEMA: ICD-10-CM

## 2021-04-02 DIAGNOSIS — L97.919 CHRONIC VENOUS HYPERTENSION (IDIOPATHIC) WITH ULCER AND INFLAMMATION OF BILATERAL LOWER EXTREMITY (HCC): ICD-10-CM

## 2021-04-02 PROCEDURE — 11042 DBRDMT SUBQ TIS 1ST 20SQCM/<: CPT

## 2021-04-02 PROCEDURE — 97597 DBRDMT OPN WND 1ST 20 CM/<: CPT

## 2021-04-02 NOTE — PROGRESS NOTES
Chief Complaint  This information was obtained from the patient  Patient is here for a wound care follow up for bilateral leg wounds. Pt states no new concerns or pain.     Allergies  NKDA    HPI  This information was obtained from the patient    4/2/21: currently using HF classic. compression - circaid juxtalite. he is on pncn for dental procedure. PM reviewed  meds reconciled. 10/9/20 - All wounds closed - there are some superficial skin tears here and there, probably from wrap rubbing.   will cove possible due to  sorbact trapping down drainage. give 2 week break to skin sub use and use highly absorptive dressing  2-21-20: Wound on left leg improving.  right leg wound dimensions same - but tissue quality better.   kerecis # 3 today applied right leg started dicloxacillin. periwound wet - He needs to have dressing change more often. 9-27-19: Wounds not much improved - tissue in the wound base is NOT good - scarred and fibrotic and wet at the same time. debrided well to bleeding tissue.   Im worried the stable - wound culture showed some staph aureus - using topical gentamycin - wounds appears  - debrided to bleeding tissue today - all wounds - continue gentamycin for now.   consult plastics 6/25 appt.  5-31-19: Wounds on left leg has regressed - ov need refill on lasix and K.    2-22-19: Major wounds covered with theraskin stays intact. right medial and left medial leg wounds still with slough  -debrided - dressed with honey.   last labs --> cr=1.3- on lasix + K  no s/o infection - on low dose prophy 2.  Need to r/o infeciton - he is on 7 day course of doxycycline. 11-7-18:  WOUND CLINIC CONSULTATION INITIAL VISIT    74 YO CM  here for evaluation and management of bilateral lower extremity ulcers.  He hs had these wounds since 2009 - on and off, but well defined. Wound bed has 1-25% epithelialization, 1-25% slough, 26-50% pink, spongy granulation. The periwound skin exhibited: Edema, Dry/Scaly, Hemosiderosis. The temperature of the periwound skin is Cool.  Periwound skin does not exhibit signs or symp Temperature: 97.7 °F (36.5 °C), Pulse: 54 bpm, Respiratory Rate: 16 breaths/min, Blood Pressure: 127/68 mmHg.     Lower Extremity Assessment  Edema Assessment:  Left Extremity: Edema is present  Compression Device In Use: Yes  Device Used Correctly: Yes  Co tolerated well with a pain level of 0 throughout and a pain level of 0 following the procedure. Post Debridement Measurements: 7.2cm length x 2cm width x 0.1cm depth; with an area of 14.4 sq cm and a volume of 1.44 cubic cm;     Wound #17 (Venous Ulcer) is Measurements: 1.5cm length x 0.5cm width x 0.1cm depth; with an area of 0.75 sq cm and a volume of 0.075 cubic cm;        Plan    Wound Orders:  Wound #17 Left, Lateral Lower Leg    Topicals:  Initial Anesthetic Order: Apply lidocaine to wound bed on all f continue bactrim lower dose for now.             Entered By: Stormy Marrufo on 04/02/2021 08:49:59  Signature(s): Date(s):

## 2021-04-16 ENCOUNTER — OFFICE VISIT (OUTPATIENT)
Dept: WOUND CARE | Facility: HOSPITAL | Age: 76
End: 2021-04-16
Attending: INTERNAL MEDICINE
Payer: MEDICARE

## 2021-04-16 DIAGNOSIS — I87.333 CHRONIC VENOUS HYPERTENSION (IDIOPATHIC) WITH ULCER AND INFLAMMATION OF BILATERAL LOWER EXTREMITY (HCC): ICD-10-CM

## 2021-04-16 DIAGNOSIS — B96.5 PSEUDOMONAS (AERUGINOSA) (MALLEI) (PSEUDOMALLEI) AS THE CAUSE OF DISEASES CLASSIFIED ELSEWHERE: ICD-10-CM

## 2021-04-16 DIAGNOSIS — L97.919 CHRONIC VENOUS HYPERTENSION (IDIOPATHIC) WITH ULCER AND INFLAMMATION OF BILATERAL LOWER EXTREMITY (HCC): ICD-10-CM

## 2021-04-16 DIAGNOSIS — L97.222 NON-PRESSURE CHRONIC ULCER OF LEFT CALF WITH FAT LAYER EXPOSED (HCC): ICD-10-CM

## 2021-04-16 DIAGNOSIS — R60.0 LOCALIZED EDEMA: ICD-10-CM

## 2021-04-16 DIAGNOSIS — B95.61 METHICILLIN SUSCEPTIBLE STAPHYLOCOCCUS AUREUS INFECTION AS THE CAUSE OF DISEASES CLASSIFIED ELSEWHERE: ICD-10-CM

## 2021-04-16 DIAGNOSIS — L97.212 NON-PRESSURE CHRONIC ULCER OF RIGHT CALF WITH FAT LAYER EXPOSED (HCC): Primary | ICD-10-CM

## 2021-04-16 DIAGNOSIS — L97.929 CHRONIC VENOUS HYPERTENSION (IDIOPATHIC) WITH ULCER AND INFLAMMATION OF BILATERAL LOWER EXTREMITY (HCC): ICD-10-CM

## 2021-04-16 PROCEDURE — 97597 DBRDMT OPN WND 1ST 20 CM/<: CPT

## 2021-04-16 NOTE — PROGRESS NOTES
Chief Complaint  This information was obtained from the patient  Patient is here for a wound care follow up for bilateral leg wounds. Pt states no new concerns or pain.     Allergies  NKDA    HPI  This information was obtained from the patient    4/16/21: were closed and he was previously discharged from the clinic  in oct 2020. He admits to wearing his compression garment and continues to be on lasix. bactrim was discontinued at the end of last supply. HHN currently using HF classic.   compression - circa preventive dose of abctrim today. labs reviewed from pcp.  will resume kerecis. no s/o infection now.  2-28-20: Left leg wound looks awesome.   right leg wound ok - but moisture related damage to skin suspected - possible due to  sorbact trapping down drain preventive low dose antibiotics and lasix  10-11-19: Wounds stable overall - periwound not appearing wet or macerated anymore. Completed course of antibiotics. no s/o infection now. 10-4-19; Culture positive - started dicloxacillin.  periwound wet - Dennis coopere with skin substitutes  6-21-19: Wound appears clean and stable. Appt with plastics on Tuesday for possible SSG.  6-4-19: Wound stable - using gentamycin drops daily.  All wounds appearing clean.  6-7-19: Wounds stable - wound culture showed some staph aureu well adherent - did not reapply on those wounds today. We started theraskin on the major wound on the right leg and on the small medial left ankle wound. overall he is doing good. no s/o infection - he will need refill on lasix and K.    2-22-19:  Major Wounds have deteriorated significantly since last visit - Significant thick slough / necrotic tissue on the larger wounds of left leg - leg circumference much bigger - seems like he is not tolerating Coflex 2.   Need to r/o infeciton - he is on 7 day course sinus tract has been noted. No undermining has been noted. There is a small amount of sero-sanguineous drainage noted which has no odor. The patient reports a wound pain of level 0/10. The wound margin is well defined.  Wound bed has 1-25% epithelialization Periwound skin does not exhibit signs or symptoms of infection.  Local Pulse is Normal.    Vitals  Height/Length: 74 in (187.96 cm), Weight: 265 lbs (120.45 kgs), BMI: 34, Temperature: 97.7 °F (36.5 °C), Pulse: 55 bpm, Respiratory Rate: 16 breaths/min, Bloo the procedure. A minimal amount of bleeding was controlled with pressure. The procedure was tolerated well with a pain level of 0 throughout and a pain level of 0 following the procedure.  Post Debridement Measurements: 6.6cm length x 1.5cm width x 0.1cm de Therapy:  Coflex 2 Layer    Wound #18 Right, Anterior Lower Leg    Topicals:  Initial Anesthetic Order: Apply lidocaine to wound bed on all future wound center visits during preparation for physician exam if wound bed contains fibrin or eschar.     Wound Cl

## 2021-04-30 ENCOUNTER — OFFICE VISIT (OUTPATIENT)
Dept: WOUND CARE | Facility: HOSPITAL | Age: 76
End: 2021-04-30
Attending: INTERNAL MEDICINE
Payer: MEDICARE

## 2021-04-30 DIAGNOSIS — L97.212 NON-PRESSURE CHRONIC ULCER OF RIGHT CALF WITH FAT LAYER EXPOSED (HCC): Primary | ICD-10-CM

## 2021-04-30 DIAGNOSIS — B95.61 METHICILLIN SUSCEPTIBLE STAPHYLOCOCCUS AUREUS INFECTION AS THE CAUSE OF DISEASES CLASSIFIED ELSEWHERE: ICD-10-CM

## 2021-04-30 DIAGNOSIS — L97.929 CHRONIC VENOUS HYPERTENSION (IDIOPATHIC) WITH ULCER AND INFLAMMATION OF BILATERAL LOWER EXTREMITY (HCC): ICD-10-CM

## 2021-04-30 DIAGNOSIS — L97.919 CHRONIC VENOUS HYPERTENSION (IDIOPATHIC) WITH ULCER AND INFLAMMATION OF BILATERAL LOWER EXTREMITY (HCC): ICD-10-CM

## 2021-04-30 DIAGNOSIS — L97.222 NON-PRESSURE CHRONIC ULCER OF LEFT CALF WITH FAT LAYER EXPOSED (HCC): ICD-10-CM

## 2021-04-30 DIAGNOSIS — I87.333 CHRONIC VENOUS HYPERTENSION (IDIOPATHIC) WITH ULCER AND INFLAMMATION OF BILATERAL LOWER EXTREMITY (HCC): ICD-10-CM

## 2021-04-30 DIAGNOSIS — R60.0 LOCALIZED EDEMA: ICD-10-CM

## 2021-04-30 DIAGNOSIS — B96.5 PSEUDOMONAS (AERUGINOSA) (MALLEI) (PSEUDOMALLEI) AS THE CAUSE OF DISEASES CLASSIFIED ELSEWHERE: ICD-10-CM

## 2021-04-30 PROCEDURE — 29581 APPL MULTLAYER CMPRN SYS LEG: CPT

## 2021-04-30 NOTE — PROGRESS NOTES
Chief Complaint  This information was obtained from the patient  Patient is here for a wound care follow up. He denies any pain or new wound concerns.     Allergies  NKDA    HPI  This information was obtained from the patient    4/30/21 - Wounds improved- b/l LE wounds which has been open for weeks. largest wound is on left leg. All his wounds were closed and he was previously discharged from the clinic  in oct 2020. He admits to wearing his compression garment and continues to be on lasix.   bactrim was dis infection. 3-13-20: Wound culture was positive - treated with dicloxacillin for 2 weeks - resumed preventive dose of abctrim today. labs reviewed from pcp.  will resume kerecis. no s/o infection now.  2-28-20: Left leg wound looks awesome.   right leg woun consult for excisional debridement in OR + SSG. He does not want to drive that far.  still on preventive low dose antibiotics and lasix  10-11-19: Wounds stable overall - periwound not appearing wet or macerated anymore. Completed course of antibiotics.  n wound  6-28-19; Wounds stable and clean - Plastics consult appreciated. Patients prefers to continue with skin substitutes  6-21-19: Wound appears clean and stable.  Appt with plastics on Tuesday for possible SSG.  6-4-19: Wound stable - using gentamycin  improved - left leg wounds where we had applied theraskin looks v good  -looks like graft is taking - well adherent - did not reapply on those wounds today. We started theraskin on the major wound on the right leg and on the small medial left ankle wound. not like iodosorb - it burned. Leg measurements much better - wounds looks improved    11-16-18:  Wounds have deteriorated significantly since last visit - Significant thick slough / necrotic tissue on the larger wounds of left leg - leg circumference much 0.1cm depth, with an area of 3 sq cm and a volume of 0.3 cubic cm. No tunneling has been noted. No sinus tract has been noted. No undermining has been noted. There is a small amount of sero-sanguineous drainage noted which has no odor.  The patient reports of infection. Local Pulse is Normal.    Vitals  Height/Length: 74 in (187.96 cm), Weight: 265 lbs (120.45 kgs), BMI: 34, Temperature: 97.9 °F (36.61 °C), Pulse: 64 bpm, Respiratory Rate: 16 breaths/min, Blood Pressure: 135/74 mmHg.     Lower Extremity Asses procedure was tolerated well.         Plan    Wound Orders:  Wound #17 Left, Lateral Lower Leg    Topicals:  Initial Anesthetic Order: Apply lidocaine to wound bed on all future wound center visits during preparation for physician exam if wound bed contains 04/30/2021 08:50:43

## 2021-05-14 ENCOUNTER — OFFICE VISIT (OUTPATIENT)
Dept: WOUND CARE | Facility: HOSPITAL | Age: 76
End: 2021-05-14
Attending: INTERNAL MEDICINE
Payer: MEDICARE

## 2021-05-14 VITALS
HEIGHT: 74 IN | BODY MASS INDEX: 34.01 KG/M2 | WEIGHT: 265 LBS | TEMPERATURE: 98 F | RESPIRATION RATE: 16 BRPM | SYSTOLIC BLOOD PRESSURE: 118 MMHG | HEART RATE: 54 BPM | DIASTOLIC BLOOD PRESSURE: 77 MMHG

## 2021-05-14 DIAGNOSIS — L97.222 NON-PRESSURE CHRONIC ULCER OF LEFT CALF WITH FAT LAYER EXPOSED (HCC): ICD-10-CM

## 2021-05-14 DIAGNOSIS — L97.929 CHRONIC VENOUS HYPERTENSION (IDIOPATHIC) WITH ULCER AND INFLAMMATION OF BILATERAL LOWER EXTREMITY (HCC): ICD-10-CM

## 2021-05-14 DIAGNOSIS — I87.333 CHRONIC VENOUS HYPERTENSION (IDIOPATHIC) WITH ULCER AND INFLAMMATION OF BILATERAL LOWER EXTREMITY (HCC): ICD-10-CM

## 2021-05-14 DIAGNOSIS — L97.212 NON-PRESSURE CHRONIC ULCER OF RIGHT CALF WITH FAT LAYER EXPOSED (HCC): Primary | ICD-10-CM

## 2021-05-14 DIAGNOSIS — B95.61 METHICILLIN SUSCEPTIBLE STAPHYLOCOCCUS AUREUS INFECTION AS THE CAUSE OF DISEASES CLASSIFIED ELSEWHERE: ICD-10-CM

## 2021-05-14 DIAGNOSIS — L97.919 CHRONIC VENOUS HYPERTENSION (IDIOPATHIC) WITH ULCER AND INFLAMMATION OF BILATERAL LOWER EXTREMITY (HCC): ICD-10-CM

## 2021-05-14 PROCEDURE — 99215 OFFICE O/P EST HI 40 MIN: CPT

## 2021-05-14 PROCEDURE — 29581 APPL MULTLAYER CMPRN SYS LEG: CPT

## 2021-05-14 RX ORDER — GABAPENTIN 300 MG/1
300 CAPSULE ORAL
COMMUNITY
Start: 2021-03-15

## 2021-05-14 RX ORDER — PRAVASTATIN SODIUM 10 MG
10 TABLET ORAL DAILY
COMMUNITY
Start: 2021-04-29

## 2021-05-14 RX ORDER — SULFAMETHOXAZOLE AND TRIMETHOPRIM 400; 80 MG/1; MG/1
400 TABLET ORAL DAILY
COMMUNITY
Start: 2021-05-11 | End: 2021-06-11

## 2021-05-14 RX ORDER — OMEPRAZOLE 20 MG/1
20 CAPSULE, DELAYED RELEASE ORAL DAILY
COMMUNITY
Start: 2021-04-06

## 2021-05-14 RX ORDER — FUROSEMIDE 40 MG/1
40 TABLET ORAL 2 TIMES DAILY
COMMUNITY
Start: 2021-03-15

## 2021-05-14 RX ORDER — FLUTICASONE PROPIONATE 50 MCG
50 SPRAY, SUSPENSION (ML) NASAL DAILY
COMMUNITY
Start: 2021-03-15 | End: 2021-10-29

## 2021-05-14 RX ORDER — POTASSIUM CHLORIDE 750 MG/1
10 TABLET, FILM COATED, EXTENDED RELEASE ORAL 2 TIMES DAILY
COMMUNITY
Start: 2021-03-15

## 2021-05-14 RX ORDER — FLUTICASONE FUROATE, UMECLIDINIUM BROMIDE AND VILANTEROL TRIFENATATE 200; 62.5; 25 UG/1; UG/1; UG/1
1 POWDER RESPIRATORY (INHALATION) EVERY MORNING
COMMUNITY

## 2021-05-14 NOTE — PROGRESS NOTES
Weekly Wound Education Note    Teaching Provided To: Patient                    Training Topics: Dressing; Compression;Edema control;Cleasing and general instructions; Discharge instructions        Notes: Wounds improving, continue POC

## 2021-05-14 NOTE — PROGRESS NOTES
[facility] NOTE  Peter Garcia MD  5/14/2021    Chief Complaint:   Patient presents with: Follow - Up: Pt is here for a wound care follow up for bilateral lower leg wounds, denies any pain at this time.        HPI:   Rodrick Mohs TAKE 1 TABLET(10 MEQ) BY MOUTH TWICE DAILY (Patient not taking: Reported on 5/14/2021) 180 tablet 0   • POTASSIUM CHLORIDE ER 10 MEQ Oral Tab CR TAKE 1 TABLET(10 MEQ) BY MOUTH TWICE DAILY (Patient not taking: Reported on 5/14/2021) 180 tablet 0   • POTASSI (cm^3) 0 cm^3 05/14/21 1008   Drainage Amount None 05/14/21 1008   Wound Bed Epithelium (%) 100 % 05/14/21 1008   Margins Other (Comment) 05/14/21 1008   Non-staged Wound Description Full thickness 05/14/21 1008       Wound 12/11/20 #19 Right Medial Leg Ve Other emphysema ([address]     Presbyopia     Myopia     Malignant neoplasm of prostate ([address]     Ulcer of lower extremity ([address]     Seasonal allergic rhinitis due to pollen     Senile nuclear sclerosis     Routine medical exam     Regular astigmatism     Prima provider for clarification.     Nereida Villarreal MD  5/14/2021  10:49 AM

## 2021-05-28 ENCOUNTER — OFFICE VISIT (OUTPATIENT)
Dept: WOUND CARE | Facility: HOSPITAL | Age: 76
End: 2021-05-28
Attending: INTERNAL MEDICINE
Payer: MEDICARE

## 2021-05-28 VITALS
TEMPERATURE: 97 F | HEART RATE: 55 BPM | DIASTOLIC BLOOD PRESSURE: 62 MMHG | RESPIRATION RATE: 16 BRPM | SYSTOLIC BLOOD PRESSURE: 139 MMHG

## 2021-05-28 DIAGNOSIS — R60.0 LOCALIZED EDEMA: ICD-10-CM

## 2021-05-28 DIAGNOSIS — I87.333 CHRONIC VENOUS HYPERTENSION (IDIOPATHIC) WITH ULCER AND INFLAMMATION OF BILATERAL LOWER EXTREMITY (HCC): ICD-10-CM

## 2021-05-28 DIAGNOSIS — L97.919 CHRONIC VENOUS HYPERTENSION (IDIOPATHIC) WITH ULCER AND INFLAMMATION OF BILATERAL LOWER EXTREMITY (HCC): ICD-10-CM

## 2021-05-28 DIAGNOSIS — L97.212 NON-PRESSURE CHRONIC ULCER OF RIGHT CALF WITH FAT LAYER EXPOSED (HCC): Primary | ICD-10-CM

## 2021-05-28 DIAGNOSIS — I89.0 LYMPHEDEMA: ICD-10-CM

## 2021-05-28 DIAGNOSIS — L97.222 NON-PRESSURE CHRONIC ULCER OF LEFT CALF WITH FAT LAYER EXPOSED (HCC): ICD-10-CM

## 2021-05-28 DIAGNOSIS — L97.929 CHRONIC VENOUS HYPERTENSION (IDIOPATHIC) WITH ULCER AND INFLAMMATION OF BILATERAL LOWER EXTREMITY (HCC): ICD-10-CM

## 2021-05-28 PROCEDURE — 29581 APPL MULTLAYER CMPRN SYS LEG: CPT

## 2021-05-28 PROCEDURE — 99214 OFFICE O/P EST MOD 30 MIN: CPT

## 2021-05-28 NOTE — PROGRESS NOTES
Weekly Wound Education Note    Teaching Provided To: Patient  Training Topics: Discharge instructions;Cleasing and general instructions; Compression;Dressing              Notes: wound stable, continue POC.

## 2021-05-28 NOTE — PROGRESS NOTES
[facility] NOTE  Tanmay Ortiz MD  5/28/2021    Chief Complaint:   Patient presents with:  Wound Care: Patient is here for a wound care follow up.       HPI:   Hoang Jane is a 67year old male coming in for a follow-up MEQ) BY MOUTH TWICE DAILY (Patient not taking: Reported on 5/14/2021) 60 tablet 0   • Albuterol Sulfate HFA (VENTOLIN HFA) 108 (90 Base) MCG/ACT Inhalation Aero Soln Inhale 1-2 puffs into the lungs.      • Montelukast Sodium 10 MG Oral Tab TK 1 T PO D  1 Description Yellow;Serous 05/28/21 0911   Treatments Cleansed; Compression 05/14/21 1011   Dressing Other 05/14/21 1011   Abigail-wound Assessment Hyperpigmented;Dry;Edema 05/14/21 1011   Wound Odor None 05/28/21 0911   Wound Length (cm) 0.9 cm 05/28/21 0911 Plan  Orders  No orders of the defined types were placed in this encounter. 1. Cleanse with saline  2. Zinc - periwound  3. Apply endofrom collagen  4. HF transfer - secondary dressing. 5. abd pad   6. Compression wraps - coflex 2 layer.    7. Dilcia Cone

## 2021-05-28 NOTE — PATIENT INSTRUCTIONS
1. Cleanse with saline  2. Zinc - periwound  3. Apply endofrom collagen  4. HF transfer - secondary dressing. 5. abd pad   6. Compression wraps - coflex 2 layer. 7. Dressing change  3 x week. 8. RTC 2 weeks. [address] [facility]  EMERGENCY DEPARTMENT HISTORY AND PHYSICAL EXAM       Date: 5/22/2019   Patient Name: Flakita Ruiz   YOB: 1976  Medical Record Number: [id number]    HISTORY OF PRESENTING ILLNESS:     Flakita Ruiz is a 36 y.o. female presenting with the noted PMH to the ED presents by EMS for suspected overdose. Patient's boyfriend said she came back into the room and all of a sudden tensed up, her pupils were very small and he described agonal respirations. EMS was called and symptoms significantly improved after Narcan was given. Patient states she thought the methadone liquid in the kitchen was Robitussin. She does not remember drinking it only going to sleep last night. She denies any SI HI. Reports a history of high cholesterol and hypertension. Primary Care Provider: None   Specialist:    Past Medical History:   Past Medical History:   Diagnosis Date    Hypertension         Past Surgical History:   History reviewed. No pertinent surgical history. Social History:   Social History     Tobacco Use    Smoking status: Current Every Day Smoker     Packs/day: 0.25    Smokeless tobacco: Never Used   Substance Use Topics    Alcohol use: Yes     Alcohol/week: 1.8 oz     Types: 3 Glasses of wine per week    Drug use: No        Allergies: Allergies   Allergen Reactions    Lisinopril Nausea and Vomiting        REVIEW OF SYSTEMS:  Review of Systems   Constitutional: Positive for fatigue. Negative for chills and fever. HENT: Negative for ear pain and sore throat. Eyes: Negative for pain and visual disturbance. Respiratory: Negative for cough and shortness of breath. Cardiovascular: Negative for chest pain and palpitations. Gastrointestinal: Positive for nausea and vomiting. Negative for abdominal pain and diarrhea. Genitourinary: Negative for flank pain. Musculoskeletal: Negative for back pain and neck pain.    Neurological: Negative for syncope and headaches. Psychiatric/Behavioral: Negative for agitation. The patient is not nervous/anxious. PHYSICAL EXAM:  Vitals:    05/22/19 0720 05/22/19 0800 05/22/19 0830 05/22/19 1045   BP: (!) 153/104 139/88 (!) 139/97    Pulse: (!) 115 (!) 112 (!) 102 96   Resp: 13 13 13    Temp:       SpO2: 97% 96% 96% 99%   Weight:           Physical Exam   Constitutional: She is oriented to person, place, and time. She appears well-developed and well-nourished. She appears distressed (vomiting ). HENT:   Head: Normocephalic and atraumatic. Mouth/Throat: Oropharynx is clear and moist.   Eyes: Pupils are equal, round, and reactive to light. Conjunctivae and EOM are normal. No scleral icterus. constricted pupils   Neck: Normal range of motion. Neck supple. No tracheal deviation present. Cardiovascular: Regular rhythm and intact distal pulses. Tachycardia present. No murmur heard. Pulmonary/Chest: Effort normal and breath sounds normal. No respiratory distress. She has no wheezes. She has no rales. Abdominal: Soft. She exhibits no distension. There is no tenderness. There is no rebound and no guarding. Musculoskeletal: Normal range of motion. She exhibits no edema or deformity. Neurological: She is alert and oriented to person, place, and time. No cranial nerve deficit. No gross neuro deficit   Skin: Skin is warm and dry. No rash noted. She is not diaphoretic. Psychiatric: She has a normal mood and affect. Nursing note and vitals reviewed.          Medications   ondansetron (ZOFRAN) injection 4 mg (4 mg IntraVENous Given 5/22/19 3915)   sodium chloride 0.9 % bolus infusion 1,000 mL (0 mL IntraVENous IV Completed 5/22/19 7965)   metoclopramide HCl (REGLAN) injection 10 mg (10 mg IntraVENous Given 5/22/19 6114)       RESULTS:    Labs -   Labs Reviewed   METABOLIC PANEL, BASIC - Abnormal; Notable for the following components:       Result Value    Potassium 2.8 (*)     CO2 19 (*)     Glucose 197 (*)     BUN 6 (*)     BUN/Creatinine ratio 8 (*)     Calcium 7.7 (*)     All other components within normal limits   CBC WITH AUTOMATED DIFF - Abnormal; Notable for the following components:    RBC 3.68 (*)     .5 (*)     MCH 35.9 (*)     RDW 14.7 (*)     All other components within normal limits   ETHYL ALCOHOL - Abnormal; Notable for the following components:    ALCOHOL(ETHYL),SERUM 213 (*)     All other components within normal limits   DRUG SCREEN, URINE - Abnormal; Notable for the following components:    METHADONE POSITIVE (*)     All other components within normal limits   METABOLIC PANEL, BASIC - Abnormal; Notable for the following components:    Potassium 3.2 (*)     Chloride 110 (*)     Glucose 122 (*)     BUN 5 (*)     BUN/Creatinine ratio 8 (*)     Calcium 7.3 (*)     All other components within normal limits       Radiologic Studies -  CT HEAD WO CONT   Final Result         1. Mildly motion limited examination without evidence of acute intracranial   abnormality. MEDICAL DECISION MAKING    ED Course as of May 23 1005   Wed May 22, 2019   0822 Potassium 2.8, making me concerned about possible seizure activity as pt was tachycardic on arrival with reported posturing/AMSby EMS on their arrival.  Patient has no prior history of seizures will obtain CT head. [KG]   1016 Tachycardia is resolved. Patient is resting comfortably, BMP in process    [KG]   1042 Potassium improved, likely seizure related. Seizure threshold likely reduced from alcohol and methadone use will suggest outpatient neurology follow-up. [KG]      ED Course User Index  [KG] Steffany Melgar, DO   EtOH in the 200s, methadone positive. Patient was monitored for several hours in the emergency department and required no additional Narcan. She had no complaints on reevaluation. Patient has no new complaints, changes, or physical findings. Results were reviewed with the patient. Pt's questions and concerns were addressed.  Care plan was outlined, including follow-up with PCP/specialist and return precautions were discussed. Patient is felt to be stable for discharge at this time. Diagnosis   Clinical Impression:   1. Accidental methadone overdose, initial encounter ([address]    2. Seizure ([facility]    3. Alcoholic intoxication with complication ([facility]    4.  Hypokalemia           Follow-up Information     Follow up With Specialties Details Why [address] [facility] EMERGENCY DEPT Emergency Medicine  If symptoms worsen [address]  [phone]    [facility]  Schedule an appointment as soon as possible for a visit Please follow-up with a neurologist as it appears you may have had a first-time seizure today [address]          Discharge Medication List as of 5/22/2019 11:04 AM          _______________________________   Attestations:

## 2021-06-11 ENCOUNTER — OFFICE VISIT (OUTPATIENT)
Dept: WOUND CARE | Facility: HOSPITAL | Age: 76
End: 2021-06-11
Attending: INTERNAL MEDICINE
Payer: MEDICARE

## 2021-06-11 VITALS
SYSTOLIC BLOOD PRESSURE: 87 MMHG | RESPIRATION RATE: 16 BRPM | DIASTOLIC BLOOD PRESSURE: 58 MMHG | HEART RATE: 69 BPM | TEMPERATURE: 98 F

## 2021-06-11 DIAGNOSIS — L97.929 CHRONIC VENOUS HYPERTENSION (IDIOPATHIC) WITH ULCER AND INFLAMMATION OF BILATERAL LOWER EXTREMITY (HCC): ICD-10-CM

## 2021-06-11 DIAGNOSIS — L97.222 NON-PRESSURE CHRONIC ULCER OF LEFT CALF WITH FAT LAYER EXPOSED (HCC): ICD-10-CM

## 2021-06-11 DIAGNOSIS — R60.0 LOCALIZED EDEMA: ICD-10-CM

## 2021-06-11 DIAGNOSIS — I87.333 CHRONIC VENOUS HYPERTENSION (IDIOPATHIC) WITH ULCER AND INFLAMMATION OF BILATERAL LOWER EXTREMITY (HCC): ICD-10-CM

## 2021-06-11 DIAGNOSIS — L97.919 CHRONIC VENOUS HYPERTENSION (IDIOPATHIC) WITH ULCER AND INFLAMMATION OF BILATERAL LOWER EXTREMITY (HCC): ICD-10-CM

## 2021-06-11 DIAGNOSIS — I89.0 LYMPHEDEMA: ICD-10-CM

## 2021-06-11 DIAGNOSIS — L97.212 NON-PRESSURE CHRONIC ULCER OF RIGHT CALF WITH FAT LAYER EXPOSED (HCC): Primary | ICD-10-CM

## 2021-06-11 PROCEDURE — 29581 APPL MULTLAYER CMPRN SYS LEG: CPT

## 2021-06-11 PROCEDURE — 99214 OFFICE O/P EST MOD 30 MIN: CPT

## 2021-06-11 RX ORDER — SULFAMETHOXAZOLE AND TRIMETHOPRIM 400; 80 MG/1; MG/1
1 TABLET ORAL DAILY
Qty: 30 TABLET | Refills: 0 | Status: SHIPPED | OUTPATIENT
Start: 2021-06-11

## 2021-06-11 NOTE — PATIENT INSTRUCTIONS
DISCHARGE PATIENT    Wound Care Patient Instructions/Details      Wound number: All wounds  Wound description: edema related / venous    Wound Cleaning and Dressings:  May shower with protection. Protect wound and dressing.     1. Wash your hands with soap is after hours please call your primary        physician or go to the nearest emergency room. 5.  The treatment plan has been discussed at length with you and your provider. Follow all       instructions carefully, it is very important.  If you do not fo

## 2021-06-11 NOTE — PROGRESS NOTES
Weekly Wound Education Note    Teaching Provided To: Patient  Training Topics: Discharge instructions;Dressing;Edema control;Cleasing and general instructions; Compression  Training Method: Demonstration;Explain/Verbal  Training Response: Patient responds a

## 2021-06-11 NOTE — PROGRESS NOTES
[facility] NOTE  Shubham Gsaca MD  6/11/2021    Chief Complaint:   Patient presents with:  Wound Care: Patient feels that he may be healed today.        HPI:   Lis Kerr is a 67year old male coming in for a follow-up • POTASSIUM CHLORIDE ER 10 MEQ Oral Tab CR TAKE 1 TABLET(10 MEQ) BY MOUTH TWICE DAILY (Patient not taking: Reported on 5/14/2021) 60 tablet 0   • Albuterol Sulfate HFA (VENTOLIN HFA) 108 (90 Base) MCG/ACT Inhalation Aero Soln Inhale 1-2 puffs into the lung Assessment Clean 05/14/21 1011   Drainage Amount Scant 06/11/21 0913   Drainage Description Yellow;Serous 06/11/21 0913   Treatments Compression 05/28/21 0911   Dressing Other 05/14/21 1011   Abigail-wound Assessment Hyperpigmented;Dry;Edema 05/14/21 1011   W kidney disease) stage 3, GFR 30-59 ml/min (HCC)     Class 2 obesity due to excess calories without serious comorbidity with body mass index (BMI) of 35.0 to 35.9 in adult     Edema     Essential hypertension     Generalized osteoarthrosis, involving multip with compression wrap wet. If wraps are too tight as indicated        By pain, numbness/tingling or discoloration of toes remove wrap completely       and call the   wound center. Location: Bilateral  Type: Coflex 2 layers         Off-Loading:   There a examination, counseling and educating the patient/family/caregiver, ordering medications or testing, referring and communicating with other healthcare providers, documenting clinical information in the E HR, independently interpreting results and communica

## 2021-07-07 PROBLEM — N18.31 STAGE 3A CHRONIC KIDNEY DISEASE (HCC): Status: ACTIVE | Noted: 2019-03-04

## 2021-07-07 RX ORDER — SULFAMETHOXAZOLE AND TRIMETHOPRIM 400; 80 MG/1; MG/1
1 TABLET ORAL DAILY
Qty: 30 TABLET | Refills: 0 | OUTPATIENT
Start: 2021-07-07

## 2021-07-08 RX ORDER — SULFAMETHOXAZOLE AND TRIMETHOPRIM 400; 80 MG/1; MG/1
1 TABLET ORAL DAILY
Qty: 30 TABLET | Refills: 0 | OUTPATIENT
Start: 2021-07-08

## 2021-08-01 ENCOUNTER — PATIENT MESSAGE (OUTPATIENT)
Dept: WOUND CARE | Facility: HOSPITAL | Age: 76
End: 2021-08-01

## 2021-08-02 NOTE — TELEPHONE ENCOUNTER
See message below - pls call him. There is medicare rule that home health cannot be certified without being seen in 30 days. Also we can consider palliative wound care for him - that way he can come back PRN on and off.      Hi Dr. Hetal Barcenas

## 2021-08-06 ENCOUNTER — OFFICE VISIT (OUTPATIENT)
Dept: WOUND CARE | Facility: HOSPITAL | Age: 76
End: 2021-08-06
Attending: INTERNAL MEDICINE
Payer: MEDICARE

## 2021-08-06 VITALS
RESPIRATION RATE: 16 BRPM | SYSTOLIC BLOOD PRESSURE: 125 MMHG | DIASTOLIC BLOOD PRESSURE: 62 MMHG | HEART RATE: 55 BPM | TEMPERATURE: 98 F

## 2021-08-06 DIAGNOSIS — L97.222 NON-PRESSURE CHRONIC ULCER OF LEFT CALF WITH FAT LAYER EXPOSED (HCC): ICD-10-CM

## 2021-08-06 DIAGNOSIS — R60.0 LOCALIZED EDEMA: ICD-10-CM

## 2021-08-06 DIAGNOSIS — L08.9 INFECTED WOUND: Primary | ICD-10-CM

## 2021-08-06 DIAGNOSIS — T14.8XXA INFECTED WOUND: Primary | ICD-10-CM

## 2021-08-06 DIAGNOSIS — L97.919 CHRONIC VENOUS HYPERTENSION (IDIOPATHIC) WITH ULCER AND INFLAMMATION OF BILATERAL LOWER EXTREMITY (HCC): ICD-10-CM

## 2021-08-06 DIAGNOSIS — L97.212 NON-PRESSURE CHRONIC ULCER OF RIGHT CALF WITH FAT LAYER EXPOSED (HCC): ICD-10-CM

## 2021-08-06 DIAGNOSIS — I87.333 CHRONIC VENOUS HYPERTENSION (IDIOPATHIC) WITH ULCER AND INFLAMMATION OF BILATERAL LOWER EXTREMITY (HCC): ICD-10-CM

## 2021-08-06 DIAGNOSIS — L97.929 CHRONIC VENOUS HYPERTENSION (IDIOPATHIC) WITH ULCER AND INFLAMMATION OF BILATERAL LOWER EXTREMITY (HCC): ICD-10-CM

## 2021-08-06 DIAGNOSIS — I89.0 LYMPHEDEMA: ICD-10-CM

## 2021-08-06 PROCEDURE — 87186 SC STD MICRODIL/AGAR DIL: CPT | Performed by: INTERNAL MEDICINE

## 2021-08-06 PROCEDURE — 87077 CULTURE AEROBIC IDENTIFY: CPT | Performed by: INTERNAL MEDICINE

## 2021-08-06 PROCEDURE — 87205 SMEAR GRAM STAIN: CPT | Performed by: INTERNAL MEDICINE

## 2021-08-06 PROCEDURE — 87070 CULTURE OTHR SPECIMN AEROBIC: CPT | Performed by: INTERNAL MEDICINE

## 2021-08-06 PROCEDURE — 29581 APPL MULTLAYER CMPRN SYS LEG: CPT

## 2021-08-06 PROCEDURE — 99215 OFFICE O/P EST HI 40 MIN: CPT

## 2021-08-06 NOTE — PATIENT INSTRUCTIONS
Patient Instructions and orders for Wound Care      Wound Cleaning and Dressings:    • Wash your hands with soap and water. Always wear gloves while changing dressings. Donot touch wound / vicente-wound skin with un-gloved hands.  Remove old dressing, discard sprouts, cabbage    • Vitamin A: Dark green, leafy vegetables, orange or yellow vegetables, cantaloupe, fortified dairy products, liver, fortified cereals    • Zinc: Fortified cereals, red meats, seafood    • Consider Donnie by [facility] (These are essent

## 2021-08-06 NOTE — PROGRESS NOTES
[address] CONSULTATION NOTE  Anne-Marie Rodriguez MD  8/6/2021    Subjective   Jesus Sequeira is a 67year old male. Patient presents with:  Wound Care: Patient is here for an initial consult.  He presents with re-opened wounds on both of his lowe Generalized osteoarthrosis, involving multiple sites     History of anemia     History of prediabetes     Late effect of open wound of extremities     Other emphysema (HCC)     Presbyopia     Myopia     Malignant neoplasm of prostate ([address]     Ulcer of Potassium Chloride ER 10 MEQ Oral Tab CR Take 10 mEq by mouth 2 (two) times daily.      • POTASSIUM CHLORIDE ER 10 MEQ Oral Tab CR TAKE 1 TABLET(10 MEQ) BY MOUTH TWICE DAILY (Patient not taking: Reported on 5/14/2021) 180 tablet 0   • POTASSIUM CHLORIDE ER Wound Surface Area (cm^2) 119 cm^2 08/06/21 1517   Wound Depth (cm) 0.1 cm 08/06/21 1517   Wound Volume (cm^3) 11.9 cm^3 08/06/21 1517   Margins Undefined edges 08/06/21 1517   Non-staged Wound Description Full thickness 08/06/21 1517   Abigail-wound Assessme Compression Wrap Location Toes to Knee 08/06/21 1600   Compression Wrap Status Clean;Dry; Intact 08/06/21 1600          PHYSICAL EXAM:   /62   Pulse 55   Temp 98.3 °F (36.8 °C)   Resp 16  Estimated body mass index is 34.02 kg/m² as calculated from the Malignant neoplasm of prostate ([facility]     Ulcer of lower extremity (HCC)     Seasonal allergic rhinitis due to pollen     Senile nuclear sclerosis     Routine medical exam     Regular astigmatism     Primary localized osteoarthrosis of shoulder region below  • Elevate extremities at all times when sitting / laying down.     DIETARY MODIFICATIONS TO HELP WITH WOUND HEALING:    • Protein: Meats, beans, eggs, milk and yogurt particularly Greek yogurt), tofu, soy nuts, soy protein products    • Vitamin C: Ci separately obtained history, performing a medically appropriate examination, counseling and educating the patient/family/caregiver, ordering medications or testing, referring and communicating with other healthcare providers, documenting clinical informati

## 2021-08-10 NOTE — PROGRESS NOTES
Attempted to call patient to relay new orders, left message on identifiable VM with new orders. Called [facility] with new orders - gent ointment with cover dressing to coflex 2 wraps 3 times a week. Patient to be seen in clinic Friday this week.

## 2021-08-13 ENCOUNTER — OFFICE VISIT (OUTPATIENT)
Dept: WOUND CARE | Facility: HOSPITAL | Age: 76
End: 2021-08-13
Attending: INTERNAL MEDICINE
Payer: MEDICARE

## 2021-08-13 VITALS
DIASTOLIC BLOOD PRESSURE: 74 MMHG | TEMPERATURE: 99 F | HEART RATE: 56 BPM | RESPIRATION RATE: 16 BRPM | SYSTOLIC BLOOD PRESSURE: 128 MMHG

## 2021-08-13 DIAGNOSIS — Z22.39 PSEUDOMONAS AERUGINOSA COLONIZATION: ICD-10-CM

## 2021-08-13 DIAGNOSIS — L97.919 CHRONIC VENOUS HYPERTENSION (IDIOPATHIC) WITH ULCER AND INFLAMMATION OF BILATERAL LOWER EXTREMITY (HCC): ICD-10-CM

## 2021-08-13 DIAGNOSIS — L97.222 NON-PRESSURE CHRONIC ULCER OF LEFT CALF WITH FAT LAYER EXPOSED (HCC): ICD-10-CM

## 2021-08-13 DIAGNOSIS — A49.01 STAPH AUREUS INFECTION: ICD-10-CM

## 2021-08-13 DIAGNOSIS — R60.0 LOCALIZED EDEMA: ICD-10-CM

## 2021-08-13 DIAGNOSIS — I89.0 LYMPHEDEMA: ICD-10-CM

## 2021-08-13 DIAGNOSIS — L97.929 CHRONIC VENOUS HYPERTENSION (IDIOPATHIC) WITH ULCER AND INFLAMMATION OF BILATERAL LOWER EXTREMITY (HCC): ICD-10-CM

## 2021-08-13 DIAGNOSIS — L97.212 NON-PRESSURE CHRONIC ULCER OF RIGHT CALF WITH FAT LAYER EXPOSED (HCC): Primary | ICD-10-CM

## 2021-08-13 DIAGNOSIS — I87.333 CHRONIC VENOUS HYPERTENSION (IDIOPATHIC) WITH ULCER AND INFLAMMATION OF BILATERAL LOWER EXTREMITY (HCC): ICD-10-CM

## 2021-08-13 PROCEDURE — 99214 OFFICE O/P EST MOD 30 MIN: CPT

## 2021-08-13 NOTE — PATIENT INSTRUCTIONS
Patient Instructions and orders for Wound Care        Wound Cleaning and Dressings:     · Wash your hands with soap and water. Always wear gloves while changing dressings. Donot touch wound / vicente-wound skin with un-gloved hands.  Remove old dressing, disca Citrus fruits and juices, strawberries, tomatoes, tomato juice, peppers, baked potatoes, spinach, broccoli, cauliflower, [place] sprouts, cabbage     · Vitamin A: Dark green, leafy vegetables, orange or yellow vegetables, cantaloupe, fortified dairy produ

## 2021-08-13 NOTE — PROGRESS NOTES
Weekly Wound Education Note    Teaching Provided To: Patient  Training Topics: Discharge instructions;Dressing;Edema control;Cleasing and general instructions; Compression  Training Method: Explain/Verbal;Demonstration  Training Response: Patient responds a

## 2021-08-13 NOTE — PROGRESS NOTES
[facility] [address] NOTE  Shane Mckenzie MD  8/13/2021    Chief Complaint:   Patient presents with:  Wound Care: Patients is here for a follow up.  Patients is denies any issues       HPI:   Iban Bellamy is a 67year old male comi TAKE 1 TABLET(10 MEQ) BY MOUTH TWICE DAILY (Patient not taking: Reported on 5/14/2021) 60 tablet 0   • Albuterol Sulfate HFA (VENTOLIN HFA) 108 (90 Base) MCG/ACT Inhalation Aero Soln Inhale 1-2 puffs into the lungs.      • Montelukast Sodium 10 MG Oral Tab 08/13/21 1316   Drainage Description Serous; Yellow 08/13/21 1316   Treatments Compression; Topical (Barrier/Moisturizer/Ointment) 08/06/21 1515   Dressing Other;Tape;Kerlix roll 08/06/21 1515   Wound Length (cm) 12.1 cm 08/13/21 1316   Wound Width (cm) 16 c Problem List  Patient Active Problem List:     Varicose veins of lower extremities with ulcer ([facility]     Advance directive discussed with patient     At risk for cardiovascular event     Chronic venous insufficiency     Stage 3a chronic kidney disease ([facility] level of the heart when sitting or as much as possible. 4.  Take your diuretics as directed by your provider. Do not skip doses or change doses      unless instructed to do so by your provider. 5. Do not get leg(s) with compression wrap wet.  If wrap emergencies, please call your primary physician or go to the nearest emergency room. Patient/Caregiver Education: There are no barriers to learning. Medical education for above diagnosis given. Answered all questions.     Outcome: Patient verbalize

## 2021-08-20 ENCOUNTER — APPOINTMENT (OUTPATIENT)
Dept: WOUND CARE | Facility: HOSPITAL | Age: 76
End: 2021-08-20
Attending: INTERNAL MEDICINE
Payer: MEDICARE

## 2021-08-27 ENCOUNTER — APPOINTMENT (OUTPATIENT)
Dept: WOUND CARE | Facility: HOSPITAL | Age: 76
End: 2021-08-27
Attending: INTERNAL MEDICINE
Payer: MEDICARE

## 2021-09-10 ENCOUNTER — OFFICE VISIT (OUTPATIENT)
Dept: WOUND CARE | Facility: HOSPITAL | Age: 76
End: 2021-09-10
Attending: INTERNAL MEDICINE
Payer: MEDICARE

## 2021-09-10 VITALS
SYSTOLIC BLOOD PRESSURE: 109 MMHG | TEMPERATURE: 98 F | RESPIRATION RATE: 16 BRPM | HEART RATE: 57 BPM | DIASTOLIC BLOOD PRESSURE: 47 MMHG

## 2021-09-10 DIAGNOSIS — L08.9 INFECTED WOUND: ICD-10-CM

## 2021-09-10 DIAGNOSIS — L97.212 NON-PRESSURE CHRONIC ULCER OF RIGHT CALF WITH FAT LAYER EXPOSED (HCC): Primary | ICD-10-CM

## 2021-09-10 DIAGNOSIS — L97.222 NON-PRESSURE CHRONIC ULCER OF LEFT CALF WITH FAT LAYER EXPOSED (HCC): ICD-10-CM

## 2021-09-10 DIAGNOSIS — I87.333 CHRONIC VENOUS HYPERTENSION (IDIOPATHIC) WITH ULCER AND INFLAMMATION OF BILATERAL LOWER EXTREMITY (HCC): ICD-10-CM

## 2021-09-10 DIAGNOSIS — T14.8XXA INFECTED WOUND: ICD-10-CM

## 2021-09-10 DIAGNOSIS — L97.919 CHRONIC VENOUS HYPERTENSION (IDIOPATHIC) WITH ULCER AND INFLAMMATION OF BILATERAL LOWER EXTREMITY (HCC): ICD-10-CM

## 2021-09-10 DIAGNOSIS — L97.929 CHRONIC VENOUS HYPERTENSION (IDIOPATHIC) WITH ULCER AND INFLAMMATION OF BILATERAL LOWER EXTREMITY (HCC): ICD-10-CM

## 2021-09-10 DIAGNOSIS — R60.0 LOCALIZED EDEMA: ICD-10-CM

## 2021-09-10 PROCEDURE — 29581 APPL MULTLAYER CMPRN SYS LEG: CPT

## 2021-09-10 PROCEDURE — 87205 SMEAR GRAM STAIN: CPT | Performed by: INTERNAL MEDICINE

## 2021-09-10 PROCEDURE — 87186 SC STD MICRODIL/AGAR DIL: CPT | Performed by: INTERNAL MEDICINE

## 2021-09-10 PROCEDURE — 87077 CULTURE AEROBIC IDENTIFY: CPT | Performed by: INTERNAL MEDICINE

## 2021-09-10 PROCEDURE — 87070 CULTURE OTHR SPECIMN AEROBIC: CPT | Performed by: INTERNAL MEDICINE

## 2021-09-10 PROCEDURE — 99214 OFFICE O/P EST MOD 30 MIN: CPT

## 2021-09-10 NOTE — PATIENT INSTRUCTIONS
Patient Instructions and orders for Wound Care        Wound Cleaning and Dressings:     · Wash your hands with soap and water. Always wear gloves while changing dressings. Donot touch wound / vicente-wound skin with un-gloved hands.  Remove old dressing, disca juices, strawberries, tomatoes, tomato juice, peppers, baked potatoes, spinach, broccoli, cauliflower, [place] sprouts, cabbage     · Vitamin A: Dark green, leafy vegetables, orange or yellow vegetables, cantaloupe, fortified dairy products, liver, fortif

## 2021-09-10 NOTE — PROGRESS NOTES
[facility] [address] NOTE  kAilah Child MD  9/10/2021    Chief Complaint:   Patient presents with:  Wound Care: Patient is here for a wound care follow up. He denies any pain or new concerns.       HPI:   Sarai Gayle is a 67 year o 5/14/2021) 180 tablet 0   • POTASSIUM CHLORIDE ER 10 MEQ Oral Tab CR TAKE 1 TABLET(10 MEQ) BY MOUTH TWICE DAILY (Patient not taking: Reported on 5/14/2021) 60 tablet 0   • Albuterol Sulfate HFA (VENTOLIN HFA) 108 (90 Base) MCG/ACT Inhalation Aero [facility] Left;Lower (Active)   Wound Image     09/10/21 0850   Drainage Amount Moderate 09/10/21 0850   Drainage Description Serous; Yellow 09/10/21 0850   Treatments Compression; Topical (Barrier/Moisturizer/Ointment) 08/13/21 1316   Dressing Other;Tape;ABD Pad 08/1 inflammation of bilateral lower extremity (HCC)  Localized edema    Problem List  Patient Active Problem List:     Varicose veins of lower extremities with ulcer ([facility]     Advance directive discussed with patient     At risk for cardiovascular event     Ch  Hand wash stockings and      hang dry as needed.     2.  Avoid prolonged standing in one place.  It is better to have your calf muscles moving       to pump fluid out of the legs.     3.  Elevate leg(s) above the level of the heart when sitting or as much life.  2. Please call the clinic during regular business hours ( 7:30 AM - 5:30 PM) if you notice increased redness, warmth, pain or pus like drainage or start running a fever greater than 100. 3.    3.  For after hour emergencies, please call your primary p

## 2021-09-13 NOTE — PROGRESS NOTES
Reviewed culture results and new orders with patient. All questions answered. Patient will  the antibiotic tomorrow morning.

## 2021-09-24 ENCOUNTER — OFFICE VISIT (OUTPATIENT)
Dept: WOUND CARE | Facility: HOSPITAL | Age: 76
End: 2021-09-24
Attending: INTERNAL MEDICINE
Payer: MEDICARE

## 2021-09-24 VITALS
SYSTOLIC BLOOD PRESSURE: 125 MMHG | TEMPERATURE: 98 F | RESPIRATION RATE: 18 BRPM | DIASTOLIC BLOOD PRESSURE: 76 MMHG | HEART RATE: 55 BPM

## 2021-09-24 DIAGNOSIS — L97.222 NON-PRESSURE CHRONIC ULCER OF LEFT CALF WITH FAT LAYER EXPOSED (HCC): ICD-10-CM

## 2021-09-24 DIAGNOSIS — R60.0 LOCALIZED EDEMA: ICD-10-CM

## 2021-09-24 DIAGNOSIS — L97.929 CHRONIC VENOUS HYPERTENSION (IDIOPATHIC) WITH ULCER AND INFLAMMATION OF BILATERAL LOWER EXTREMITY (HCC): ICD-10-CM

## 2021-09-24 DIAGNOSIS — L97.919 CHRONIC VENOUS HYPERTENSION (IDIOPATHIC) WITH ULCER AND INFLAMMATION OF BILATERAL LOWER EXTREMITY (HCC): ICD-10-CM

## 2021-09-24 DIAGNOSIS — I87.333 CHRONIC VENOUS HYPERTENSION (IDIOPATHIC) WITH ULCER AND INFLAMMATION OF BILATERAL LOWER EXTREMITY (HCC): ICD-10-CM

## 2021-09-24 DIAGNOSIS — L97.212 NON-PRESSURE CHRONIC ULCER OF RIGHT CALF WITH FAT LAYER EXPOSED (HCC): Primary | ICD-10-CM

## 2021-09-24 PROCEDURE — 11042 DBRDMT SUBQ TIS 1ST 20SQCM/<: CPT | Performed by: INTERNAL MEDICINE

## 2021-09-24 PROCEDURE — 99215 OFFICE O/P EST HI 40 MIN: CPT

## 2021-09-24 PROCEDURE — 11045 DBRDMT SUBQ TISS EACH ADDL: CPT | Performed by: INTERNAL MEDICINE

## 2021-09-24 PROCEDURE — 29581 APPL MULTLAYER CMPRN SYS LEG: CPT

## 2021-09-24 NOTE — PATIENT INSTRUCTIONS
Patient Instructions and orders for Wound Care        Wound Cleaning and Dressings:     · Wash your hands with soap and water. Always wear gloves while changing dressings. Donot touch wound / vicente-wound skin with un-gloved hands.  Remove old dressing, disca juices, strawberries, tomatoes, tomato juice, peppers, baked potatoes, spinach, broccoli, cauliflower, [place] sprouts, cabbage     · Vitamin A: Dark green, leafy vegetables, orange or yellow vegetables, cantaloupe, fortified dairy products, liver, fortif

## 2021-09-24 NOTE — PROGRESS NOTES
[facility] NOTE  Dave Thompson MD  9/24/2021    Chief Complaint:   Patient presents with:  Wound Care: Pt here for follow up. He states no new concerns or pain.       HPI:   Anali Bishop is a 67year old male coming in for 108 (90 Base) MCG/ACT Inhalation Aero Soln Inhale 1-2 puffs into the lungs.      • Montelukast Sodium 10 MG Oral Tab TK 1 T PO D  1   • Pentoxifylline  MG Oral Tab CR TK 1 T PO TID  1   • SPIRIVA RESPIMAT 1.25 MCG/ACT Inhalation Aero Soln INHALE 2 PUF (cm) 19.5 cm 09/24/21 0839   Wound Width (cm) 16.6 cm 09/24/21 0839   Wound Surface Area (cm^2) 323.7 cm^2 09/24/21 0839   Wound Depth (cm) 0.1 cm 09/24/21 0839   Wound Volume (cm^3) 32.37 cm^3 09/24/21 0839   Wound Healing % -27 09/24/21 0839   Margins We Chronic venous insufficiency     Stage 3a chronic kidney disease (HCC)     Class 2 obesity due to excess calories without serious comorbidity with body mass index (BMI) of 35.0 to 35.9 in adult     Edema     Essential hypertension     Generalized osteoarth procedure a time out was called  Performed by: provider  Debridement type: surgical  Level of debridement: subcutaneous tissue  Pain control: lidocaine 4%  Pre-debridement measurements  Length (cm): 19.5  Width (cm): 16.6  Depth (cm): 0.1  Surface Area (cm  Do not skip doses or change doses      unless instructed to do so by your provider.     5. Do not get leg(s) with compression wrap wet.  If wraps are too tight as indicated        By pain, numbness/tingling or discoloration of toes remove wrap completely Placed This Encounter      Debridement      Debridement      Patient/Caregiver Education: There are no barriers to learning. Medical education for above diagnosis given. Answered all questions. Outcome: Patient verbalizes understanding.  Patient is not

## 2021-09-24 NOTE — PROGRESS NOTES
Patient ID: Nolvia Grande is a 67year old male. Debridement   Wound 08/06/21 #1 Right lower leg Venous Ulcer Leg Right; Lower    Consent obtained? verbal  Consent given by: patient  Risks discussed?  procedural risks discussed  Time out called at 9/24/202 pressure  Procedural pain (0-10): 0  Post-procedural pain: 0   Response to treatment: procedure was tolerated well

## 2021-09-24 NOTE — PROGRESS NOTES
Weekly Wound Education Note    Teaching Provided To: Patient  Training Topics: Cleasing and general instructions; Compression; Edema control; Dressing;  Discharge instructions  Training Method: Demonstration; Explain/Verbal  Training Response: Patient Manfred Royal

## 2021-10-15 ENCOUNTER — OFFICE VISIT (OUTPATIENT)
Dept: WOUND CARE | Facility: HOSPITAL | Age: 76
End: 2021-10-15
Attending: INTERNAL MEDICINE
Payer: MEDICARE

## 2021-10-15 VITALS
HEART RATE: 55 BPM | SYSTOLIC BLOOD PRESSURE: 114 MMHG | TEMPERATURE: 98 F | DIASTOLIC BLOOD PRESSURE: 67 MMHG | RESPIRATION RATE: 16 BRPM

## 2021-10-15 DIAGNOSIS — I89.0 LYMPHEDEMA: ICD-10-CM

## 2021-10-15 DIAGNOSIS — L97.212 NON-PRESSURE CHRONIC ULCER OF RIGHT CALF WITH FAT LAYER EXPOSED (HCC): ICD-10-CM

## 2021-10-15 DIAGNOSIS — L97.919 CHRONIC VENOUS HYPERTENSION (IDIOPATHIC) WITH ULCER AND INFLAMMATION OF BILATERAL LOWER EXTREMITY (HCC): ICD-10-CM

## 2021-10-15 DIAGNOSIS — L97.929 CHRONIC VENOUS HYPERTENSION (IDIOPATHIC) WITH ULCER AND INFLAMMATION OF BILATERAL LOWER EXTREMITY (HCC): ICD-10-CM

## 2021-10-15 DIAGNOSIS — I87.333 CHRONIC VENOUS HYPERTENSION (IDIOPATHIC) WITH ULCER AND INFLAMMATION OF BILATERAL LOWER EXTREMITY (HCC): ICD-10-CM

## 2021-10-15 DIAGNOSIS — L97.222 NON-PRESSURE CHRONIC ULCER OF LEFT CALF WITH FAT LAYER EXPOSED (HCC): ICD-10-CM

## 2021-10-15 DIAGNOSIS — T14.8XXA INFECTED WOUND: Primary | ICD-10-CM

## 2021-10-15 DIAGNOSIS — L08.9 INFECTED WOUND: Primary | ICD-10-CM

## 2021-10-15 PROCEDURE — 87070 CULTURE OTHR SPECIMN AEROBIC: CPT | Performed by: INTERNAL MEDICINE

## 2021-10-15 PROCEDURE — 99214 OFFICE O/P EST MOD 30 MIN: CPT

## 2021-10-15 PROCEDURE — 87205 SMEAR GRAM STAIN: CPT | Performed by: INTERNAL MEDICINE

## 2021-10-15 PROCEDURE — 87186 SC STD MICRODIL/AGAR DIL: CPT | Performed by: INTERNAL MEDICINE

## 2021-10-15 PROCEDURE — 87077 CULTURE AEROBIC IDENTIFY: CPT | Performed by: INTERNAL MEDICINE

## 2021-10-15 NOTE — PROGRESS NOTES
[facility] [address] NOTE  Florencio Mccallum MD  10/15/2021    Chief Complaint:   Patient presents with:  Wound Care: Patient is here for a wound care follow up. He states that he is having intermmittent pain again.  He does not feel that his wounds times a day. • omeprazole 20 MG Oral Capsule Delayed Release Take 20 mg by mouth daily. • Potassium Chloride ER 10 MEQ Oral Tab CR Take 10 mEq by mouth 2 (two) times daily. • Pravastatin Sodium 10 MG Oral Tab Take 10 tablets by mouth daily. Abigail-wound Assessment Edema; Moist;Hyperpigmented 10/15/21 0841   Wound Granulation Tissue Pink;Pale Marvin;Spongy 10/15/21 0841   Wound Bed Granulation (%) 5 % 10/15/21 0841   Wound Bed Epithelium (%) 10 % 10/15/21 0841   Wound Bed Slough (%) 85 % 10/15/21 Compression Wrap Status Clean;Dry; Intact 09/24/21 5365           ASSESSMENT AND PLAN:     Assessment   Assessment    Encounter Diagnosis  Infected wound  (primary encounter diagnosis)  Chronic venous hypertension (idiopathic) with ulcer and inflammation discard and place into trash.       ·  Apply zinc barrier cream in the periwound area.    · Apply endoform collagen on the wound base; secondary dressing-Hydrofera transfer; Kerramax/Super absorbent  Cleansing: Cleanse with normal saline or wound cleanser fortified cereals     · Zinc: Fortified cereals, red meats, seafood     · Consider Donnie by [employer] (These are essential branch chain amino acids that help with tissue building and wound healing) and take 2 packets/day.  you can order online at [employer] Return in 1 week (on 10/22/2021), or Schedule appointment with me after discharged from the hospital., for Wound followup.     Comment: Please note this report has been produced using speech recognition software and may contain errors related to that system

## 2021-10-15 NOTE — PATIENT INSTRUCTIONS
Patient Instructions and orders for Wound Care        Wound Cleaning and Dressings:     · Wash your hands with soap and water. Always wear gloves while changing dressings. Donot touch wound / vicente-wound skin with un-gloved hands.  Remove old dressing, disca juices, strawberries, tomatoes, tomato juice, peppers, baked potatoes, spinach, broccoli, cauliflower, [place] sprouts, cabbage     · Vitamin A: Dark green, leafy vegetables, orange or yellow vegetables, cantaloupe, fortified dairy products, liver, fortif

## 2021-10-18 ENCOUNTER — TELEPHONE (OUTPATIENT)
Dept: WOUND CARE | Facility: HOSPITAL | Age: 76
End: 2021-10-18

## 2021-10-18 NOTE — TELEPHONE ENCOUNTER
Called [facility] to speak with Armando Rhodes, left message with Di Leyva. Patient to do gent. 2-3 times a day and use spandagrip. Lab results and recommendations also faxed to [facility].

## 2021-10-18 NOTE — PROGRESS NOTES
Spoke to patient in regards to culture results. Patient was not admitted at [facility], was seen in ER - IV abx given - per patient and oral abx started. Pt states Dixon Bellamy nurse will be seeing him and applying the topical abx and being wrapped.  Informed him it

## 2021-10-29 ENCOUNTER — OFFICE VISIT (OUTPATIENT)
Dept: WOUND CARE | Facility: HOSPITAL | Age: 76
End: 2021-10-29
Attending: INTERNAL MEDICINE
Payer: MEDICARE

## 2021-10-29 VITALS
SYSTOLIC BLOOD PRESSURE: 130 MMHG | TEMPERATURE: 98 F | HEART RATE: 67 BPM | RESPIRATION RATE: 18 BRPM | DIASTOLIC BLOOD PRESSURE: 77 MMHG

## 2021-10-29 DIAGNOSIS — I87.333 CHRONIC VENOUS HYPERTENSION (IDIOPATHIC) WITH ULCER AND INFLAMMATION OF BILATERAL LOWER EXTREMITY (HCC): ICD-10-CM

## 2021-10-29 DIAGNOSIS — B96.5 PSEUDOMONAS (AERUGINOSA) (MALLEI) (PSEUDOMALLEI) AS THE CAUSE OF DISEASES CLASSIFIED ELSEWHERE: ICD-10-CM

## 2021-10-29 DIAGNOSIS — T14.8XXA INFECTED WOUND: Primary | ICD-10-CM

## 2021-10-29 DIAGNOSIS — B95.2 ENTEROCOCCUS FAECALIS INFECTION: ICD-10-CM

## 2021-10-29 DIAGNOSIS — A49.8 KLEBSIELLA INFECTION: ICD-10-CM

## 2021-10-29 DIAGNOSIS — L97.919 CHRONIC VENOUS HYPERTENSION (IDIOPATHIC) WITH ULCER AND INFLAMMATION OF BILATERAL LOWER EXTREMITY (HCC): ICD-10-CM

## 2021-10-29 DIAGNOSIS — L97.212 NON-PRESSURE CHRONIC ULCER OF RIGHT CALF WITH FAT LAYER EXPOSED (HCC): ICD-10-CM

## 2021-10-29 DIAGNOSIS — A49.01 STAPH AUREUS INFECTION: ICD-10-CM

## 2021-10-29 DIAGNOSIS — L08.9 INFECTED WOUND: Primary | ICD-10-CM

## 2021-10-29 DIAGNOSIS — L97.222 NON-PRESSURE CHRONIC ULCER OF LEFT CALF WITH FAT LAYER EXPOSED (HCC): ICD-10-CM

## 2021-10-29 DIAGNOSIS — I89.0 LYMPHEDEMA: ICD-10-CM

## 2021-10-29 DIAGNOSIS — L97.929 CHRONIC VENOUS HYPERTENSION (IDIOPATHIC) WITH ULCER AND INFLAMMATION OF BILATERAL LOWER EXTREMITY (HCC): ICD-10-CM

## 2021-10-29 PROCEDURE — 97598 DBRDMT OPN WND ADDL 20CM/<: CPT | Performed by: INTERNAL MEDICINE

## 2021-10-29 PROCEDURE — 99214 OFFICE O/P EST MOD 30 MIN: CPT

## 2021-10-29 PROCEDURE — 97597 DBRDMT OPN WND 1ST 20 CM/<: CPT | Performed by: INTERNAL MEDICINE

## 2021-10-29 RX ORDER — GENTAMICIN SULFATE 1 MG/G
1 OINTMENT TOPICAL 3 TIMES DAILY
Qty: 30 G | Refills: 3 | Status: SHIPPED | OUTPATIENT
Start: 2021-10-29

## 2021-10-29 RX ORDER — CLINDAMYCIN HYDROCHLORIDE 150 MG/1
CAPSULE ORAL
COMMUNITY
Start: 2021-10-15

## 2021-10-29 RX ORDER — FLUTICASONE PROPIONATE 50 MCG
SPRAY, SUSPENSION (ML) NASAL
COMMUNITY
Start: 2021-08-27

## 2021-10-29 RX ORDER — CIPROFLOXACIN 500 MG/1
500 TABLET, FILM COATED ORAL 2 TIMES DAILY
Qty: 14 TABLET | Refills: 0 | Status: SHIPPED | OUTPATIENT
Start: 2021-10-29 | End: 2021-11-05

## 2021-10-29 RX ORDER — ASPIRIN 81 MG/1
81 TABLET, COATED ORAL
COMMUNITY
Start: 2021-10-04

## 2021-10-29 RX ORDER — PENTOXIFYLLINE 400 MG/1
400 TABLET, EXTENDED RELEASE ORAL 3 TIMES DAILY
COMMUNITY
Start: 2021-09-06

## 2021-10-29 NOTE — PROGRESS NOTES
Igor [facility] NOTE  Nathanael Pak MD  10/29/2021    Chief Complaint:   Patient presents with:  Wound Care: Pt here for follow up.  He states the ER gave him a dose of IV abx the other week than discharged him on oral abx that he has since palpitations  Denies fever.      Allergies    Seasonal                OTHER (SEE COMMENTS)    Comment:Eyes water    Current Meds:  Current Outpatient Medications   Medication Sig Dispense Refill   • gentamicin 0.1 % External Ointment Apply 1 Application top Resp: 18   Temp: 98.2 °F (36.8 °C)       Wound Assessment  Wound 08/06/21 #1 Right lower leg Venous Ulcer Leg Right; Lower (Active)   Wound Image   10/29/21 0942   Drainage Amount Large 10/29/21 0908   Drainage Description Serosanguineous;Green 10/29/21 0 Wound Odor Strong 10/29/21 0909   Shape measured in a cluster 10/29/21 0909       Compression Wrap 08/06/21 Leg Right (Active)   Response to Treatment Well tolerated 09/24/21 0839   Compression Layers 2 09/24/21 0839   Compression Product Type Coflex 09/ Malignant neoplasm of prostate ([facility] [address]     Ulcer of lower extremity (HCC)     Seasonal allergic rhinitis due to pollen     Senile nuclear sclerosis     Routine medical exam     Regular astigmatism     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     Orders  Orders Placed This Encounter      Debridement or concerns please feel free to contact the dictating provider for clarification.     Susan Doran MD  10/29/2021  10:01 AM

## 2021-10-29 NOTE — PROGRESS NOTES
Patient ID: Gume Sanchez is a 67year old male. Debridement   Wound 08/06/21 #1 Right lower leg Venous Ulcer Leg Right; Lower    Consent obtained? verbal  Consent given by: patient  Risks discussed?  procedural risks discussed  Time out called at 10/29/20

## 2021-10-29 NOTE — PROGRESS NOTES
Weekly Wound Education Note    Teaching Provided To: Patient  Training Topics: Discharge instructions;Dressing;Cleasing and general instructions;Edema control; Compression  Training Method: Demonstration;Explain/Verbal  Training Response: Patient responds a

## 2021-11-12 ENCOUNTER — OFFICE VISIT (OUTPATIENT)
Dept: WOUND CARE | Facility: HOSPITAL | Age: 76
End: 2021-11-12
Attending: INTERNAL MEDICINE
Payer: MEDICARE

## 2021-11-12 VITALS
RESPIRATION RATE: 16 BRPM | SYSTOLIC BLOOD PRESSURE: 112 MMHG | DIASTOLIC BLOOD PRESSURE: 74 MMHG | TEMPERATURE: 99 F | HEART RATE: 56 BPM

## 2021-11-12 DIAGNOSIS — B96.5 PSEUDOMONAS (AERUGINOSA) (MALLEI) (PSEUDOMALLEI) AS THE CAUSE OF DISEASES CLASSIFIED ELSEWHERE: ICD-10-CM

## 2021-11-12 DIAGNOSIS — L08.9 INFECTED WOUND: ICD-10-CM

## 2021-11-12 DIAGNOSIS — L97.222 NON-PRESSURE CHRONIC ULCER OF LEFT CALF WITH FAT LAYER EXPOSED (HCC): ICD-10-CM

## 2021-11-12 DIAGNOSIS — L97.212 NON-PRESSURE CHRONIC ULCER OF RIGHT CALF WITH FAT LAYER EXPOSED (HCC): ICD-10-CM

## 2021-11-12 DIAGNOSIS — I87.333 CHRONIC VENOUS HYPERTENSION (IDIOPATHIC) WITH ULCER AND INFLAMMATION OF BILATERAL LOWER EXTREMITY (HCC): Primary | ICD-10-CM

## 2021-11-12 DIAGNOSIS — T14.8XXA INFECTED WOUND: ICD-10-CM

## 2021-11-12 DIAGNOSIS — L97.919 CHRONIC VENOUS HYPERTENSION (IDIOPATHIC) WITH ULCER AND INFLAMMATION OF BILATERAL LOWER EXTREMITY (HCC): Primary | ICD-10-CM

## 2021-11-12 DIAGNOSIS — L97.929 CHRONIC VENOUS HYPERTENSION (IDIOPATHIC) WITH ULCER AND INFLAMMATION OF BILATERAL LOWER EXTREMITY (HCC): Primary | ICD-10-CM

## 2021-11-12 DIAGNOSIS — I89.0 LYMPHEDEMA: ICD-10-CM

## 2021-11-12 PROCEDURE — 29581 APPL MULTLAYER CMPRN SYS LEG: CPT

## 2021-11-12 PROCEDURE — 99215 OFFICE O/P EST HI 40 MIN: CPT

## 2021-11-12 NOTE — PATIENT INSTRUCTIONS
Patient Instructions and orders for Wound Care        Wound Cleaning and Dressings:     · Wash your hands with soap and water. Always wear gloves while changing dressings. Donot touch wound / vicente-wound skin with un-gloved hands.  Remove old dressing, disca juices, strawberries, tomatoes, tomato juice, peppers, baked potatoes, spinach, broccoli, cauliflower, [place] sprouts, cabbage     · Vitamin A: Dark green, leafy vegetables, orange or yellow vegetables, cantaloupe, fortified dairy products, liver, fortif

## 2021-11-12 NOTE — PROGRESS NOTES
Igor [facility] NOTE  Luis Eduardo Barahona MD  11/12/2021    Chief Complaint:   Patient presents with:  Wound Care: Follow up for bilateral leg wounds. New wound to Left 2nd toe. Patient arrived wearing ACE wraps to bilateral legs.        HPI:   Canseco TWICE DAILY 180 tablet 0   • POTASSIUM CHLORIDE ER 10 MEQ Oral Tab CR TAKE 1 TABLET(10 MEQ) BY MOUTH TWICE DAILY 180 tablet 0   • POTASSIUM CHLORIDE ER 10 MEQ Oral Tab CR TAKE 1 TABLET(10 MEQ) BY MOUTH TWICE DAILY 60 tablet 0   • Albuterol Sulfate  Wound Length (cm) 17.6 cm 11/12/21 0936   Wound Width (cm) 21.5 cm 11/12/21 0936   Wound Surface Area (cm^2) 378.4 cm^2 11/12/21 0936   Wound Depth (cm) 0.2 cm 11/12/21 0936   Wound Volume (cm^3) 75.68 cm^3 11/12/21 0936   Wound Healing % -196 11/12/21 0 Toes to Knee 09/24/21 0839   Compression Wrap Status Clean;Dry; Intact 09/24/21 0839           ASSESSMENT AND PLAN:     Assessment   Assessment    Encounter Diagnosis  Chronic venous hypertension (idiopathic) with ulcer and inflammation of bilateral lower e base; secondary dressing-Hydrofera transfer; Kerramax/Super absorbent  · Cleansing: Cleanse with normal saline or wound cleanser  ·  Shower with protection  · Change dressing three times  A week by [facility]     Compression Therapy :  COFLEX-2     Compression Th branch chain amino acids that help with tissue building and wound healing) and take 2 packets/day. you can order online at [employer] or [employer] REMINDERS:     1. The treatment plan has been discussed at length with you and your provider.  Follow errors in grammar, punctuation, and spelling, as well as words and phrases that may be inappropriate. If there are any questions or concerns please feel free to contact the dictating provider for clarification.     Cornelio Ward MD  11/12/2021  10:02

## 2021-11-17 ENCOUNTER — TELEPHONE (OUTPATIENT)
Dept: WOUND CARE | Facility: HOSPITAL | Age: 76
End: 2021-11-17

## 2021-11-17 NOTE — TELEPHONE ENCOUNTER
Patient called asking if any antibiotics are needed at this time. Provider states no antibiotics needed at this time. Returned pt's call and made aware no antibiotics needed, he states he verbalizes understanding.

## 2021-11-26 ENCOUNTER — TELEPHONE (OUTPATIENT)
Dept: WOUND CARE | Facility: HOSPITAL | Age: 76
End: 2021-11-26

## 2021-11-26 NOTE — TELEPHONE ENCOUNTER
Chidi Fletcher stated the wound is deeper with increased drainage, went through all dressings. She stated the drainage has an odor and is green in color. She stated the patient is in sever pain, taking Ibuprofen 800mg every few hours.   She stated the patient had t

## 2021-11-30 ENCOUNTER — TELEPHONE (OUTPATIENT)
Dept: WOUND CARE | Facility: HOSPITAL | Age: 76
End: 2021-11-30

## 2021-11-30 NOTE — TELEPHONE ENCOUNTER
Spoke with patient regarding his wounds and the increased pain, drainage and odor coming from them. He's asking if he can be placed back on bactrim. Writer explained per the provider the bacteria he was growing is resistant to the bactrim.  Patient continue

## 2021-12-03 ENCOUNTER — APPOINTMENT (OUTPATIENT)
Dept: WOUND CARE | Facility: HOSPITAL | Age: 76
End: 2021-12-03
Attending: INTERNAL MEDICINE
Payer: MEDICARE

## 2021-12-08 ENCOUNTER — TELEPHONE (OUTPATIENT)
Dept: WOUND CARE | Facility: HOSPITAL | Age: 76
End: 2021-12-08

## 2022-01-07 ENCOUNTER — APPOINTMENT (OUTPATIENT)
Dept: WOUND CARE | Facility: HOSPITAL | Age: 77
End: 2022-01-07
Attending: INTERNAL MEDICINE
Payer: MEDICARE

## 2022-01-14 ENCOUNTER — OFFICE VISIT (OUTPATIENT)
Dept: WOUND CARE | Facility: HOSPITAL | Age: 77
End: 2022-01-14
Attending: INTERNAL MEDICINE
Payer: MEDICARE

## 2022-01-14 VITALS
RESPIRATION RATE: 18 BRPM | TEMPERATURE: 97 F | SYSTOLIC BLOOD PRESSURE: 146 MMHG | HEART RATE: 63 BPM | DIASTOLIC BLOOD PRESSURE: 82 MMHG

## 2022-01-14 DIAGNOSIS — I89.0 LYMPHEDEMA: ICD-10-CM

## 2022-01-14 DIAGNOSIS — L97.222 NON-PRESSURE CHRONIC ULCER OF LEFT CALF WITH FAT LAYER EXPOSED (HCC): ICD-10-CM

## 2022-01-14 DIAGNOSIS — L97.212 NON-PRESSURE CHRONIC ULCER OF RIGHT CALF WITH FAT LAYER EXPOSED (HCC): ICD-10-CM

## 2022-01-14 DIAGNOSIS — I87.333 CHRONIC VENOUS HYPERTENSION (IDIOPATHIC) WITH ULCER AND INFLAMMATION OF BILATERAL LOWER EXTREMITY (HCC): Primary | ICD-10-CM

## 2022-01-14 DIAGNOSIS — L08.9 INFECTED WOUND: ICD-10-CM

## 2022-01-14 DIAGNOSIS — L97.929 CHRONIC VENOUS HYPERTENSION (IDIOPATHIC) WITH ULCER AND INFLAMMATION OF BILATERAL LOWER EXTREMITY (HCC): Primary | ICD-10-CM

## 2022-01-14 DIAGNOSIS — L97.919 CHRONIC VENOUS HYPERTENSION (IDIOPATHIC) WITH ULCER AND INFLAMMATION OF BILATERAL LOWER EXTREMITY (HCC): Primary | ICD-10-CM

## 2022-01-14 DIAGNOSIS — T14.8XXA INFECTED WOUND: ICD-10-CM

## 2022-01-14 PROCEDURE — 99214 OFFICE O/P EST MOD 30 MIN: CPT

## 2022-01-14 NOTE — PROGRESS NOTES
Igor 36 NOTE  Martir Mcmahon MD  1/14/2022    Chief Complaint:   Patient presents with:  Wound Care: Patient is here for a follow up of bilateral lower extremity wounds.       HPI:   Lakia Hughes is a 67year old male com position after induction of adequate general anesthesia and appropriate IV antibiosis patient's bilateral lower extremities were prepped and draped in standard surgical fashion.  Attention was turned to the left lower extremity which had both anterior and p ELLIPTA) 200-62.5-25 MCG/INH Inhalation Aerosol Powder, Breath Activated Inhale 1 puff into the lungs every morning. • gabapentin 300 MG Oral Cap Take 300 mg by mouth. • furosemide 40 MG Oral Tab Take 40 mg by mouth 2 (two) times a day.      • omepr Length (cm) 14 cm 01/14/22 0849   Wound Width (cm) 16 cm 01/14/22 0849   Wound Surface Area (cm^2) 224 cm^2 01/14/22 0849   Wound Depth (cm) 0.2 cm 01/14/22 0849   Wound Volume (cm^3) 44.8 cm^3 01/14/22 0849   Wound Healing % -276 01/14/22 0849   Margins W Tape 11/12/21 0940   Wound Length (cm) 0 cm 01/14/22 0854   Wound Width (cm) 0 cm 01/14/22 0854   Wound Surface Area (cm^2) 0 cm^2 01/14/22 0854   Wound Depth (cm) 0 cm 01/14/22 0854   Wound Volume (cm^3) 0 cm^3 01/14/22 0854   Wound Healing % 100 01/14/22 Dressings:     · Wash your hands with soap and water. Always wear gloves while changing dressings. Donot touch wound / vicente-wound skin with un-gloved hands.  Remove old dressing, discard and place into trash.    ·  Cleansing: Cleanse with warm water and the potatoes, spinach, broccoli, cauliflower, [place] sprouts, cabbage     · Vitamin A: Dark green, leafy vegetables, orange or yellow vegetables, cantaloupe, fortified dairy products, liver, fortified cereals     · Zinc: Fortified cereals, red meats, seafood results to the patient/family/caregiver and care coordination with the patient's other providers. Followup: Return in about 2 weeks (around 1/28/2022) for Wound followup.     Comment: Please note this report has been produced using speech recognition sof

## 2022-01-14 NOTE — PATIENT INSTRUCTIONS
Patient Instructions and orders for Wound Care        Wound Cleaning and Dressings:     · Wash your hands with soap and water. Always wear gloves while changing dressings. Donot touch wound / vicente-wound skin with un-gloved hands.  Remove old dressing, disca Citrus fruits and juices, strawberries, tomatoes, tomato juice, peppers, baked potatoes, spinach, broccoli, cauliflower, [place] sprouts, cabbage     · Vitamin A: Dark green, leafy vegetables, orange or yellow vegetables, cantaloupe, fortified dairy produ

## 2022-01-14 NOTE — PROGRESS NOTES
Weekly Wound Education Note    Teaching Provided To: Patient  Training Topics: Dressing; Discharge instructions; Compression;Cleasing and general instructions;Edema control  Training Method: Explain/Verbal  Training Response: Patient responds and understands

## 2022-01-28 ENCOUNTER — OFFICE VISIT (OUTPATIENT)
Dept: WOUND CARE | Facility: HOSPITAL | Age: 77
End: 2022-01-28
Attending: INTERNAL MEDICINE
Payer: MEDICARE

## 2022-01-28 VITALS
TEMPERATURE: 98 F | DIASTOLIC BLOOD PRESSURE: 78 MMHG | HEART RATE: 69 BPM | RESPIRATION RATE: 18 BRPM | SYSTOLIC BLOOD PRESSURE: 138 MMHG

## 2022-01-28 DIAGNOSIS — L97.919 CHRONIC VENOUS HYPERTENSION (IDIOPATHIC) WITH ULCER AND INFLAMMATION OF BILATERAL LOWER EXTREMITY (HCC): ICD-10-CM

## 2022-01-28 DIAGNOSIS — L97.929 CHRONIC VENOUS HYPERTENSION (IDIOPATHIC) WITH ULCER AND INFLAMMATION OF BILATERAL LOWER EXTREMITY (HCC): ICD-10-CM

## 2022-01-28 DIAGNOSIS — L97.212 NON-PRESSURE CHRONIC ULCER OF RIGHT CALF WITH FAT LAYER EXPOSED (HCC): Primary | ICD-10-CM

## 2022-01-28 DIAGNOSIS — L97.222 NON-PRESSURE CHRONIC ULCER OF LEFT CALF WITH FAT LAYER EXPOSED (HCC): ICD-10-CM

## 2022-01-28 DIAGNOSIS — I87.333 CHRONIC VENOUS HYPERTENSION (IDIOPATHIC) WITH ULCER AND INFLAMMATION OF BILATERAL LOWER EXTREMITY (HCC): ICD-10-CM

## 2022-01-28 DIAGNOSIS — I89.0 LYMPHEDEMA: ICD-10-CM

## 2022-01-28 PROCEDURE — 99214 OFFICE O/P EST MOD 30 MIN: CPT

## 2022-01-28 NOTE — PROGRESS NOTES
Weekly Wound Education Note    Teaching Provided To: Patient  Training Topics: Compression;Cleasing and general instructions; Discharge instructions;Dressing;Edema control  Training Method: Explain/Verbal  Training Response: Patient responds and understands

## 2022-01-28 NOTE — PROGRESS NOTES
Igor 36 NOTE  Christine De La Rosa MD  1/28/2022    Chief Complaint:   Patient presents with:  Wound Care: Patient is here for a wound care follow up. He complains of constant pain that is 7/10.        HPI:   Subjective   CANDICE Velez is a Take 10 mEq by mouth 2 (two) times daily. • Pravastatin Sodium 10 MG Oral Tab Take 10 tablets by mouth daily.      • POTASSIUM CHLORIDE ER 10 MEQ Oral Tab CR TAKE 1 TABLET(10 MEQ) BY MOUTH TWICE DAILY 180 tablet 0   • POTASSIUM CHLORIDE ER 10 MEQ Oral T Leg Left; Lower (Active)   Wound Image     01/28/22 0845   Drainage Amount Copious 01/28/22 0845   Drainage Description Serous; Yellow;Green 01/28/22 0845   Treatments Vashe 01/28/22 0845   Dressing Kerlix roll 11/12/21 0936   Wound Length (cm) 20.7 cm 01/28 ([address]     Presbyopia     Myopia     Malignant neoplasm of prostate ([address]     Ulcer of lower extremity ([address]     Seasonal allergic rhinitis due to pollen     Senile nuclear sclerosis     Routine medical exam     Regular astigmatism     Primary localized ost wet. If wraps are too tight as indicated        By pain, numbness/tingling or discoloration of toes remove wrap completely       and call the   wound center.         Miscellaneous Instructions:  · Supplement with a daily multivitamin   · Low salt diet  · I Patient verbalizes understanding. Patient is notified to call with any questions, complications, allergies, or worsening or changing symptoms. Patient is to call with any side effects or complications as a result of the treatments today.       DOCUMENTATIO

## 2022-01-28 NOTE — PATIENT INSTRUCTIONS
Patient Instructions and orders for Wound Care        · Start more absorptive dressings. · Recommend surgical debridement- when tolerated. · Recommend compression wrap - when tolerated.    · Stressed importance of offloading wounded area and elevating l below  · Elevate extremities at all times when sitting / laying down.     DIETARY MODIFICATIONS TO HELP WITH WOUND HEALING:     · Protein: Meats, beans, eggs, milk and yogurt particularly Greek yogurt), tofu, soy nuts, soy protein products     · Vitamin C: For information on Fall & Injury Prevention, visit: [email or web address] OR  [email or web address] OR  https://www.cdc.gov/steadi/patient.html

## 2022-02-01 ENCOUNTER — TELEPHONE (OUTPATIENT)
Dept: WOUND CARE | Facility: HOSPITAL | Age: 77
End: 2022-02-01

## 2022-02-01 NOTE — TELEPHONE ENCOUNTER
Spoke to Gopi Woodard from [address] [phone] - She states pt had pain when removing hydrofera transfer dressing, she notes that she soaked the dressings in saline. Molly changed dressing - continuing to use hydrofera transfer and will be seeing patient tomorrow. Provider aware.

## 2022-02-11 ENCOUNTER — APPOINTMENT (OUTPATIENT)
Dept: WOUND CARE | Facility: HOSPITAL | Age: 77
End: 2022-02-11
Attending: INTERNAL MEDICINE
Payer: MEDICARE

## 2022-03-04 ENCOUNTER — OFFICE VISIT (OUTPATIENT)
Dept: WOUND CARE | Facility: HOSPITAL | Age: 77
End: 2022-03-04
Attending: NURSE PRACTITIONER
Payer: MEDICARE

## 2022-03-04 VITALS
TEMPERATURE: 98 F | HEART RATE: 60 BPM | RESPIRATION RATE: 14 BRPM | SYSTOLIC BLOOD PRESSURE: 102 MMHG | DIASTOLIC BLOOD PRESSURE: 50 MMHG

## 2022-03-04 DIAGNOSIS — G89.29 OTHER CHRONIC PAIN: ICD-10-CM

## 2022-03-04 DIAGNOSIS — L97.929 CHRONIC VENOUS HYPERTENSION (IDIOPATHIC) WITH ULCER AND INFLAMMATION OF BILATERAL LOWER EXTREMITY (HCC): ICD-10-CM

## 2022-03-04 DIAGNOSIS — L97.919 CHRONIC VENOUS HYPERTENSION (IDIOPATHIC) WITH ULCER AND INFLAMMATION OF BILATERAL LOWER EXTREMITY (HCC): ICD-10-CM

## 2022-03-04 DIAGNOSIS — L97.222 NON-PRESSURE CHRONIC ULCER OF LEFT CALF WITH FAT LAYER EXPOSED (HCC): ICD-10-CM

## 2022-03-04 DIAGNOSIS — I87.333 CHRONIC VENOUS HYPERTENSION (IDIOPATHIC) WITH ULCER AND INFLAMMATION OF BILATERAL LOWER EXTREMITY (HCC): ICD-10-CM

## 2022-03-04 DIAGNOSIS — L97.212 NON-PRESSURE CHRONIC ULCER OF RIGHT CALF WITH FAT LAYER EXPOSED (HCC): Primary | ICD-10-CM

## 2022-03-04 PROCEDURE — 99215 OFFICE O/P EST HI 40 MIN: CPT | Performed by: NURSE PRACTITIONER

## 2022-03-04 NOTE — PROGRESS NOTES
.Weekly Wound Education Note    Teaching Provided To: Patient  Training Topics: Dressing; Discharge instructions; Compression;Edema control;Cleasing and general instructions  Training Method: Explain/Verbal;Written  Training Response: Patient responds and understands           Baby diapers, calamine unna boot to both legs.

## 2022-03-18 ENCOUNTER — OFFICE VISIT (OUTPATIENT)
Dept: WOUND CARE | Facility: HOSPITAL | Age: 77
End: 2022-03-18
Attending: NURSE PRACTITIONER
Payer: MEDICARE

## 2022-03-18 VITALS
TEMPERATURE: 97 F | RESPIRATION RATE: 18 BRPM | SYSTOLIC BLOOD PRESSURE: 114 MMHG | DIASTOLIC BLOOD PRESSURE: 63 MMHG | HEART RATE: 73 BPM

## 2022-03-18 DIAGNOSIS — L97.212 NON-PRESSURE CHRONIC ULCER OF RIGHT CALF WITH FAT LAYER EXPOSED (HCC): Primary | ICD-10-CM

## 2022-03-18 DIAGNOSIS — I87.333 CHRONIC VENOUS HYPERTENSION (IDIOPATHIC) WITH ULCER AND INFLAMMATION OF BILATERAL LOWER EXTREMITY (HCC): ICD-10-CM

## 2022-03-18 DIAGNOSIS — G89.29 OTHER CHRONIC PAIN: ICD-10-CM

## 2022-03-18 DIAGNOSIS — R60.0 LOCALIZED EDEMA: ICD-10-CM

## 2022-03-18 DIAGNOSIS — L97.929 CHRONIC VENOUS HYPERTENSION (IDIOPATHIC) WITH ULCER AND INFLAMMATION OF BILATERAL LOWER EXTREMITY (HCC): ICD-10-CM

## 2022-03-18 DIAGNOSIS — L97.222 NON-PRESSURE CHRONIC ULCER OF LEFT CALF WITH FAT LAYER EXPOSED (HCC): ICD-10-CM

## 2022-03-18 DIAGNOSIS — L08.9 INFECTED WOUND: ICD-10-CM

## 2022-03-18 DIAGNOSIS — L97.919 CHRONIC VENOUS HYPERTENSION (IDIOPATHIC) WITH ULCER AND INFLAMMATION OF BILATERAL LOWER EXTREMITY (HCC): ICD-10-CM

## 2022-03-18 DIAGNOSIS — T14.8XXA INFECTED WOUND: ICD-10-CM

## 2022-03-18 DIAGNOSIS — I89.0 LYMPHEDEMA: ICD-10-CM

## 2022-03-18 PROCEDURE — 87147 CULTURE TYPE IMMUNOLOGIC: CPT | Performed by: INTERNAL MEDICINE

## 2022-03-18 PROCEDURE — 87070 CULTURE OTHR SPECIMN AEROBIC: CPT | Performed by: INTERNAL MEDICINE

## 2022-03-18 PROCEDURE — 99214 OFFICE O/P EST MOD 30 MIN: CPT

## 2022-03-18 PROCEDURE — 87077 CULTURE AEROBIC IDENTIFY: CPT | Performed by: INTERNAL MEDICINE

## 2022-03-18 PROCEDURE — 87205 SMEAR GRAM STAIN: CPT | Performed by: INTERNAL MEDICINE

## 2022-03-18 PROCEDURE — 87186 SC STD MICRODIL/AGAR DIL: CPT | Performed by: INTERNAL MEDICINE

## 2022-03-18 RX ORDER — SODIUM HYPOCHLORITE 1.25 MG/ML
SOLUTION TOPICAL
Qty: 1000 ML | Refills: 3 | Status: SHIPPED | OUTPATIENT
Start: 2022-03-18

## 2022-03-18 NOTE — PROGRESS NOTES
Weekly Wound Education Note    Teaching Provided To: Patient  Training Topics: Cleasing and general instructions;Dressing; Discharge instructions  Training Method: Explain/Verbal  Training Response: Refused information;Reinforcement needed        Notes: Not improving. Pt removed compression wraps and returned to xeroform dressings and ACE wraps - pt continue to refuse debridement of wounds, any other dressings recommeneded, or compression wraps. Pt states he is seeing PCP tomorrow to discuss other concerns. Continue with xeroform, ABD pad, kerlix, ACE wraps. Daksins ordered today. Wounds cultured today.

## 2022-03-21 NOTE — PROGRESS NOTES
Spoke to patient - went over culture results and new orders for topical and oral antibiotics. He verbalizes understanding.

## 2022-04-01 ENCOUNTER — APPOINTMENT (OUTPATIENT)
Dept: WOUND CARE | Facility: HOSPITAL | Age: 77
End: 2022-04-01
Attending: NURSE PRACTITIONER
Payer: MEDICARE

## 2022-04-15 ENCOUNTER — OFFICE VISIT (OUTPATIENT)
Dept: WOUND CARE | Facility: HOSPITAL | Age: 77
End: 2022-04-15
Attending: NURSE PRACTITIONER
Payer: MEDICARE

## 2022-04-15 ENCOUNTER — HOSPITAL ENCOUNTER (OUTPATIENT)
Dept: LAB | Facility: HOSPITAL | Age: 77
Discharge: HOME | End: 2022-04-15
Attending: INTERNAL MEDICINE
Payer: MEDICARE

## 2022-04-15 VITALS
SYSTOLIC BLOOD PRESSURE: 105 MMHG | HEART RATE: 89 BPM | DIASTOLIC BLOOD PRESSURE: 68 MMHG | RESPIRATION RATE: 18 BRPM | TEMPERATURE: 98 F

## 2022-04-15 DIAGNOSIS — N17.9 ACUTE KIDNEY INJURY (HCC): ICD-10-CM

## 2022-04-15 DIAGNOSIS — I87.333 CHRONIC VENOUS HYPERTENSION (IDIOPATHIC) WITH ULCER AND INFLAMMATION OF BILATERAL LOWER EXTREMITY (HCC): ICD-10-CM

## 2022-04-15 DIAGNOSIS — L97.222 NON-PRESSURE CHRONIC ULCER OF LEFT CALF WITH FAT LAYER EXPOSED (HCC): ICD-10-CM

## 2022-04-15 DIAGNOSIS — L97.929 CHRONIC VENOUS HYPERTENSION (IDIOPATHIC) WITH ULCER AND INFLAMMATION OF BILATERAL LOWER EXTREMITY (HCC): ICD-10-CM

## 2022-04-15 DIAGNOSIS — T14.8XXA INFECTED WOUND: ICD-10-CM

## 2022-04-15 DIAGNOSIS — L08.9 INFECTED WOUND: ICD-10-CM

## 2022-04-15 DIAGNOSIS — D72.829 LEUKOCYTOSIS, UNSPECIFIED TYPE: ICD-10-CM

## 2022-04-15 DIAGNOSIS — L97.919 CHRONIC VENOUS HYPERTENSION (IDIOPATHIC) WITH ULCER AND INFLAMMATION OF BILATERAL LOWER EXTREMITY (HCC): ICD-10-CM

## 2022-04-15 DIAGNOSIS — L97.212 NON-PRESSURE CHRONIC ULCER OF RIGHT CALF WITH FAT LAYER EXPOSED (HCC): ICD-10-CM

## 2022-04-15 DIAGNOSIS — L97.212 NON-PRESSURE CHRONIC ULCER OF RIGHT CALF WITH FAT LAYER EXPOSED (HCC): Primary | ICD-10-CM

## 2022-04-15 LAB
ALBUMIN SERPL-MCNC: 2.5 G/DL (ref 3.4–5)
ALBUMIN/GLOB SERPL: 0.5 {RATIO} (ref 1–2)
ALP LIVER SERPL-CCNC: 72 U/L
ALT SERPL-CCNC: 17 U/L
ANION GAP SERPL CALC-SCNC: 5 MMOL/L (ref 0–18)
AST SERPL-CCNC: 10 U/L (ref 15–37)
BASOPHILS # BLD AUTO: 0.05 X10(3) UL (ref 0–0.2)
BASOPHILS NFR BLD AUTO: 0.3 %
BILIRUB SERPL-MCNC: 0.3 MG/DL (ref 0.1–2)
BUN BLD-MCNC: 34 MG/DL (ref 7–18)
CALCIUM BLD-MCNC: 10.4 MG/DL (ref 8.5–10.1)
CHLORIDE SERPL-SCNC: 106 MMOL/L (ref 98–112)
CO2 SERPL-SCNC: 25 MMOL/L (ref 21–32)
CREAT BLD-MCNC: 1.34 MG/DL
CRP SERPL-MCNC: 8.66 MG/DL (ref ?–0.3)
EOSINOPHIL # BLD AUTO: 0.07 X10(3) UL (ref 0–0.7)
EOSINOPHIL NFR BLD AUTO: 0.4 %
ERYTHROCYTE [DISTWIDTH] IN BLOOD BY AUTOMATED COUNT: 17 %
ERYTHROCYTE [SEDIMENTATION RATE] IN BLOOD: 66 MM/HR
FASTING STATUS PATIENT QL REPORTED: NO
GLOBULIN PLAS-MCNC: 5.1 G/DL (ref 2.8–4.4)
GLUCOSE BLD-MCNC: 105 MG/DL (ref 70–99)
HCT VFR BLD AUTO: 33.9 %
HGB BLD-MCNC: 10.6 G/DL
IMM GRANULOCYTES # BLD AUTO: 0.08 X10(3) UL (ref 0–1)
IMM GRANULOCYTES NFR BLD: 0.5 %
LYMPHOCYTES # BLD AUTO: 0.72 X10(3) UL (ref 1–4)
LYMPHOCYTES NFR BLD AUTO: 4.1 %
MCH RBC QN AUTO: 28.9 PG (ref 26–34)
MCHC RBC AUTO-ENTMCNC: 31.3 G/DL (ref 31–37)
MCV RBC AUTO: 92.4 FL
MONOCYTES # BLD AUTO: 1.1 X10(3) UL (ref 0.1–1)
MONOCYTES NFR BLD AUTO: 6.3 %
NEUTROPHILS # BLD AUTO: 15.38 X10 (3) UL (ref 1.5–7.7)
NEUTROPHILS # BLD AUTO: 15.38 X10(3) UL (ref 1.5–7.7)
NEUTROPHILS NFR BLD AUTO: 88.4 %
OSMOLALITY SERPL CALC.SUM OF ELEC: 290 MOSM/KG (ref 275–295)
PLATELET # BLD AUTO: 251 10(3)UL (ref 150–450)
POTASSIUM SERPL-SCNC: 4.5 MMOL/L (ref 3.5–5.1)
PROT SERPL-MCNC: 7.6 G/DL (ref 6.4–8.2)
RBC # BLD AUTO: 3.67 X10(6)UL
SODIUM SERPL-SCNC: 136 MMOL/L (ref 136–145)
WBC # BLD AUTO: 17.4 X10(3) UL (ref 4–11)

## 2022-04-15 PROCEDURE — 99214 OFFICE O/P EST MOD 30 MIN: CPT

## 2022-04-15 PROCEDURE — 85025 COMPLETE CBC W/AUTO DIFF WBC: CPT

## 2022-04-15 PROCEDURE — 97598 DBRDMT OPN WND ADDL 20CM/<: CPT | Performed by: NURSE PRACTITIONER

## 2022-04-15 PROCEDURE — 97597 DBRDMT OPN WND 1ST 20 CM/<: CPT | Performed by: NURSE PRACTITIONER

## 2022-04-15 PROCEDURE — 36415 COLL VENOUS BLD VENIPUNCTURE: CPT

## 2022-04-15 PROCEDURE — 85652 RBC SED RATE AUTOMATED: CPT

## 2022-04-15 PROCEDURE — 86140 C-REACTIVE PROTEIN: CPT

## 2022-04-15 PROCEDURE — 80053 COMPREHEN METABOLIC PANEL: CPT

## 2022-04-15 NOTE — PROGRESS NOTES
Patient ID: Zach Hurtado is a 67year old male. Debridement   Consent obtained? verbal  Consent given by: patient  Risks discussed?  procedural risks discussed  Performed by: provider  Debridement type: selective  Pain control: lidocaine 4%  Pre-debridement measurements  Length (cm): 24.5  Width (cm): 33.9  Depth (cm): 0.2  Surface Area (cm^2): 830.55  Volume (cm^3): 166.11    Post-debridement measurements  Length (cm): 24.5  Width (cm): 33.9  Depth (cm): 0.2  Percent debrided: 15%  Surface Area (cm^2): 830.55  Area debrided (cm^2): 124.58  Volume (cm^3): 166.11  Devitalized tissue debrided: biofilm, exudate, fibrin, necrotic debris and slough  Instrument(s) utilized: curette  Bleeding: small  Hemostasis obtained with: pressure  Procedural pain (0-10): 9  Post-procedural pain: 3   Response to treatment: procedure was not tolerated well

## 2022-04-15 NOTE — PROGRESS NOTES
Weekly Wound Education Note    Teaching Provided To: Patient  Training Topics: Cleasing and general instructions; Compression; Discharge instructions;Dressing;Edema control  Training Method: Explain/Verbal  Training Response: Reinforcement needed        Notes: Stable. Continue gentamycin, xerorfom, ABD pad, kerlix, ACE wrap. Provider recommeneded pt make appoitnment with surgeon for debridement in OR for complete debridement of both leg wounds.

## 2022-04-15 NOTE — PROGRESS NOTES
Spoke to patient went over lab results, per provider highly advised to go to ER may need IV antibiotics. Pt hesitant - he states he will be possibly going tomorrow. Explained the importance of going to ER to be evaluated.

## 2022-04-16 ENCOUNTER — OFFICE VISIT (OUTPATIENT)
Dept: OPTOMETRY | Age: 77
End: 2022-04-16

## 2022-04-16 DIAGNOSIS — Z86.69 HISTORY OF RETINAL DETACHMENT: ICD-10-CM

## 2022-04-16 DIAGNOSIS — H25.813 COMBINED FORMS OF AGE-RELATED CATARACT OF BOTH EYES: Primary | ICD-10-CM

## 2022-04-16 DIAGNOSIS — H52.4 PRESBYOPIA: ICD-10-CM

## 2022-04-16 DIAGNOSIS — H52.12 MYOPIA, LEFT EYE: ICD-10-CM

## 2022-04-16 DIAGNOSIS — H52.222 REGULAR ASTIGMATISM OF LEFT EYE: ICD-10-CM

## 2022-04-16 PROCEDURE — 92014 COMPRE OPH EXAM EST PT 1/>: CPT | Performed by: OPTOMETRIST

## 2022-04-16 PROCEDURE — 92015 DETERMINE REFRACTIVE STATE: CPT | Performed by: OPTOMETRIST

## 2022-04-16 PROCEDURE — 92310 CONTACT LENS FITTING OU: CPT | Performed by: OPTOMETRIST

## 2022-04-16 ASSESSMENT — VISUAL ACUITY
OD_PH_CC: NO IMPROVEMENT
OS_CC: J2 @ 18
OD_CC: CF
OS_CC: 20/50
METHOD: SNELLEN - LINEAR
OS_CC+: -2
OS_PH_CC: 20/40
OS_PH_CC+: -2

## 2022-04-16 ASSESSMENT — TONOMETRY
IOP_METHOD: APPLANATION
OD_IOP_MMHG: 7
OS_IOP_MMHG: 10

## 2022-04-16 ASSESSMENT — REFRACTION_MANIFEST
OS_SPHERE: -7.25
OD_SPHERE: BALANCE
OS_ADD: +2.50
OS_AXIS: 075
OS_CYLINDER: +1.75

## 2022-04-16 ASSESSMENT — EXTERNAL EXAM - RIGHT EYE: OD_EXAM: NORMAL

## 2022-04-16 ASSESSMENT — REFRACTION_CURRENTRX
OD_BRAND: OPTIMUM COMFORT
OS_BASECURVE: 7.70
OS_DIAMETER: 9.8
OD_SPHERE: -5.75
OD_BASECURVE: 7.54
OD_DIAMETER: 9.6

## 2022-04-16 ASSESSMENT — KERATOMETRY
OS_K1POWER_DIOPTERS: 43.00
OS_K2POWER_DIOPTERS: 45.25
OS_AXISANGLE_DEGREES: 090
OS_AXISANGLE2_DEGREES: 180

## 2022-04-16 ASSESSMENT — SLIT LAMP EXAM - LIDS
COMMENTS: NORMAL
COMMENTS: NORMAL

## 2022-04-16 ASSESSMENT — CONF VISUAL FIELD
OD_INFERIOR_TEMPORAL_RESTRICTION: 1
OS_NORMAL: 1
OD_SUPERIOR_TEMPORAL_RESTRICTION: 1

## 2022-04-16 ASSESSMENT — CUP TO DISC RATIO: OS_RATIO: 0.2

## 2022-04-16 ASSESSMENT — EXTERNAL EXAM - LEFT EYE: OS_EXAM: NORMAL

## 2022-04-18 ENCOUNTER — TELEPHONE (OUTPATIENT)
Dept: OPTOMETRY | Age: 77
End: 2022-04-18

## 2022-04-25 ENCOUNTER — OFFICE VISIT (OUTPATIENT)
Dept: OPTOMETRY | Age: 77
End: 2022-04-25

## 2022-04-25 DIAGNOSIS — H52.4 PRESBYOPIA: ICD-10-CM

## 2022-04-25 DIAGNOSIS — H52.12 MYOPIA, LEFT EYE: Primary | ICD-10-CM

## 2022-04-25 PROCEDURE — X1094 NO CHARGE VISIT: HCPCS | Performed by: OPTOMETRIST

## 2022-04-25 ASSESSMENT — REFRACTION_CURRENTRX
OS_CYLINDER: -5.75
OS_BASECURVE: 7.70
OS_ADD: +2.50
OS_SPHERE: 9.7

## 2022-05-13 ENCOUNTER — OFFICE VISIT (OUTPATIENT)
Dept: WOUND CARE | Facility: HOSPITAL | Age: 77
End: 2022-05-13
Attending: NURSE PRACTITIONER
Payer: MEDICARE

## 2022-05-13 VITALS
HEART RATE: 62 BPM | RESPIRATION RATE: 18 BRPM | WEIGHT: 237 LBS | DIASTOLIC BLOOD PRESSURE: 75 MMHG | HEIGHT: 73 IN | TEMPERATURE: 99 F | SYSTOLIC BLOOD PRESSURE: 123 MMHG | BODY MASS INDEX: 31.41 KG/M2

## 2022-05-13 DIAGNOSIS — L97.222 NON-PRESSURE CHRONIC ULCER OF LEFT CALF WITH FAT LAYER EXPOSED (HCC): ICD-10-CM

## 2022-05-13 DIAGNOSIS — I89.0 LYMPHEDEMA: ICD-10-CM

## 2022-05-13 DIAGNOSIS — A49.02 MRSA (METHICILLIN RESISTANT STAPHYLOCOCCUS AUREUS) INFECTION: ICD-10-CM

## 2022-05-13 DIAGNOSIS — B96.5 PSEUDOMONAS (AERUGINOSA) (MALLEI) (PSEUDOMALLEI) AS THE CAUSE OF DISEASES CLASSIFIED ELSEWHERE: ICD-10-CM

## 2022-05-13 DIAGNOSIS — L97.212 NON-PRESSURE CHRONIC ULCER OF RIGHT CALF WITH FAT LAYER EXPOSED (HCC): Primary | ICD-10-CM

## 2022-05-13 DIAGNOSIS — L97.919 CHRONIC VENOUS HYPERTENSION (IDIOPATHIC) WITH ULCER AND INFLAMMATION OF BILATERAL LOWER EXTREMITY (HCC): ICD-10-CM

## 2022-05-13 DIAGNOSIS — I87.333 CHRONIC VENOUS HYPERTENSION (IDIOPATHIC) WITH ULCER AND INFLAMMATION OF BILATERAL LOWER EXTREMITY (HCC): ICD-10-CM

## 2022-05-13 DIAGNOSIS — L97.929 CHRONIC VENOUS HYPERTENSION (IDIOPATHIC) WITH ULCER AND INFLAMMATION OF BILATERAL LOWER EXTREMITY (HCC): ICD-10-CM

## 2022-05-13 PROCEDURE — 99214 OFFICE O/P EST MOD 30 MIN: CPT

## 2022-05-13 RX ORDER — SULFAMETHOXAZOLE AND TRIMETHOPRIM 800; 160 MG/1; MG/1
1 TABLET ORAL 2 TIMES DAILY
Qty: 28 TABLET | Refills: 0 | Status: SHIPPED | OUTPATIENT
Start: 2022-05-13 | End: 2022-05-27

## 2022-05-13 NOTE — PROGRESS NOTES
Weekly Wound Education Note    Teaching Provided To: Patient  Training Topics: Cleasing and general instructions; Discharge instructions;Dressing;Edema control; Compression  Training Method: Explain/Verbal  Training Response: Reinforcement needed        Notes: Patient continues to refuse debridement and compression wraps - provider dicussed/recommended pt see surgeon. Oral abx ordered today. Continue gentamycin, xeroform, ADB pads, ACE wraps.

## 2022-05-27 ENCOUNTER — PATIENT MESSAGE (OUTPATIENT)
Dept: WOUND CARE | Facility: HOSPITAL | Age: 77
End: 2022-05-27

## 2022-05-27 RX ORDER — SULFAMETHOXAZOLE AND TRIMETHOPRIM 400; 80 MG/1; MG/1
1 TABLET ORAL 2 TIMES DAILY
Qty: 28 TABLET | Refills: 0 | Status: SHIPPED | OUTPATIENT
Start: 2022-05-27 | End: 2022-06-10

## 2022-05-27 NOTE — TELEPHONE ENCOUNTER
SCRIPT SENT  Orders Placed This Encounter      sulfamethoxazole-trimethoprim (BACTRIM) 400-80 MG Oral Tab          Sig: Take 1 tablet by mouth 2 (two) times daily for 14 days.           Dispense:  28 tablet          Refill:  0    Devang James MD, 05/27/22, 5:01 PM

## 2022-06-10 ENCOUNTER — OFFICE VISIT (OUTPATIENT)
Dept: WOUND CARE | Facility: HOSPITAL | Age: 77
End: 2022-06-10
Attending: NURSE PRACTITIONER
Payer: MEDICARE

## 2022-06-10 VITALS
RESPIRATION RATE: 18 BRPM | TEMPERATURE: 99 F | DIASTOLIC BLOOD PRESSURE: 47 MMHG | SYSTOLIC BLOOD PRESSURE: 118 MMHG | HEART RATE: 58 BPM

## 2022-06-10 DIAGNOSIS — I89.0 LYMPHEDEMA: ICD-10-CM

## 2022-06-10 DIAGNOSIS — L97.929 CHRONIC VENOUS HYPERTENSION (IDIOPATHIC) WITH ULCER AND INFLAMMATION OF BILATERAL LOWER EXTREMITY (HCC): ICD-10-CM

## 2022-06-10 DIAGNOSIS — L97.919 CHRONIC VENOUS HYPERTENSION (IDIOPATHIC) WITH ULCER AND INFLAMMATION OF BILATERAL LOWER EXTREMITY (HCC): ICD-10-CM

## 2022-06-10 DIAGNOSIS — L97.222 NON-PRESSURE CHRONIC ULCER OF LEFT CALF WITH FAT LAYER EXPOSED (HCC): ICD-10-CM

## 2022-06-10 DIAGNOSIS — I87.333 CHRONIC VENOUS HYPERTENSION (IDIOPATHIC) WITH ULCER AND INFLAMMATION OF BILATERAL LOWER EXTREMITY (HCC): ICD-10-CM

## 2022-06-10 DIAGNOSIS — L97.212 NON-PRESSURE CHRONIC ULCER OF RIGHT CALF WITH FAT LAYER EXPOSED (HCC): Primary | ICD-10-CM

## 2022-06-10 PROCEDURE — 99214 OFFICE O/P EST MOD 30 MIN: CPT

## 2022-06-10 NOTE — PROGRESS NOTES
.Weekly Wound Education Note    Teaching Provided To: Patient  Training Topics: Discharge instructions;Dressing;Edema control;Cleasing and general instructions; Compression  Training Method: Explain/Verbal;Written  Training Response: Patient responds and understands        Notes: Wounds stable. Dakin's soak before redressing. Continue gentamycin ointment, xeroform, ABD pads, kerlix, and ACE wraps to both legs.

## 2022-06-24 ENCOUNTER — APPOINTMENT (OUTPATIENT)
Dept: WOUND CARE | Facility: HOSPITAL | Age: 77
End: 2022-06-24
Attending: NURSE PRACTITIONER
Payer: MEDICARE

## 2022-07-01 ENCOUNTER — APPOINTMENT (OUTPATIENT)
Dept: WOUND CARE | Facility: HOSPITAL | Age: 77
End: 2022-07-01
Attending: NURSE PRACTITIONER
Payer: MEDICARE

## 2022-07-11 ENCOUNTER — TELEPHONE (OUTPATIENT)
Dept: WOUND CARE | Facility: HOSPITAL | Age: 77
End: 2022-07-11

## 2022-07-11 RX ORDER — SULFAMETHOXAZOLE AND TRIMETHOPRIM 400; 80 MG/1; MG/1
1 TABLET ORAL DAILY
Qty: 28 TABLET | Refills: 0 | Status: SHIPPED | OUTPATIENT
Start: 2022-07-11 | End: 2022-08-08

## 2022-07-11 NOTE — TELEPHONE ENCOUNTER
Spoke with patient, informed him provider was made aware of his needing the bactrim refilled at the [facility] in [place]. He states he understands.

## 2022-07-15 ENCOUNTER — OFFICE VISIT (OUTPATIENT)
Dept: WOUND CARE | Facility: HOSPITAL | Age: 77
End: 2022-07-15
Attending: NURSE PRACTITIONER
Payer: MEDICARE

## 2022-07-15 VITALS
RESPIRATION RATE: 18 BRPM | HEART RATE: 55 BPM | TEMPERATURE: 98 F | WEIGHT: 243 LBS | BODY MASS INDEX: 32 KG/M2 | DIASTOLIC BLOOD PRESSURE: 69 MMHG | SYSTOLIC BLOOD PRESSURE: 124 MMHG

## 2022-07-15 DIAGNOSIS — I89.0 LYMPHEDEMA: ICD-10-CM

## 2022-07-15 DIAGNOSIS — L97.222 NON-PRESSURE CHRONIC ULCER OF LEFT CALF WITH FAT LAYER EXPOSED (HCC): ICD-10-CM

## 2022-07-15 DIAGNOSIS — L97.919 CHRONIC VENOUS HYPERTENSION (IDIOPATHIC) WITH ULCER AND INFLAMMATION OF BILATERAL LOWER EXTREMITY (HCC): ICD-10-CM

## 2022-07-15 DIAGNOSIS — I87.333 CHRONIC VENOUS HYPERTENSION (IDIOPATHIC) WITH ULCER AND INFLAMMATION OF BILATERAL LOWER EXTREMITY (HCC): ICD-10-CM

## 2022-07-15 DIAGNOSIS — L97.929 CHRONIC VENOUS HYPERTENSION (IDIOPATHIC) WITH ULCER AND INFLAMMATION OF BILATERAL LOWER EXTREMITY (HCC): ICD-10-CM

## 2022-07-15 DIAGNOSIS — L97.212 NON-PRESSURE CHRONIC ULCER OF RIGHT CALF WITH FAT LAYER EXPOSED (HCC): Primary | ICD-10-CM

## 2022-07-15 DIAGNOSIS — T14.8XXA INFECTED WOUND: ICD-10-CM

## 2022-07-15 DIAGNOSIS — L08.9 INFECTED WOUND: ICD-10-CM

## 2022-07-15 PROCEDURE — 99214 OFFICE O/P EST MOD 30 MIN: CPT

## 2022-07-15 PROCEDURE — 11042 DBRDMT SUBQ TIS 1ST 20SQCM/<: CPT | Performed by: INTERNAL MEDICINE

## 2022-07-15 PROCEDURE — 11045 DBRDMT SUBQ TISS EACH ADDL: CPT | Performed by: INTERNAL MEDICINE

## 2022-07-15 PROCEDURE — 11045 DBRDMT SUBQ TISS EACH ADDL: CPT | Performed by: NURSE PRACTITIONER

## 2022-07-15 PROCEDURE — 11042 DBRDMT SUBQ TIS 1ST 20SQCM/<: CPT | Performed by: NURSE PRACTITIONER

## 2022-07-15 NOTE — PROGRESS NOTES
Weekly Wound Education Note    Teaching Provided To: Patient  Training Topics: Cleasing and general instructions; Compression; Discharge instructions;Dressing;Edema control  Training Method: Explain/Verbal  Training Response: Reinforcement needed;Refused information        Notes: Pt continues to refuse compression wraps. Continue with gentamycin, xeroform, ABD pad, kerlix, ACE wraps BLE.

## 2022-07-15 NOTE — PROGRESS NOTES
Patient ID: Cipriano Sheridan is a 67year old male. Debridement   Wound 08/06/21 #2 left lower leg Venous Ulcer Leg Left; Lower    Consent obtained? verbal  Consent given by: patient  Risks discussed?  procedural risks discussed  Performed by: provider  Debridement type: surgical  Level of debridement: subcutaneous tissue  Pain control: lidocaine 4%  Pre-debridement measurements  Length (cm): 23.5  Width (cm): 32  Depth (cm): 0.2  Surface Area (cm^2): 752  Volume (cm^3): 150.4    Post-debridement measurements  Length (cm): 23.53  Width (cm): 32  Depth (cm): 0.4  Percent debrided: 30%  Surface Area (cm^2): 752.96  Area debrided (cm^2): 225.89  Volume (cm^3): 301.18  Tissue and other material debrided: subcutaneous tissue  Devitalized tissue debrided: biofilm, necrotic debris and slough  Instrument(s) utilized: curette  Bleeding: medium  Hemostasis obtained with: pressure  Procedural pain (0-10): 3  Post-procedural pain: 0   Response to treatment: procedure was tolerated well

## 2022-07-22 ENCOUNTER — APPOINTMENT (OUTPATIENT)
Dept: WOUND CARE | Facility: HOSPITAL | Age: 77
End: 2022-07-22
Attending: NURSE PRACTITIONER
Payer: MEDICARE

## 2022-07-29 ENCOUNTER — APPOINTMENT (OUTPATIENT)
Dept: WOUND CARE | Facility: HOSPITAL | Age: 77
End: 2022-07-29
Attending: NURSE PRACTITIONER
Payer: MEDICARE

## 2022-08-12 ENCOUNTER — OFFICE VISIT (OUTPATIENT)
Dept: WOUND CARE | Facility: HOSPITAL | Age: 77
End: 2022-08-12
Attending: NURSE PRACTITIONER
Payer: MEDICARE

## 2022-08-12 VITALS
TEMPERATURE: 98 F | HEART RATE: 58 BPM | RESPIRATION RATE: 17 BRPM | DIASTOLIC BLOOD PRESSURE: 50 MMHG | SYSTOLIC BLOOD PRESSURE: 115 MMHG

## 2022-08-12 DIAGNOSIS — I87.333 CHRONIC VENOUS HYPERTENSION (IDIOPATHIC) WITH ULCER AND INFLAMMATION OF BILATERAL LOWER EXTREMITY (HCC): ICD-10-CM

## 2022-08-12 DIAGNOSIS — L97.222 NON-PRESSURE CHRONIC ULCER OF LEFT CALF WITH FAT LAYER EXPOSED (HCC): ICD-10-CM

## 2022-08-12 DIAGNOSIS — L97.212 NON-PRESSURE CHRONIC ULCER OF RIGHT CALF WITH FAT LAYER EXPOSED (HCC): Primary | ICD-10-CM

## 2022-08-12 DIAGNOSIS — L97.919 CHRONIC VENOUS HYPERTENSION (IDIOPATHIC) WITH ULCER AND INFLAMMATION OF BILATERAL LOWER EXTREMITY (HCC): ICD-10-CM

## 2022-08-12 DIAGNOSIS — L97.929 CHRONIC VENOUS HYPERTENSION (IDIOPATHIC) WITH ULCER AND INFLAMMATION OF BILATERAL LOWER EXTREMITY (HCC): ICD-10-CM

## 2022-08-12 DIAGNOSIS — I89.0 LYMPHEDEMA: ICD-10-CM

## 2022-08-12 PROCEDURE — 11042 DBRDMT SUBQ TIS 1ST 20SQCM/<: CPT | Performed by: INTERNAL MEDICINE

## 2022-08-12 PROCEDURE — 11045 DBRDMT SUBQ TISS EACH ADDL: CPT | Performed by: INTERNAL MEDICINE

## 2022-08-12 PROCEDURE — 99214 OFFICE O/P EST MOD 30 MIN: CPT

## 2022-08-12 RX ORDER — SULFAMETHOXAZOLE AND TRIMETHOPRIM 400; 80 MG/1; MG/1
1 TABLET ORAL DAILY
Qty: 28 TABLET | Refills: 0 | Status: SHIPPED | OUTPATIENT
Start: 2022-08-12 | End: 2022-09-09

## 2022-08-12 NOTE — PROGRESS NOTES
Weekly Wound Education Note    Teaching Provided To: Patient  Training Topics: Cleasing and general instructions; Discharge instructions;Dressing  Training Method: Explain/Verbal  Training Response: Reinforcement needed;Refused information        Notes: Stable. Pt continues to refuse compression wraps. Continue gentamycin, xeroform, ABD pad, kerlix, ACE wrap.

## 2022-08-12 NOTE — PROGRESS NOTES
Patient ID: Hank Minaay is a 65year old male. Debridement   Wound 08/06/21 #1 Right lower leg Venous Ulcer Leg Right; Lower    Consent obtained? verbal  Consent given by: patient  Risks discussed? procedural risks discussed  Performed by: provider  Debridement type: surgical  Level of debridement: subcutaneous tissue  Pain control: lidocaine 4%  Pre-debridement measurements  Length (cm): 25  Width (cm): 33  Depth (cm): 0.2  Surface Area (cm^2): 825  Volume (cm^3): 165    Post-debridement measurements  Length (cm): 25  Width (cm): 33  Depth (cm): 0.3  Percent debrided: 25%  Surface Area (cm^2): 825  Area debrided (cm^2): 206.25  Volume (cm^3): 247.5  Tissue and other material debrided: subcutaneous tissue  Devitalized tissue debrided: biofilm, necrotic debris and slough  Instrument(s) utilized: curette  Bleeding: medium  Hemostasis obtained with: pressure  Procedural pain (0-10): 4  Post-procedural pain: 1   Response to treatment: procedure was tolerated well    Debridement   Wound 08/06/21 #2 left lower leg Venous Ulcer Leg Left; Lower    Consent obtained? verbal  Consent given by: patient  Risks discussed?  procedural risks discussed  Performed by: provider  Debridement type: surgical  Level of debridement: subcutaneous tissue  Pain control: lidocaine 4%  Pre-debridement measurements  Length (cm): 24  Width (cm): 34.5  Depth (cm): 0.2  Surface Area (cm^2): 828  Volume (cm^3): 165.6    Post-debridement measurements  Length (cm): 24  Width (cm): 35  Depth (cm): 0.3  Percent debrided: 30%  Surface Area (cm^2): 840  Area debrided (cm^2): 252  Volume (cm^3): 252  Tissue and other material debrided: subcutaneous tissue  Devitalized tissue debrided: biofilm, necrotic debris and slough  Instrument(s) utilized: curette  Bleeding: medium  Hemostasis obtained with: pressure  Procedural pain (0-10): 4  Post-procedural pain: 1   Response to treatment: procedure was tolerated well

## 2022-09-09 ENCOUNTER — OFFICE VISIT (OUTPATIENT)
Dept: WOUND CARE | Facility: HOSPITAL | Age: 77
End: 2022-09-09
Attending: NURSE PRACTITIONER
Payer: MEDICARE

## 2022-09-09 VITALS
RESPIRATION RATE: 18 BRPM | TEMPERATURE: 99 F | HEART RATE: 76 BPM | SYSTOLIC BLOOD PRESSURE: 134 MMHG | DIASTOLIC BLOOD PRESSURE: 72 MMHG

## 2022-09-09 DIAGNOSIS — L97.929 CHRONIC VENOUS HYPERTENSION (IDIOPATHIC) WITH ULCER AND INFLAMMATION OF BILATERAL LOWER EXTREMITY (HCC): ICD-10-CM

## 2022-09-09 DIAGNOSIS — I89.0 LYMPHEDEMA: ICD-10-CM

## 2022-09-09 DIAGNOSIS — L97.919 CHRONIC VENOUS HYPERTENSION (IDIOPATHIC) WITH ULCER AND INFLAMMATION OF BILATERAL LOWER EXTREMITY (HCC): ICD-10-CM

## 2022-09-09 DIAGNOSIS — T14.8XXA INFECTED WOUND: ICD-10-CM

## 2022-09-09 DIAGNOSIS — I87.333 CHRONIC VENOUS HYPERTENSION (IDIOPATHIC) WITH ULCER AND INFLAMMATION OF BILATERAL LOWER EXTREMITY (HCC): ICD-10-CM

## 2022-09-09 DIAGNOSIS — L97.212 NON-PRESSURE CHRONIC ULCER OF RIGHT CALF WITH FAT LAYER EXPOSED (HCC): Primary | ICD-10-CM

## 2022-09-09 DIAGNOSIS — L08.9 INFECTED WOUND: ICD-10-CM

## 2022-09-09 DIAGNOSIS — L97.222 NON-PRESSURE CHRONIC ULCER OF LEFT CALF WITH FAT LAYER EXPOSED (HCC): ICD-10-CM

## 2022-09-09 PROCEDURE — 11045 DBRDMT SUBQ TISS EACH ADDL: CPT | Performed by: INTERNAL MEDICINE

## 2022-09-09 PROCEDURE — 11042 DBRDMT SUBQ TIS 1ST 20SQCM/<: CPT | Performed by: INTERNAL MEDICINE

## 2022-09-09 RX ORDER — SULFAMETHOXAZOLE AND TRIMETHOPRIM 400; 80 MG/1; MG/1
1 TABLET ORAL DAILY
Qty: 28 TABLET | Refills: 0 | Status: SHIPPED | OUTPATIENT
Start: 2022-09-09 | End: 2022-10-07

## 2022-09-09 NOTE — PROGRESS NOTES
Weekly Wound Education Note    Teaching Provided To: Patient  Training Topics: Cleasing and general instructions; Compression; Discharge instructions;Dressing;Edema control  Training Method: Explain/Verbal  Training Response: Reinforcement needed        Notes: Stable. Continues to decline wrap in compression wraps. Continue gentamycin, xeroform, kerramax, kerlix, ACE wrap BLE. Daskins soak prior to dressings. Pt to get labs drawn, abx refilled today.

## 2022-09-09 NOTE — PROGRESS NOTES
Patient ID: Edouard Estrada is a 65year old male. Debridement   Wound 08/06/21 #1 Right lower leg Venous Ulcer Leg Right; Lower    Consent obtained? verbal  Consent given by: patient  Risks discussed? procedural risks discussed  Performed by: provider  Debridement type: surgical  Level of debridement: subcutaneous tissue  Pain control: lidocaine 4%  Pre-debridement measurements  Length (cm): 24.5  Width (cm): 29.5  Depth (cm): 0.2  Surface Area (cm^2): 722.75  Volume (cm^3): 144.55    Post-debridement measurements  Length (cm): 24.6  Width (cm): 29.6  Depth (cm): 0.3  Percent debrided: 50%  Surface Area (cm^2): 728.16  Area debrided (cm^2): 364.08  Volume (cm^3): 218.45  Tissue and other material debrided: subcutaneous tissue  Devitalized tissue debrided: biofilm, necrotic debris and slough  Instrument(s) utilized: curette  Bleeding: medium  Hemostasis obtained with: pressure  Procedural pain (0-10): 2  Post-procedural pain: 0   Response to treatment: procedure was tolerated well    Debridement   Wound 08/06/21 #2 left lower leg Venous Ulcer Leg Left; Lower    Consent obtained? verbal  Consent given by: patient  Risks discussed?  procedural risks discussed  Performed by: provider  Debridement type: surgical  Level of debridement: subcutaneous tissue  Pain control: lidocaine 4%  Pre-debridement measurements  Length (cm): 24  Width (cm): 34.2  Depth (cm): 0.2  Surface Area (cm^2): 820.8  Volume (cm^3): 164.16    Post-debridement measurements  Length (cm): 24  Width (cm): 34.2  Depth (cm): 0.3  Percent debrided: 50%  Surface Area (cm^2): 820.8  Area debrided (cm^2): 410.4  Volume (cm^3): 246.24  Tissue and other material debrided: subcutaneous tissue  Devitalized tissue debrided: biofilm, necrotic debris and slough  Instrument(s) utilized: curette  Bleeding: medium  Hemostasis obtained with: pressure  Procedural pain (0-10): 3  Post-procedural pain: 0   Response to treatment: procedure was tolerated well

## 2022-09-23 ENCOUNTER — APPOINTMENT (OUTPATIENT)
Dept: WOUND CARE | Facility: HOSPITAL | Age: 77
End: 2022-09-23
Attending: NURSE PRACTITIONER
Payer: MEDICARE

## 2022-09-23 ENCOUNTER — TELEPHONE (OUTPATIENT)
Dept: WOUND CARE | Facility: HOSPITAL | Age: 77
End: 2022-09-23

## 2022-09-23 RX ORDER — SODIUM HYPOCHLORITE 1.25 MG/ML
SOLUTION TOPICAL
Qty: 1000 ML | Refills: 5 | Status: SHIPPED | OUTPATIENT
Start: 2022-09-23

## 2022-09-23 NOTE — TELEPHONE ENCOUNTER
Orders Placed This Encounter      sodium hypochlorite 0.125 % External Solution          Sig: Apply on affected area  daily          Dispense:  1000 mL          Refill:  5    Paxton Griffin MD, 09/23/22, 10:09 AM

## 2022-09-23 NOTE — TELEPHONE ENCOUNTER
Patient states he needs Dakin's refilled. Informed patient the provider has been made aware and will be sending the refill to [place]. He states he understands.

## 2022-10-07 ENCOUNTER — OFFICE VISIT (OUTPATIENT)
Dept: WOUND CARE | Facility: HOSPITAL | Age: 77
End: 2022-10-07
Attending: NURSE PRACTITIONER
Payer: MEDICARE

## 2022-10-07 VITALS
HEART RATE: 71 BPM | RESPIRATION RATE: 17 BRPM | TEMPERATURE: 98 F | SYSTOLIC BLOOD PRESSURE: 131 MMHG | DIASTOLIC BLOOD PRESSURE: 69 MMHG

## 2022-10-07 DIAGNOSIS — T14.8XXA INFECTED WOUND: ICD-10-CM

## 2022-10-07 DIAGNOSIS — L08.9 INFECTED WOUND: ICD-10-CM

## 2022-10-07 DIAGNOSIS — I87.333 CHRONIC VENOUS HYPERTENSION (IDIOPATHIC) WITH ULCER AND INFLAMMATION OF BILATERAL LOWER EXTREMITY (HCC): ICD-10-CM

## 2022-10-07 DIAGNOSIS — I89.0 LYMPHEDEMA: ICD-10-CM

## 2022-10-07 DIAGNOSIS — L97.919 CHRONIC VENOUS HYPERTENSION (IDIOPATHIC) WITH ULCER AND INFLAMMATION OF BILATERAL LOWER EXTREMITY (HCC): ICD-10-CM

## 2022-10-07 DIAGNOSIS — L97.212 NON-PRESSURE CHRONIC ULCER OF RIGHT CALF WITH FAT LAYER EXPOSED (HCC): Primary | ICD-10-CM

## 2022-10-07 DIAGNOSIS — L97.222 NON-PRESSURE CHRONIC ULCER OF LEFT CALF WITH FAT LAYER EXPOSED (HCC): ICD-10-CM

## 2022-10-07 DIAGNOSIS — L97.929 CHRONIC VENOUS HYPERTENSION (IDIOPATHIC) WITH ULCER AND INFLAMMATION OF BILATERAL LOWER EXTREMITY (HCC): ICD-10-CM

## 2022-10-07 PROCEDURE — 11045 DBRDMT SUBQ TISS EACH ADDL: CPT | Performed by: INTERNAL MEDICINE

## 2022-10-07 PROCEDURE — 11045 DBRDMT SUBQ TISS EACH ADDL: CPT | Performed by: NURSE PRACTITIONER

## 2022-10-07 PROCEDURE — 11042 DBRDMT SUBQ TIS 1ST 20SQCM/<: CPT | Performed by: NURSE PRACTITIONER

## 2022-10-07 PROCEDURE — 11042 DBRDMT SUBQ TIS 1ST 20SQCM/<: CPT | Performed by: INTERNAL MEDICINE

## 2022-10-07 RX ORDER — SULFAMETHOXAZOLE AND TRIMETHOPRIM 400; 80 MG/1; MG/1
1 TABLET ORAL DAILY
Qty: 28 TABLET | Refills: 0 | Status: SHIPPED | OUTPATIENT
Start: 2022-10-07 | End: 2022-11-04

## 2022-10-07 NOTE — PROGRESS NOTES
Patient ID: Alanis Katz is a 65year old male. Debridement   Wound 08/06/21 #1 Right lower leg Venous Ulcer Leg Right; Lower    Consent obtained? verbal  Consent given by: patient  Risks discussed? procedural risks discussed  Performed by: provider  Debridement type: surgical  Level of debridement: subcutaneous tissue  Pain control: lidocaine 4%  Pre-debridement measurements  Length (cm): 22.5  Width (cm): 31.5  Depth (cm): 0.2  Surface Area (cm^2): 708.75  Volume (cm^3): 141.75    Post-debridement measurements  Length (cm): 22.5  Width (cm): 31.6  Depth (cm): 0.3  Percent debrided: 75%  Surface Area (cm^2): 100  Area debrided (cm^2): 533.25  Volume (cm^3): 213.3  Tissue and other material debrided: subcutaneous tissue  Devitalized tissue debrided: biofilm, fibrin, necrotic debris and slough  Instrument(s) utilized: curette  Bleeding: medium  Hemostasis obtained with: pressure  Procedural pain (0-10): 7  Post-procedural pain: 4   Response to treatment: procedure was tolerated well    Debridement   Wound 08/06/21 #2 left lower leg Venous Ulcer Leg Left; Lower    Consent obtained? verbal  Consent given by: patient  Risks discussed?  procedural risks discussed  Performed by: provider  Debridement type: surgical  Level of debridement: subcutaneous tissue  Pain control: lidocaine 4%  Pre-debridement measurements  Length (cm): 23  Width (cm): 34  Depth (cm): 0.2  Surface Area (cm^2): 782  Volume (cm^3): 156.4    Post-debridement measurements  Length (cm): 23.2  Width (cm): 34.1  Depth (cm): 0.3  Percent debrided: 50%  Surface Area (cm^2): 791.12  Area debrided (cm^2): 395.56  Volume (cm^3): 237.34  Tissue and other material debrided: subcutaneous tissue  Devitalized tissue debrided: biofilm, fibrin, necrotic debris and slough  Instrument(s) utilized: curette  Bleeding: medium  Hemostasis obtained with: pressure  Procedural pain (0-10): 7  Post-procedural pain: 4   Response to treatment: procedure was tolerated well

## 2022-10-07 NOTE — PROGRESS NOTES
Weekly Wound Education Note    Teaching Provided To: Patient  Training Topics: Cleasing and general instructions; Compression; Discharge instructions;Dressing;Edema control  Training Method: Explain/Verbal  Training Response: Reinforcement needed        Notes: Stable. Continuing to refuse compression wraps. Gentamycin, xeroform, kerramax, kerlix, ACE wraps.

## 2022-11-04 ENCOUNTER — OFFICE VISIT (OUTPATIENT)
Dept: WOUND CARE | Facility: HOSPITAL | Age: 77
End: 2022-11-04
Attending: NURSE PRACTITIONER
Payer: MEDICARE

## 2022-11-04 VITALS
HEART RATE: 67 BPM | DIASTOLIC BLOOD PRESSURE: 74 MMHG | TEMPERATURE: 98 F | SYSTOLIC BLOOD PRESSURE: 131 MMHG | RESPIRATION RATE: 18 BRPM

## 2022-11-04 DIAGNOSIS — L97.929 CHRONIC VENOUS HYPERTENSION (IDIOPATHIC) WITH ULCER AND INFLAMMATION OF BILATERAL LOWER EXTREMITY (HCC): ICD-10-CM

## 2022-11-04 DIAGNOSIS — L97.222 NON-PRESSURE CHRONIC ULCER OF LEFT CALF WITH FAT LAYER EXPOSED (HCC): ICD-10-CM

## 2022-11-04 DIAGNOSIS — L97.212 NON-PRESSURE CHRONIC ULCER OF RIGHT CALF WITH FAT LAYER EXPOSED (HCC): Primary | ICD-10-CM

## 2022-11-04 DIAGNOSIS — I89.0 LYMPHEDEMA: ICD-10-CM

## 2022-11-04 DIAGNOSIS — I87.333 CHRONIC VENOUS HYPERTENSION (IDIOPATHIC) WITH ULCER AND INFLAMMATION OF BILATERAL LOWER EXTREMITY (HCC): ICD-10-CM

## 2022-11-04 DIAGNOSIS — L08.9 INFECTED WOUND: ICD-10-CM

## 2022-11-04 DIAGNOSIS — L97.919 CHRONIC VENOUS HYPERTENSION (IDIOPATHIC) WITH ULCER AND INFLAMMATION OF BILATERAL LOWER EXTREMITY (HCC): ICD-10-CM

## 2022-11-04 DIAGNOSIS — T14.8XXA INFECTED WOUND: ICD-10-CM

## 2022-11-04 PROCEDURE — 11045 DBRDMT SUBQ TISS EACH ADDL: CPT | Performed by: INTERNAL MEDICINE

## 2022-11-04 PROCEDURE — 11042 DBRDMT SUBQ TIS 1ST 20SQCM/<: CPT | Performed by: INTERNAL MEDICINE

## 2022-11-04 RX ORDER — SULFAMETHOXAZOLE AND TRIMETHOPRIM 400; 80 MG/1; MG/1
1 TABLET ORAL DAILY
Qty: 28 TABLET | Refills: 0 | Status: SHIPPED | OUTPATIENT
Start: 2022-11-04 | End: 2022-12-02

## 2022-11-04 NOTE — PROGRESS NOTES
Weekly Wound Education Note    Teaching Provided To: Patient  Training Topics: Cleasing and general instructions; Compression; Discharge instructions;Dressing;Edema control  Training Method: Explain/Verbal  Training Response: Patient responds and understands; Reinforcement needed        Notes: Stable. Continue gentamycin, xeroform, kerramax, conforming gauze, tape, ACE wrap. Refused compression wraps this visit, offered spandagrip - continued to refuse. Educated on the importance of compression for wound healing.

## 2022-11-04 NOTE — PATIENT INSTRUCTIONS
Patient Instructions and orders for Wound Care        Serial surgical debridements as able   Continue topical gentamycin  And dakins soaks. Refill bactrim   Recommend compression wrap - when tolerated - refused due to pain. Stressed importance of offloading wounded area and elevating legs as much as possible. Wound Cleaning and Dressings:     Wash your hands with soap and water. Always wear gloves while changing dressings. Donot touch wound / vicente-wound skin with un-gloved hands. Remove old dressing, discard and place into trash. Cleansing: Cleanse with warm water and then use Dakins soak   Apply gentamycin - cover with xerform on wound bed - cover with abd pad- per pt request.  kerlix wrap   Shower with protection  Change dressing daily     Compression Therapy :  ace wrap- per pt request  Refused coflex-2. Compression Therapy Instructions:  1. Put on first thing in the morning and may remove at bedside. Okay to wear overnight      if comfortable. Do not let stockings roll up/down and kink. Hand wash stockings and      hang dry as needed. 2.  Avoid prolonged standing in one place. It is better to have your calf muscles moving       to pump fluid out of the legs. 3.  Elevate leg(s) above the level of the heart when sitting or as much as possible. 4.  Take your diuretics as directed by your provider. Do not skip doses or change doses      unless instructed to do so by your provider. 5. Do not get leg(s) with compression wrap wet. If wraps are too tight as indicated        By pain, numbness/tingling or discoloration of toes remove wrap completely       and call the   wound center. Miscellaneous Instructions:  Supplement with a daily multivitamin   Low salt diet  Increase protein intake / consider protein supplements - see below  Elevate extremities at all times when sitting / laying down.      DIETARY MODIFICATIONS TO HELP WITH WOUND HEALING:     Protein: Meats, beans, eggs, milk and yogurt particularly [place] yogurt), tofu, soy nuts, soy protein products     Vitamin C: Citrus fruits and juices, strawberries, tomatoes, tomato juice, peppers, baked potatoes, spinach, broccoli, cauliflower, [place] sprouts, cabbage     Vitamin A: Dark green, leafy vegetables, orange or yellow vegetables, cantaloupe, fortified dairy products, liver, fortified cereals     Zinc: Fortified cereals, red meats, seafood     Consider Donnie by [employer] (These are essential branch chain amino acids that help with tissue building and wound healing) and take 2 packets/day. you can order online at [employer] or [facility] Directions REMINDERS:     The treatment plan has been discussed at length with you and your provider. Follow all instructions carefully, it is very important. If you do not follow all instructions, you are at  risk of your wound not healing, infection, possible loss of limb and even end of life. Please call the clinic during regular business hours ( 7:30 AM - 5:30 PM) if you notice increased redness, warmth, pain or pus like drainage or start running a fever greater than 100.3. For after hour emergencies, please call your primary physician or go to the nearest emergency room.

## 2022-11-04 NOTE — PROGRESS NOTES
Patient ID: Deuce Pina is a 65year old male. Debridement   Wound 08/06/21 #1 Right lower leg Venous Ulcer Leg Right; Lower    Consent obtained? verbal  Consent given by: patient  Risks discussed? procedural risks discussed  Performed by: provider  Debridement type: surgical  Level of debridement: subcutaneous tissue  Pain control: lidocaine 4%  Pre-debridement measurements  Length (cm): 23  Width (cm): 33.5  Depth (cm): 0.2  Surface Area (cm^2): 770.5  Volume (cm^3): 154.1    Post-debridement measurements  Length (cm): 24  Width (cm): 33.6  Depth (cm): 0.3  Percent debrided: 50%  Surface Area (cm^2): 806.4  Area debrided (cm^2): 403.2  Volume (cm^3): 241.92  Tissue and other material debrided: subcutaneous tissue  Devitalized tissue debrided: biofilm, necrotic debris and slough  Instrument(s) utilized: curette  Bleeding: medium  Procedural pain (0-10): 4  Post-procedural pain: 4   Response to treatment: procedure was tolerated well    Debridement   Wound 08/06/21 #2 left lower leg Venous Ulcer Leg Left; Lower    Consent obtained? verbal  Consent given by: patient  Risks discussed? procedural risks discussed  Performed by: provider  Debridement type: surgical  Level of debridement: subcutaneous tissue  Pain control: lidocaine 4%  Pre-debridement measurements  Length (cm): 23.8  Width (cm): 33  Depth (cm): 0.2  Surface Area (cm^2): 973. 4  Volume (cm^3): 157.08    Post-debridement measurements  Length (cm): 24  Width (cm): 33.2  Depth (cm): 0.3  Percent debrided: 50%  Surface Area (cm^2): 796.8  Area debrided (cm^2): 398.4  Volume (cm^3): 239.04  Tissue and other material debrided: subcutaneous tissue  Devitalized tissue debrided: biofilm, necrotic debris and slough  Instrument(s) utilized: curette  Bleeding: medium  Hemostasis obtained with: pressure  Procedural pain (0-10): 4  Post-procedural pain: 4   Response to treatment: procedure was tolerated well

## 2022-12-02 ENCOUNTER — OFFICE VISIT (OUTPATIENT)
Dept: WOUND CARE | Facility: HOSPITAL | Age: 77
End: 2022-12-02
Attending: NURSE PRACTITIONER
Payer: MEDICARE

## 2022-12-02 VITALS
TEMPERATURE: 98 F | RESPIRATION RATE: 18 BRPM | HEART RATE: 61 BPM | DIASTOLIC BLOOD PRESSURE: 73 MMHG | SYSTOLIC BLOOD PRESSURE: 149 MMHG

## 2022-12-02 DIAGNOSIS — L97.222 NON-PRESSURE CHRONIC ULCER OF LEFT CALF WITH FAT LAYER EXPOSED (HCC): ICD-10-CM

## 2022-12-02 DIAGNOSIS — L97.919 CHRONIC VENOUS HYPERTENSION (IDIOPATHIC) WITH ULCER AND INFLAMMATION OF BILATERAL LOWER EXTREMITY (HCC): Primary | ICD-10-CM

## 2022-12-02 DIAGNOSIS — I87.333 CHRONIC VENOUS HYPERTENSION (IDIOPATHIC) WITH ULCER AND INFLAMMATION OF BILATERAL LOWER EXTREMITY (HCC): Primary | ICD-10-CM

## 2022-12-02 DIAGNOSIS — L97.212 NON-PRESSURE CHRONIC ULCER OF RIGHT CALF WITH FAT LAYER EXPOSED (HCC): ICD-10-CM

## 2022-12-02 DIAGNOSIS — I89.0 LYMPHEDEMA: ICD-10-CM

## 2022-12-02 DIAGNOSIS — L97.929 CHRONIC VENOUS HYPERTENSION (IDIOPATHIC) WITH ULCER AND INFLAMMATION OF BILATERAL LOWER EXTREMITY (HCC): Primary | ICD-10-CM

## 2022-12-02 PROCEDURE — 11045 DBRDMT SUBQ TISS EACH ADDL: CPT | Performed by: INTERNAL MEDICINE

## 2022-12-02 PROCEDURE — 11042 DBRDMT SUBQ TIS 1ST 20SQCM/<: CPT | Performed by: INTERNAL MEDICINE

## 2022-12-02 RX ORDER — GENTAMICIN SULFATE 1 MG/G
1 OINTMENT TOPICAL DAILY
Qty: 90 G | Refills: 5 | Status: SHIPPED | OUTPATIENT
Start: 2022-12-02

## 2022-12-02 RX ORDER — SULFAMETHOXAZOLE AND TRIMETHOPRIM 400; 80 MG/1; MG/1
1 TABLET ORAL DAILY
Qty: 28 TABLET | Refills: 0 | Status: SHIPPED | OUTPATIENT
Start: 2022-12-02 | End: 2022-12-30

## 2022-12-02 RX ORDER — SODIUM HYPOCHLORITE 1.25 MG/ML
SOLUTION TOPICAL
Qty: 1000 ML | Refills: 5 | Status: SHIPPED | OUTPATIENT
Start: 2022-12-02

## 2022-12-02 NOTE — PATIENT INSTRUCTIONS
Patient Instructions and orders for Wound Care        Serial surgical debridements as able   Continue topical gentamycin  And dakins soaks. Refill bactrim   Recommend compression wrap - when tolerated - refused due to pain. Stressed importance of offloading wounded area and elevating legs as much as possible. Wound Cleaning and Dressings:     Wash your hands with soap and water. Always wear gloves while changing dressings. Donot touch wound / vicente-wound skin with un-gloved hands. Remove old dressing, discard and place into trash. Cleansing: Cleanse with warm water and then use Dakins soak   Apply gentamycin - cover with xerform on wound bed - cover with abd pad- per pt request.  kerlix wrap   Shower with protection  Change dressing daily     Compression Therapy :  ace wrap- per pt request  Refused coflex-2. Compression Therapy Instructions:  1. Put on first thing in the morning and may remove at bedside. Okay to wear overnight      if comfortable. Do not let stockings roll up/down and kink. Hand wash stockings and      hang dry as needed. 2.  Avoid prolonged standing in one place. It is better to have your calf muscles moving       to pump fluid out of the legs. 3.  Elevate leg(s) above the level of the heart when sitting or as much as possible. 4.  Take your diuretics as directed by your provider. Do not skip doses or change doses      unless instructed to do so by your provider. 5. Do not get leg(s) with compression wrap wet. If wraps are too tight as indicated        By pain, numbness/tingling or discoloration of toes remove wrap completely       and call the   wound center. Miscellaneous Instructions:  Supplement with a daily multivitamin   Low salt diet  Increase protein intake / consider protein supplements - see below  Elevate extremities at all times when sitting / laying down.      DIETARY MODIFICATIONS TO HELP WITH WOUND HEALING:     Protein: Meats, beans, eggs, milk and yogurt particularly [place] yogurt), tofu, soy nuts, soy protein products     Vitamin C: Citrus fruits and juices, strawberries, tomatoes, tomato juice, peppers, baked potatoes, spinach, broccoli, cauliflower, [place] sprouts, cabbage     Vitamin A: Dark green, leafy vegetables, orange or yellow vegetables, cantaloupe, fortified dairy products, liver, fortified cereals     Zinc: Fortified cereals, red meats, seafood     Consider Donnie by [employer] (These are essential branch chain amino acids that help with tissue building and wound healing) and take 2 packets/day. you can order online at [employer] or "[employer] REMINDERS:     The treatment plan has been discussed at length with you and your provider. Follow all instructions carefully, it is very important. If you do not follow all instructions, you are at  risk of your wound not healing, infection, possible loss of limb and even end of life. Please call the clinic during regular business hours ( 7:30 AM - 5:30 PM) if you notice increased redness, warmth, pain or pus like drainage or start running a fever greater than 100.3. For after hour emergencies, please call your primary physician or go to the nearest emergency room.

## 2022-12-02 NOTE — PROGRESS NOTES
Patient ID: Deuce Pina is a 65year old male. Debridement   Wound 08/06/21 #1 Right lower leg Venous Ulcer Leg Right; Lower    Consent obtained? verbal  Consent given by: patient  Risks discussed? procedural risks discussed  Performed by: provider  Debridement type: surgical  Level of debridement: subcutaneous tissue    Pre-debridement measurements  Length (cm): 23  Width (cm): 34.2  Depth (cm): 0.2  Surface Area (cm^2): 786. 6  Volume (cm^3): 157.32    Post-debridement measurements  Length (cm): 23  Width (cm): 34.2  Depth (cm): 0.3  Percent debrided: 75%  Surface Area (cm^2): 786.6  Area debrided (cm^2): 589.95  Volume (cm^3): 235.98  Tissue and other material debrided: subcutaneous tissue  Devitalized tissue debrided: biofilm and slough  Instrument(s) utilized: curette  Bleeding: small  Hemostasis obtained with: pressure  Procedural pain (0-10): 2  Post-procedural pain: 3   Response to treatment: procedure was tolerated well    Debridement   Wound 08/06/21 #2 left lower leg Venous Ulcer Leg Left; Lower    Consent obtained? verbal  Consent given by: patient  Risks discussed? procedural risks discussed  Performed by: provider  Debridement type: surgical  Level of debridement: subcutaneous tissue    Pre-debridement measurements  Length (cm): 24.6  Width (cm): 35  Depth (cm): 0.2  Surface Area (cm^2): 861  Volume (cm^3): 172.2    Post-debridement measurements  Length (cm): 24.6  Width (cm): 35  Depth (cm): 0.3  Percent debrided: 75%  Surface Area (cm^2): 102  Area debrided (cm^2): 645.75  Volume (cm^3): 258. 3  Tissue and other material debrided: subcutaneous tissue  Devitalized tissue debrided: biofilm and slough  Instrument(s) utilized: curette  Bleeding: small  Hemostasis obtained with: pressure  Procedural pain (0-10): 2  Post-procedural pain: 3   Response to treatment: procedure was tolerated well

## 2022-12-02 NOTE — PROGRESS NOTES
.Weekly Wound Education Note    Teaching Provided To: Patient  Training Topics: Discharge instructions;Dressing;Edema control;Cleasing and general instructions; Compression  Training Method: Explain/Verbal;Written  Training Response: Patient responds and understands        Notes: Wounds stable. Continue gentamicin, xeroform, ABD pad, kerlix, and ACE wrap to both legs. Pt continues to refuse compression wraps, stating that he isn't ready. Provider discussed that compression may help improve his wounds. Pt stated understanding, but is not agreeable to compression at this time.

## 2022-12-30 ENCOUNTER — APPOINTMENT (OUTPATIENT)
Dept: WOUND CARE | Facility: HOSPITAL | Age: 77
End: 2022-12-30
Attending: NURSE PRACTITIONER
Payer: MEDICARE

## 2022-12-30 VITALS
RESPIRATION RATE: 18 BRPM | HEART RATE: 67 BPM | TEMPERATURE: 97 F | DIASTOLIC BLOOD PRESSURE: 72 MMHG | SYSTOLIC BLOOD PRESSURE: 151 MMHG

## 2022-12-30 DIAGNOSIS — L97.929 CHRONIC VENOUS HYPERTENSION (IDIOPATHIC) WITH ULCER AND INFLAMMATION OF BILATERAL LOWER EXTREMITY (HCC): ICD-10-CM

## 2022-12-30 DIAGNOSIS — L97.212 NON-PRESSURE CHRONIC ULCER OF RIGHT CALF WITH FAT LAYER EXPOSED (HCC): Primary | ICD-10-CM

## 2022-12-30 DIAGNOSIS — T14.8XXA INFECTED WOUND: ICD-10-CM

## 2022-12-30 DIAGNOSIS — I89.0 LYMPHEDEMA: ICD-10-CM

## 2022-12-30 DIAGNOSIS — L08.9 INFECTED WOUND: ICD-10-CM

## 2022-12-30 DIAGNOSIS — L97.222 NON-PRESSURE CHRONIC ULCER OF LEFT CALF WITH FAT LAYER EXPOSED (HCC): ICD-10-CM

## 2022-12-30 DIAGNOSIS — L97.919 CHRONIC VENOUS HYPERTENSION (IDIOPATHIC) WITH ULCER AND INFLAMMATION OF BILATERAL LOWER EXTREMITY (HCC): ICD-10-CM

## 2022-12-30 DIAGNOSIS — I87.333 CHRONIC VENOUS HYPERTENSION (IDIOPATHIC) WITH ULCER AND INFLAMMATION OF BILATERAL LOWER EXTREMITY (HCC): ICD-10-CM

## 2022-12-30 PROCEDURE — 11042 DBRDMT SUBQ TIS 1ST 20SQCM/<: CPT | Performed by: INTERNAL MEDICINE

## 2022-12-30 PROCEDURE — 11045 DBRDMT SUBQ TISS EACH ADDL: CPT | Performed by: INTERNAL MEDICINE

## 2022-12-30 PROCEDURE — 99214 OFFICE O/P EST MOD 30 MIN: CPT

## 2022-12-30 RX ORDER — SULFAMETHOXAZOLE AND TRIMETHOPRIM 400; 80 MG/1; MG/1
1 TABLET ORAL DAILY
Qty: 28 TABLET | Refills: 0 | Status: SHIPPED | OUTPATIENT
Start: 2022-12-30 | End: 2023-01-27

## 2022-12-30 NOTE — PROGRESS NOTES
Weekly Wound Education Note    Teaching Provided To: Patient  Training Topics: Cleasing and general instructions; Compression; Discharge instructions;Dressing;Edema control  Training Method: Explain/Verbal  Training Response: Reinforcement needed;Refused information        Notes: New wound to right leg - silver foam applied. Xeroform, hydrofera classic, kerramax, kerlix, tape. Pt hesitant but agreeable to compression wraps today- unna boot 20-30mmhg applied BLE. Spandagrip E provided if wraps are removed. Educated on the importance of compression to aid wound healing. Also discussed possible OR debridement - pt declinced.

## 2022-12-30 NOTE — PROGRESS NOTES
Patient ID: Raul Rosales is a 65year old male. Debridement   Wound 08/06/21 #1 Right lower leg Venous Ulcer Leg Right; Lower    Consent obtained? verbal  Consent given by: patient  Risks discussed? procedural risks discussed  Performed by: provider  Debridement type: surgical  Level of debridement: subcutaneous tissue  Pain control: lidocaine 4%  Pre-debridement measurements  Length (cm): 23  Width (cm): 34.5  Depth (cm): 0.2  Surface Area (cm^2): 793.5  Volume (cm^3): 158.7    Post-debridement measurements  Length (cm): 23  Width (cm): 34.5  Depth (cm): 0.3  Percent debrided: 25%  Surface Area (cm^2): 793.5  Area debrided (cm^2): 198.38  Volume (cm^3): 238.05  Tissue and other material debrided: subcutaneous tissue  Devitalized tissue debrided: biofilm, necrotic debris and slough  Instrument(s) utilized: curette  Bleeding: medium  Hemostasis obtained with: pressure  Procedural pain (0-10): 6  Post-procedural pain: 2   Response to treatment: procedure was tolerated well    Debridement   Wound 08/06/21 #2 left lower leg Venous Ulcer Leg Left; Lower    Consent obtained? verbal  Consent given by: patient  Risks discussed?  procedural risks discussed  Performed by: provider  Debridement type: surgical  Level of debridement: subcutaneous tissue  Pain control: lidocaine 4%  Pre-debridement measurements  Length (cm): 24.5  Width (cm): 35  Depth (cm): 0.2  Surface Area (cm^2): 857.5  Volume (cm^3): 171.5    Post-debridement measurements  Length (cm): 24.5  Width (cm): 35  Depth (cm): 0.3  Percent debrided: 25%  Surface Area (cm^2): 857.5  Area debrided (cm^2): 214.38  Volume (cm^3): 257.25  Tissue and other material debrided: subcutaneous tissue  Devitalized tissue debrided: biofilm, necrotic debris and slough  Instrument(s) utilized: curette  Bleeding: medium  Hemostasis obtained with: pressure  Procedural pain (0-10): 6  Post-procedural pain: 2   Response to treatment: procedure was tolerated well

## 2022-12-30 NOTE — PATIENT INSTRUCTIONS
Patient Instructions and orders for Wound Care        Serial surgical debridements as able   Continue topical gentamycin  And dakins soaks. Refill bactrim   Recommend compression wrap - when tolerated - refused due to pain. Stressed importance of offloading wounded area and elevating legs as much as possible. Wound Cleaning and Dressings:     Wash your hands with soap and water. Always wear gloves while changing dressings. Donot touch wound / vicente-wound skin with un-gloved hands. Remove old dressing, discard and place into trash. Cleansing: Cleanse with warm water and then use Dakins soak   Apply gentamycin - cover with xerform on wound bed - cover with  HF transfer -Kylee Tucker with protection  Change dressing daily     Compression Therapy :  UNNA BOOT WITH CALAMINE LAYER     Compression Therapy Instructions:  1. Put on first thing in the morning and may remove at bedside. Okay to wear overnight      if comfortable. Do not let stockings roll up/down and kink. Hand wash stockings and      hang dry as needed. 2.  Avoid prolonged standing in one place. It is better to have your calf muscles moving       to pump fluid out of the legs. 3.  Elevate leg(s) above the level of the heart when sitting or as much as possible. 4.  Take your diuretics as directed by your provider. Do not skip doses or change doses      unless instructed to do so by your provider. 5. Do not get leg(s) with compression wrap wet. If wraps are too tight as indicated        By pain, numbness/tingling or discoloration of toes remove wrap completely       and call the   wound center. Miscellaneous Instructions:  Supplement with a daily multivitamin   Low salt diet  Increase protein intake / consider protein supplements - see below  Elevate extremities at all times when sitting / laying down.      DIETARY MODIFICATIONS TO HELP WITH WOUND HEALING:     Protein: Meats, beans, eggs, milk and yogurt particularly Greek yogurt), tofu, soy nuts, soy protein products     Vitamin C: Citrus fruits and juices, strawberries, tomatoes, tomato juice, peppers, baked potatoes, spinach, broccoli, cauliflower, [place] sprouts, cabbage     Vitamin A: Dark green, leafy vegetables, orange or yellow vegetables, cantaloupe, fortified dairy products, liver, fortified cereals     Zinc: Fortified cereals, red meats, seafood     Consider Donnie by [employer] (These are essential branch chain amino acids that help with tissue building and wound healing) and take 2 packets/day. you can order online at [employer] or [employer] REMINDERS:     The treatment plan has been discussed at length with you and your provider. Follow all instructions carefully, it is very important. If you do not follow all instructions, you are at  risk of your wound not healing, infection, possible loss of limb and even end of life. Please call the clinic during regular business hours ( 7:30 AM - 5:30 PM) if you notice increased redness, warmth, pain or pus like drainage or start running a fever greater than 100.3. For after hour emergencies, please call your primary physician or go to the nearest emergency room.

## 2023-01-27 ENCOUNTER — OFFICE VISIT (OUTPATIENT)
Dept: WOUND CARE | Facility: HOSPITAL | Age: 78
End: 2023-01-27
Attending: NURSE PRACTITIONER
Payer: MEDICARE

## 2023-01-27 VITALS
RESPIRATION RATE: 18 BRPM | TEMPERATURE: 98 F | HEART RATE: 80 BPM | DIASTOLIC BLOOD PRESSURE: 74 MMHG | SYSTOLIC BLOOD PRESSURE: 116 MMHG

## 2023-01-27 DIAGNOSIS — I89.0 LYMPHEDEMA: ICD-10-CM

## 2023-01-27 DIAGNOSIS — A49.02 MRSA (METHICILLIN RESISTANT STAPHYLOCOCCUS AUREUS) INFECTION: ICD-10-CM

## 2023-01-27 DIAGNOSIS — B96.5 PSEUDOMONAS (AERUGINOSA) (MALLEI) (PSEUDOMALLEI) AS THE CAUSE OF DISEASES CLASSIFIED ELSEWHERE: ICD-10-CM

## 2023-01-27 DIAGNOSIS — L97.919 CHRONIC VENOUS HYPERTENSION (IDIOPATHIC) WITH ULCER AND INFLAMMATION OF BILATERAL LOWER EXTREMITY (HCC): ICD-10-CM

## 2023-01-27 DIAGNOSIS — L97.212 NON-PRESSURE CHRONIC ULCER OF RIGHT CALF WITH FAT LAYER EXPOSED (HCC): ICD-10-CM

## 2023-01-27 DIAGNOSIS — T14.8XXA INFECTED WOUND: ICD-10-CM

## 2023-01-27 DIAGNOSIS — L08.9 INFECTED WOUND: ICD-10-CM

## 2023-01-27 DIAGNOSIS — L97.222 NON-PRESSURE CHRONIC ULCER OF LEFT CALF WITH FAT LAYER EXPOSED (HCC): ICD-10-CM

## 2023-01-27 DIAGNOSIS — I87.333 CHRONIC VENOUS HYPERTENSION (IDIOPATHIC) WITH ULCER AND INFLAMMATION OF BILATERAL LOWER EXTREMITY (HCC): ICD-10-CM

## 2023-01-27 DIAGNOSIS — L97.212 NON-PRESSURE CHRONIC ULCER OF RIGHT CALF WITH FAT LAYER EXPOSED (HCC): Primary | ICD-10-CM

## 2023-01-27 DIAGNOSIS — L97.929 CHRONIC VENOUS HYPERTENSION (IDIOPATHIC) WITH ULCER AND INFLAMMATION OF BILATERAL LOWER EXTREMITY (HCC): ICD-10-CM

## 2023-01-27 PROCEDURE — 11045 DBRDMT SUBQ TISS EACH ADDL: CPT | Performed by: INTERNAL MEDICINE

## 2023-01-27 PROCEDURE — 11042 DBRDMT SUBQ TIS 1ST 20SQCM/<: CPT | Performed by: INTERNAL MEDICINE

## 2023-01-27 RX ORDER — SULFAMETHOXAZOLE AND TRIMETHOPRIM 400; 80 MG/1; MG/1
TABLET ORAL
Qty: 28 TABLET | Refills: 0 | Status: SHIPPED | OUTPATIENT
Start: 2023-01-27

## 2023-01-27 NOTE — PATIENT INSTRUCTIONS
Patient Instructions and orders for Wound Care        Serial surgical debridements as able   Continue topical gentamycin  And dakins soaks. Refill bactrim   Recommend compression wrap - when tolerated - refused due to pain. Stressed importance of offloading wounded area and elevating legs as much as possible. Wound Cleaning and Dressings:     Wash your hands with soap and water. Always wear gloves while changing dressings. Donot touch wound / vicente-wound skin with un-gloved hands. Remove old dressing, discard and place into trash. Cleansing: Cleanse with warm water and then use Dakins soak   Apply gentamycin - cover with xerform on wound bed - cover with  HF transfer -Terrace Morral with protection  Change dressing daily     Compression Therapy :  UNNA BOOT WITH CALAMINE LAYER     Compression Therapy Instructions:  1. Put on first thing in the morning and may remove at bedside. Okay to wear overnight      if comfortable. Do not let stockings roll up/down and kink. Hand wash stockings and      hang dry as needed. 2.  Avoid prolonged standing in one place. It is better to have your calf muscles moving       to pump fluid out of the legs. 3.  Elevate leg(s) above the level of the heart when sitting or as much as possible. 4.  Take your diuretics as directed by your provider. Do not skip doses or change doses      unless instructed to do so by your provider. 5. Do not get leg(s) with compression wrap wet. If wraps are too tight as indicated        By pain, numbness/tingling or discoloration of toes remove wrap completely       and call the   wound center. Miscellaneous Instructions:  Supplement with a daily multivitamin   Low salt diet  Increase protein intake / consider protein supplements - see below  Elevate extremities at all times when sitting / laying down.      DIETARY MODIFICATIONS TO HELP WITH WOUND HEALING:     Protein: Meats, beans, eggs, milk and yogurt particularly Greek yogurt), tofu, soy nuts, soy protein products     Vitamin C: Citrus fruits and juices, strawberries, tomatoes, tomato juice, peppers, baked potatoes, spinach, broccoli, cauliflower, [place] sprouts, cabbage     Vitamin A: Dark green, leafy vegetables, orange or yellow vegetables, cantaloupe, fortified dairy products, liver, fortified cereals     Zinc: Fortified cereals, red meats, seafood     Consider Donnie by [employer] (These are essential branch chain amino acids that help with tissue building and wound healing) and take 2 packets/day. you can order online at [employer] or [employer] REMINDERS:     The treatment plan has been discussed at length with you and your provider. Follow all instructions carefully, it is very important. If you do not follow all instructions, you are at  risk of your wound not healing, infection, possible loss of limb and even end of life. Please call the clinic during regular business hours ( 7:30 AM - 5:30 PM) if you notice increased redness, warmth, pain or pus like drainage or start running a fever greater than 100.3. For after hour emergencies, please call your primary physician or go to the nearest emergency room.

## 2023-01-27 NOTE — PROGRESS NOTES
Patient ID: Butch Harding is a 65year old male. Debridement   Wound 08/06/21 #1 Right lower leg Venous Ulcer Leg Right; Lower    Consent obtained? verbal  Consent given by: patient  Risks discussed? procedural risks discussed  Performed by: provider  Debridement type: surgical  Level of debridement: subcutaneous tissue    Pre-debridement measurements  Length (cm): 22.3  Width (cm): 34.5  Depth (cm): 0.2  Surface Area (cm^2): 769.35  Volume (cm^3): 153.87    Post-debridement measurements  Length (cm): 22.3  Width (cm): 34.5  Depth (cm): 0.3  Percent debrided: 50%  Surface Area (cm^2): 769.35  Area debrided (cm^2): 384.68  Volume (cm^3): 230.81  Tissue and other material debrided: subcutaneous tissue  Devitalized tissue debrided: biofilm and slough  Instrument(s) utilized: curette  Bleeding: small  Hemostasis obtained with: pressure  Procedural pain (0-10): 2  Post-procedural pain: 3   Response to treatment: procedure was tolerated well    Debridement   Wound 08/06/21 #2 left lower leg Venous Ulcer Leg Left; Lower    Consent obtained? verbal  Consent given by: patient  Risks discussed?  procedural risks discussed  Performed by: provider  Debridement type: surgical  Level of debridement: subcutaneous tissue    Pre-debridement measurements  Length (cm): 24  Width (cm): 34  Depth (cm): 0.2  Surface Area (cm^2): 816  Volume (cm^3): 163.2    Post-debridement measurements  Length (cm): 24  Width (cm): 34  Depth (cm): 0.3  Percent debrided: 50%  Surface Area (cm^2): 816  Area debrided (cm^2): 408  Volume (cm^3): 244.8  Tissue and other material debrided: subcutaneous tissue  Devitalized tissue debrided: biofilm and slough  Instrument(s) utilized: curette  Bleeding: small  Hemostasis obtained with: pressure  Procedural pain (0-10): 2  Post-procedural pain: 3   Response to treatment: procedure was tolerated well

## 2023-01-27 NOTE — PROGRESS NOTES
.Weekly Wound Education Note    Teaching Provided To: Patient  Training Topics: Discharge instructions;Dressing;Edema control;Cleasing and general instructions; Compression  Training Method: Explain/Verbal;Written  Training Response: Patient responds and understands        Notes: Wounds stable. Continue gentamycin, xeroform, kerramax, and kerlix with ACE wraps to both legs.

## 2023-02-20 ENCOUNTER — TELEPHONE (OUTPATIENT)
Dept: WOUND CARE | Facility: HOSPITAL | Age: 78
End: 2023-02-20

## 2023-02-20 NOTE — TELEPHONE ENCOUNTER
Patient had left a message wanting a refill for Bactrim. LVM with [facility] authorizing refill.

## 2023-02-24 ENCOUNTER — APPOINTMENT (OUTPATIENT)
Dept: WOUND CARE | Facility: HOSPITAL | Age: 78
End: 2023-02-24
Attending: NURSE PRACTITIONER
Payer: MEDICARE

## 2023-03-10 ENCOUNTER — APPOINTMENT (OUTPATIENT)
Dept: WOUND CARE | Facility: HOSPITAL | Age: 78
End: 2023-03-10
Attending: NURSE PRACTITIONER
Payer: MEDICARE

## 2023-03-24 ENCOUNTER — OFFICE VISIT (OUTPATIENT)
Dept: WOUND CARE | Facility: HOSPITAL | Age: 78
End: 2023-03-24
Attending: INTERNAL MEDICINE
Payer: MEDICARE

## 2023-03-24 VITALS
TEMPERATURE: 99 F | DIASTOLIC BLOOD PRESSURE: 63 MMHG | SYSTOLIC BLOOD PRESSURE: 104 MMHG | HEART RATE: 71 BPM | RESPIRATION RATE: 18 BRPM

## 2023-03-24 DIAGNOSIS — T14.8XXA INFECTED WOUND: ICD-10-CM

## 2023-03-24 DIAGNOSIS — I89.0 LYMPHEDEMA: ICD-10-CM

## 2023-03-24 DIAGNOSIS — L97.222 NON-PRESSURE CHRONIC ULCER OF LEFT CALF WITH FAT LAYER EXPOSED (HCC): ICD-10-CM

## 2023-03-24 DIAGNOSIS — L97.212 NON-PRESSURE CHRONIC ULCER OF RIGHT CALF WITH FAT LAYER EXPOSED (HCC): Primary | ICD-10-CM

## 2023-03-24 DIAGNOSIS — L08.9 INFECTED WOUND: ICD-10-CM

## 2023-03-24 DIAGNOSIS — I87.333 CHRONIC VENOUS HYPERTENSION (IDIOPATHIC) WITH ULCER AND INFLAMMATION OF BILATERAL LOWER EXTREMITY (HCC): ICD-10-CM

## 2023-03-24 PROCEDURE — 11042 DBRDMT SUBQ TIS 1ST 20SQCM/<: CPT | Performed by: INTERNAL MEDICINE

## 2023-03-24 PROCEDURE — 11045 DBRDMT SUBQ TISS EACH ADDL: CPT | Performed by: INTERNAL MEDICINE

## 2023-03-24 RX ORDER — SULFAMETHOXAZOLE AND TRIMETHOPRIM 400; 80 MG/1; MG/1
1 TABLET ORAL DAILY
Qty: 28 TABLET | Refills: 0 | Status: SHIPPED | OUTPATIENT
Start: 2023-03-24

## 2023-03-24 NOTE — PROGRESS NOTES
Patient ID: Silvestre Montenegro is a 65year old male. Debridement   Wound 08/06/21 #1 Right lower leg Venous Ulcer Leg Right; Lower    Consent obtained? verbal  Consent given by: patient  Risks discussed? procedural risks discussed  Performed by: provider  Debridement type: surgical  Level of debridement: subcutaneous tissue  Pain control: lidocaine 4%  Pre-debridement measurements  Length (cm): 22.6  Width (cm): 34  Depth (cm): 0.2  Surface Area (cm^2): 768.4  Volume (cm^3): 153.68    Post-debridement measurements  Length (cm): 22.6  Width (cm): 3.5  Depth (cm): 0.3  Percent debrided: 50%  Surface Area (cm^2): 79.1  Area debrided (cm^2): 39.55  Volume (cm^3): 23.73  Devitalized tissue debrided: biofilm, necrotic debris and slough  Instrument(s) utilized: curette  Bleeding: medium  Hemostasis obtained with: pressure  Procedural pain (0-10): 4  Post-procedural pain: 0   Response to treatment: procedure was tolerated well    Debridement   Wound 08/06/21 #2 left lower leg Venous Ulcer Leg Left; Lower    Consent obtained? verbal  Consent given by: patient  Risks discussed?  procedural risks discussed  Performed by: provider  Debridement type: surgical  Level of debridement: subcutaneous tissue  Pain control: lidocaine 4%  Pre-debridement measurements  Length (cm): 24  Width (cm): 34  Depth (cm): 0.2  Surface Area (cm^2): 816  Volume (cm^3): 163.2    Post-debridement measurements  Length (cm): 24  Width (cm): 35  Depth (cm): 0.3  Percent debrided: 60%  Surface Area (cm^2): 840  Area debrided (cm^2): 504  Volume (cm^3): 252  Devitalized tissue debrided: biofilm, necrotic debris and slough  Instrument(s) utilized: curette  Bleeding: medium  Hemostasis obtained with: pressure  Procedural pain (0-10): 5  Post-procedural pain: 0   Response to treatment: procedure was tolerated well

## 2023-03-24 NOTE — PROGRESS NOTES
Weekly Wound Education Note    Teaching Provided To: Patient  Training Topics: Cleasing and general instructions; Compression; Discharge instructions;Dressing;Edema control  Training Method: Explain/Verbal  Training Response: Patient responds and understands; Reinforcement needed        Notes: Stable. Continue to refuse compression wraps - explained the importance compression can aid with wound healing. Zinc to periwound, xeroform, kerramax, kerlix, tape, ACE wraps BLE. Daskin solution soak prior to dressings being applied.

## 2023-04-25 ENCOUNTER — TELEPHONE (OUTPATIENT)
Dept: WOUND CARE | Facility: HOSPITAL | Age: 78
End: 2023-04-25

## 2023-04-25 NOTE — TELEPHONE ENCOUNTER
Pt LVM requesting refill for Batrim. Provider made aware - ok to refill bactrim for 1 month. RN called in refill to pharmacy on file. Pt made aware.

## 2023-05-05 ENCOUNTER — OFFICE VISIT (OUTPATIENT)
Dept: WOUND CARE | Facility: HOSPITAL | Age: 78
End: 2023-05-05
Attending: INTERNAL MEDICINE
Payer: MEDICARE

## 2023-05-05 VITALS
DIASTOLIC BLOOD PRESSURE: 70 MMHG | RESPIRATION RATE: 18 BRPM | SYSTOLIC BLOOD PRESSURE: 137 MMHG | HEART RATE: 60 BPM | TEMPERATURE: 97 F

## 2023-05-05 DIAGNOSIS — T14.8XXA INFECTED WOUND: ICD-10-CM

## 2023-05-05 DIAGNOSIS — I89.0 LYMPHEDEMA: ICD-10-CM

## 2023-05-05 DIAGNOSIS — L97.222 NON-PRESSURE CHRONIC ULCER OF LEFT CALF WITH FAT LAYER EXPOSED (HCC): ICD-10-CM

## 2023-05-05 DIAGNOSIS — L97.212 NON-PRESSURE CHRONIC ULCER OF RIGHT CALF WITH FAT LAYER EXPOSED (HCC): Primary | ICD-10-CM

## 2023-05-05 DIAGNOSIS — I87.333 CHRONIC VENOUS HYPERTENSION (IDIOPATHIC) WITH ULCER AND INFLAMMATION OF BILATERAL LOWER EXTREMITY (HCC): ICD-10-CM

## 2023-05-05 DIAGNOSIS — L08.9 INFECTED WOUND: ICD-10-CM

## 2023-05-05 PROCEDURE — 87070 CULTURE OTHR SPECIMN AEROBIC: CPT | Performed by: INTERNAL MEDICINE

## 2023-05-05 PROCEDURE — 11042 DBRDMT SUBQ TIS 1ST 20SQCM/<: CPT | Performed by: INTERNAL MEDICINE

## 2023-05-05 PROCEDURE — 99214 OFFICE O/P EST MOD 30 MIN: CPT

## 2023-05-05 PROCEDURE — 87147 CULTURE TYPE IMMUNOLOGIC: CPT | Performed by: INTERNAL MEDICINE

## 2023-05-05 PROCEDURE — 87077 CULTURE AEROBIC IDENTIFY: CPT | Performed by: INTERNAL MEDICINE

## 2023-05-05 PROCEDURE — 87186 SC STD MICRODIL/AGAR DIL: CPT | Performed by: INTERNAL MEDICINE

## 2023-05-05 PROCEDURE — 11045 DBRDMT SUBQ TISS EACH ADDL: CPT | Performed by: INTERNAL MEDICINE

## 2023-05-05 PROCEDURE — 87205 SMEAR GRAM STAIN: CPT | Performed by: INTERNAL MEDICINE

## 2023-05-05 NOTE — PROGRESS NOTES
Patient ID: Jd Canada is a 65year old male. Debridement   Wound 08/06/21 #1 Right lower leg Venous Ulcer Leg Right; Lower    Consent obtained? verbal  Consent given by: patient  Risks discussed? procedural risks discussed  Performed by: provider  Debridement type: surgical  Level of debridement: subcutaneous tissue    Pre-debridement measurements  Length (cm): 22.7  Width (cm): 32.8  Depth (cm): 0.2  Surface Area (cm^2): 744.56  Volume (cm^3): 148.91    Post-debridement measurements  Length (cm): 22.7  Width (cm): 32.8  Depth (cm): 0.63  Percent debrided: 50%  Surface Area (cm^2): 744.56  Area debrided (cm^2): 372.28  Volume (cm^3): 469.07  Tissue and other material debrided: subcutaneous tissue  Devitalized tissue debrided: biofilm and slough  Instrument(s) utilized: curette  Bleeding: medium  Hemostasis obtained with: pressure  Procedural pain (0-10): 1  Post-procedural pain: 2   Response to treatment: procedure was tolerated well    Debridement   Wound 08/06/21 #2 left lower leg Venous Ulcer Leg Left; Lower    Consent obtained? verbal  Consent given by: patient  Risks discussed?  procedural risks discussed  Performed by: provider  Debridement type: surgical  Level of debridement: subcutaneous tissue    Pre-debridement measurements  Length (cm): 23.7  Width (cm): 35  Depth (cm): 0.2  Surface Area (cm^2): 829.5  Volume (cm^3): 165.9    Post-debridement measurements  Length (cm): 23.7  Width (cm): 35  Depth (cm): 0.3  Percent debrided: 50%  Surface Area (cm^2): 829.5  Area debrided (cm^2): 414.75  Volume (cm^3): 248.85  Tissue and other material debrided: subcutaneous tissue  Devitalized tissue debrided: biofilm and slough  Instrument(s) utilized: curette  Bleeding: medium  Hemostasis obtained with: pressure  Procedural pain (0-10): 1  Post-procedural pain: 2   Response to treatment: procedure was tolerated well

## 2023-05-05 NOTE — PROGRESS NOTES
.Weekly Wound Education Note    Teaching Provided To: Patient  Training Topics: Discharge instructions;Dressing;Edema control;Cleasing and general instructions; Compression  Training Method: Explain/Verbal;Written  Training Response: Patient responds and understands        Notes: Wounds stable. Both legs cultured this visit. Continue open xeroform, ABD pads, kerlix and ACE wraps. Wounds soaked with Dakins before redressing.

## 2023-05-05 NOTE — PATIENT INSTRUCTIONS
Patient Instructions and orders for Wound Care        Wound culture today. Serial surgical debridements as able   Continue topical gentamycin  And dakins soaks. Refill bactrim low dose preventive. Recommend compression wrap - when tolerated - refused due to pain. Stressed importance of offloading wounded area and elevating legs as much as possible. Wound Cleaning and Dressings:     Wash your hands with soap and water. Always wear gloves while changing dressings. Donot touch wound / vicente-wound skin with un-gloved hands. Remove old dressing, discard and place into trash. Cleansing: Cleanse with warm water and then use Dakins soak   Apply gentamycin - cover with xerform on wound bed - cover with  HF transfer -Earlie Guan with protection  Change dressing daily     Compression Therapy :  UNNA BOOT WITH CALAMINE LAYER     Compression Therapy Instructions:  1. Put on first thing in the morning and may remove at bedside. Okay to wear overnight      if comfortable. Do not let stockings roll up/down and kink. Hand wash stockings and      hang dry as needed. 2.  Avoid prolonged standing in one place. It is better to have your calf muscles moving       to pump fluid out of the legs. 3.  Elevate leg(s) above the level of the heart when sitting or as much as possible. 4.  Take your diuretics as directed by your provider. Do not skip doses or change doses      unless instructed to do so by your provider. 5. Do not get leg(s) with compression wrap wet. If wraps are too tight as indicated        By pain, numbness/tingling or discoloration of toes remove wrap completely       and call the   wound center. Miscellaneous Instructions:  Supplement with a daily multivitamin   Low salt diet  Increase protein intake / consider protein supplements - see below  Elevate extremities at all times when sitting / laying down.      DIETARY MODIFICATIONS TO HELP WITH WOUND HEALING:     Protein: Meats, beans, eggs, milk and yogurt particularly [place] yogurt), tofu, soy nuts, soy protein products     Vitamin C: Citrus fruits and juices, strawberries, tomatoes, tomato juice, peppers, baked potatoes, spinach, broccoli, cauliflower, [place] sprouts, cabbage     Vitamin A: Dark green, leafy vegetables, orange or yellow vegetables, cantaloupe, fortified dairy products, liver, fortified cereals     Zinc: Fortified cereals, red meats, seafood     Consider Donnie by [employer] (These are essential branch chain amino acids that help with tissue building and wound healing) and take 2 packets/day. you can order online at [employer] or [employer] REMINDERS:     The treatment plan has been discussed at length with you and your provider. Follow all instructions carefully, it is very important. If you do not follow all instructions, you are at  risk of your wound not healing, infection, possible loss of limb and even end of life. Please call the clinic during regular business hours ( 7:30 AM - 5:30 PM) if you notice increased redness, warmth, pain or pus like drainage or start running a fever greater than 100.3. For after hour emergencies, please call your primary physician or go to the nearest emergency room.

## 2023-05-11 ENCOUNTER — TELEPHONE (OUTPATIENT)
Dept: WOUND CARE | Facility: HOSPITAL | Age: 78
End: 2023-05-11

## 2023-06-12 ENCOUNTER — TELEPHONE (OUTPATIENT)
Dept: WOUND CARE | Facility: HOSPITAL | Age: 78
End: 2023-06-12

## 2023-06-12 NOTE — TELEPHONE ENCOUNTER
Patient calling asking for refill of bactrim sent to [facility] in [place]. Provider made aware.

## 2023-06-16 ENCOUNTER — OFFICE VISIT (OUTPATIENT)
Dept: WOUND CARE | Facility: HOSPITAL | Age: 78
End: 2023-06-16
Attending: INTERNAL MEDICINE
Payer: MEDICARE

## 2023-06-16 VITALS
RESPIRATION RATE: 17 BRPM | DIASTOLIC BLOOD PRESSURE: 67 MMHG | SYSTOLIC BLOOD PRESSURE: 159 MMHG | TEMPERATURE: 98 F | HEART RATE: 51 BPM

## 2023-06-16 DIAGNOSIS — I87.333 CHRONIC VENOUS HYPERTENSION (IDIOPATHIC) WITH ULCER AND INFLAMMATION OF BILATERAL LOWER EXTREMITY (HCC): ICD-10-CM

## 2023-06-16 DIAGNOSIS — I89.0 LYMPHEDEMA: ICD-10-CM

## 2023-06-16 DIAGNOSIS — B96.5 PSEUDOMONAS (AERUGINOSA) (MALLEI) (PSEUDOMALLEI) AS THE CAUSE OF DISEASES CLASSIFIED ELSEWHERE: ICD-10-CM

## 2023-06-16 DIAGNOSIS — L97.222 NON-PRESSURE CHRONIC ULCER OF LEFT CALF WITH FAT LAYER EXPOSED (HCC): ICD-10-CM

## 2023-06-16 DIAGNOSIS — A49.01 STAPH AUREUS INFECTION: ICD-10-CM

## 2023-06-16 DIAGNOSIS — A49.8 E COLI INFECTION: ICD-10-CM

## 2023-06-16 DIAGNOSIS — L97.212 NON-PRESSURE CHRONIC ULCER OF RIGHT CALF WITH FAT LAYER EXPOSED (HCC): Primary | ICD-10-CM

## 2023-06-16 DIAGNOSIS — T14.8XXA INFECTED WOUND: ICD-10-CM

## 2023-06-16 DIAGNOSIS — L08.9 INFECTED WOUND: ICD-10-CM

## 2023-06-16 PROCEDURE — 11042 DBRDMT SUBQ TIS 1ST 20SQCM/<: CPT | Performed by: INTERNAL MEDICINE

## 2023-06-16 PROCEDURE — 11045 DBRDMT SUBQ TISS EACH ADDL: CPT | Performed by: INTERNAL MEDICINE

## 2023-06-16 NOTE — PROGRESS NOTES
Weekly Wound Education Note    Teaching Provided To: Patient  Training Topics: Cleasing and general instructions; Discharge instructions;Dressing; Compression;Edema control;Signs and symptoms of infection  Training Method: Explain/Verbal  Training Response: Patient responds and understands; Reinforcement needed        Notes: Stable. Continue to refuse compression wraps - explained the importance. Xeroform, ABD pad, kerlix, tape, ACE wraps - BLE. Orders faxed to [facility].

## 2023-06-16 NOTE — PROGRESS NOTES
Patient ID: Loreta Julio is a 65year old male. Debridement   Wound 21 #1 Right lower leg Venous Ulcer Leg Right; Lower    Consent obtained? verbal  Consent given by: patient  Risks discussed? procedural risks discussed  Performed by: provider  Debridement type: surgical  Level of debridement: subcutaneous tissue  Pain control: lidocaine 4%  Pre-debridement measurements  Length (cm): 40  Width (cm): 29  Depth (cm): 0.2  Surface Area (cm^2): 1160  Volume (cm^3): 232    Post-debridement measurements  Length (cm): 41  Width (cm): 29  Depth (cm): 0.3  Percent debrided: 80%  Surface Area (cm^2): 5043  Area debrided (cm^2): 951.2  Volume (cm^3): 356.7  Tissue and other material debrided: subcutaneous tissue  Devitalized tissue debrided: biofilm, necrotic debris and slough  Instrument(s) utilized: curette  Bleeding: medium  Hemostasis obtained with: pressure  Procedural pain (0-10): 4  Post-procedural pain: 0   Response to treatment: procedure was tolerated well    Debridement   Wound 21 #2 left lower leg Venous Ulcer Leg Left; Lower    Consent obtained? verbal  Consent given by: patient  Risks discussed?  procedural risks discussed  Performed by: provider  Debridement type: surgical  Level of debridement: subcutaneous tissue  Pain control: lidocaine 4%  Pre-debridement measurements  Length (cm): 26  Width (cm): 26  Depth (cm): 0.2  Surface Area (cm^2): 676  Volume (cm^3): 135.2    Post-debridement measurements  Length (cm): 26  Width (cm): 27  Depth (cm): 0.3  Percent debrided: 80%  Surface Area (cm^2): 702  Area debrided (cm^2): 561.6  Volume (cm^3): 210.6  Devitalized tissue debrided: biofilm, necrotic debris and slough  Instrument(s) utilized: curette  Bleeding: medium  Hemostasis obtained with: pressure  Procedural pain (0-10): 4  Post-procedural pain: 0   Response to treatment: procedure was tolerated well

## 2023-06-16 NOTE — PATIENT INSTRUCTIONS
Patient Instructions and orders for Wound Care        Consider ID consult due to repeated wound infections and colonizations. Serial surgical debridements as able   Continue topical gentamycin  And dakins soaks. Refill bactrim low dose preventive. Recommend compression wrap - when tolerated - refused due to pain. Stressed importance of offloading wounded area and elevating legs as much as possible. Wound Cleaning and Dressings:     Wash your hands with soap and water. Always wear gloves while changing dressings. Donot touch wound / vicente-wound skin with un-gloved hands. Remove old dressing, discard and place into trash. Cleansing: Cleanse with warm water and then use Dakins soak   Apply gentamycin - cover with xerform on wound bed - cover with  HF transfer -Anita Flatness with protection  Change dressing daily     Compression Therapy :  UNNA BOOT WITH CALAMINE LAYER     Compression Therapy Instructions:  1. Put on first thing in the morning and may remove at bedside. Okay to wear overnight      if comfortable. Do not let stockings roll up/down and kink. Hand wash stockings and      hang dry as needed. 2.  Avoid prolonged standing in one place. It is better to have your calf muscles moving       to pump fluid out of the legs. 3.  Elevate leg(s) above the level of the heart when sitting or as much as possible. 4.  Take your diuretics as directed by your provider. Do not skip doses or change doses      unless instructed to do so by your provider. 5. Do not get leg(s) with compression wrap wet. If wraps are too tight as indicated        By pain, numbness/tingling or discoloration of toes remove wrap completely       and call the   wound center. Miscellaneous Instructions:  Supplement with a daily multivitamin   Low salt diet  Increase protein intake / consider protein supplements - see below  Elevate extremities at all times when sitting / laying down.      DIETARY MODIFICATIONS TO HELP WITH WOUND HEALING:     Protein: Meats, beans, eggs, milk and yogurt particularly [place] yogurt), tofu, soy nuts, soy protein products     Vitamin C: Citrus fruits and juices, strawberries, tomatoes, tomato juice, peppers, baked potatoes, spinach, broccoli, cauliflower, [place] sprouts, cabbage     Vitamin A: Dark green, leafy vegetables, orange or yellow vegetables, cantaloupe, fortified dairy products, liver, fortified cereals     Zinc: Fortified cereals, red meats, seafood     Consider Donnie by [employer] (These are essential branch chain amino acids that help with tissue building and wound healing) and take 2 packets/day. you can order online at [employer] or [employer] REMINDERS:     The treatment plan has been discussed at length with you and your provider. Follow all instructions carefully, it is very important. If you do not follow all instructions, you are at  risk of your wound not healing, infection, possible loss of limb and even end of life. Please call the clinic during regular business hours ( 7:30 AM - 5:30 PM) if you notice increased redness, warmth, pain or pus like drainage or start running a fever greater than 100.3. For after hour emergencies, please call your primary physician or go to the nearest emergency room.

## 2023-08-04 ENCOUNTER — OFFICE VISIT (OUTPATIENT)
Dept: WOUND CARE | Facility: HOSPITAL | Age: 78
End: 2023-08-04
Attending: INTERNAL MEDICINE
Payer: MEDICARE

## 2023-08-04 VITALS
RESPIRATION RATE: 16 BRPM | HEART RATE: 55 BPM | TEMPERATURE: 98 F | SYSTOLIC BLOOD PRESSURE: 138 MMHG | DIASTOLIC BLOOD PRESSURE: 72 MMHG

## 2023-08-04 DIAGNOSIS — L97.222 NON-PRESSURE CHRONIC ULCER OF LEFT CALF WITH FAT LAYER EXPOSED (HCC): ICD-10-CM

## 2023-08-04 DIAGNOSIS — L97.212 NON-PRESSURE CHRONIC ULCER OF RIGHT CALF WITH FAT LAYER EXPOSED (HCC): Primary | ICD-10-CM

## 2023-08-04 DIAGNOSIS — I89.0 LYMPHEDEMA: ICD-10-CM

## 2023-08-04 DIAGNOSIS — I87.333 CHRONIC VENOUS HYPERTENSION (IDIOPATHIC) WITH ULCER AND INFLAMMATION OF BILATERAL LOWER EXTREMITY (HCC): ICD-10-CM

## 2023-08-04 PROCEDURE — 11045 DBRDMT SUBQ TISS EACH ADDL: CPT | Performed by: INTERNAL MEDICINE

## 2023-08-04 PROCEDURE — 11042 DBRDMT SUBQ TIS 1ST 20SQCM/<: CPT | Performed by: INTERNAL MEDICINE

## 2023-08-04 NOTE — PROGRESS NOTES
Weekly Wound Education Note    Teaching Provided To: Patient  Training Topics: Cleasing and general instructions; Discharge instructions;Dressing  Training Method: Explain/Verbal;Written  Training Response: Refused information;Patient responds and understands; Reinforcement needed        Notes: Stable. Continues to refuse compression wraps/spandagrip, discussed possibly using his velcro wraps over the ACE wraps - declined idea. Continue with xeroform, kerramax, conforming gauze, tape, ACE wrap - BLE.

## 2023-08-04 NOTE — PATIENT INSTRUCTIONS
Patient Instructions and orders for Wound Care        Consider ID consult due to repeated wound infections and colonizations. Serial surgical debridements as able   Continue topical gentamycin  And dakins soaks. Refill bactrim low dose preventive. Recommend compression wrap - when tolerated - refused due to pain. Stressed importance of offloading wounded area and elevating legs as much as possible. Wound Cleaning and Dressings:     Wash your hands with soap and water. Always wear gloves while changing dressings. Donot touch wound / vicente-wound skin with un-gloved hands. Remove old dressing, discard and place into trash. Cleansing: Cleanse with warm water and then use Dakins soak   Apply gentamycin - cover with xerform on wound bed - cover with  HF transfer -Miguel Angel Diaz with protection  Change dressing daily     Compression Therapy :  UNNA BOOT WITH CALAMINE LAYER     Compression Therapy Instructions:  1. Put on first thing in the morning and may remove at bedside. Okay to wear overnight      if comfortable. Do not let stockings roll up/down and kink. Hand wash stockings and      hang dry as needed. 2.  Avoid prolonged standing in one place. It is better to have your calf muscles moving       to pump fluid out of the legs. 3.  Elevate leg(s) above the level of the heart when sitting or as much as possible. 4.  Take your diuretics as directed by your provider. Do not skip doses or change doses      unless instructed to do so by your provider. 5. Do not get leg(s) with compression wrap wet. If wraps are too tight as indicated        By pain, numbness/tingling or discoloration of toes remove wrap completely       and call the   wound center. Miscellaneous Instructions:  Supplement with a daily multivitamin   Low salt diet  Increase protein intake / consider protein supplements - see below  Elevate extremities at all times when sitting / laying down.      DIETARY MODIFICATIONS TO HELP WITH WOUND HEALING:     Protein: Meats, beans, eggs, milk and yogurt particularly [place] yogurt), tofu, soy nuts, soy protein products     Vitamin C: Citrus fruits and juices, strawberries, tomatoes, tomato juice, peppers, baked potatoes, spinach, broccoli, cauliflower, [place] sprouts, cabbage     Vitamin A: Dark green, leafy vegetables, orange or yellow vegetables, cantaloupe, fortified dairy products, liver, fortified cereals     Zinc: Fortified cereals, red meats, seafood     Consider Donnie by [employer] (These are essential branch chain amino acids that help with tissue building and wound healing) and take 2 packets/day. you can order online at [employer] or [employer] REMINDERS:     The treatment plan has been discussed at length with you and your provider. Follow all instructions carefully, it is very important. If you do not follow all instructions, you are at  risk of your wound not healing, infection, possible loss of limb and even end of life. Please call the clinic during regular business hours ( 7:30 AM - 5:30 PM) if you notice increased redness, warmth, pain or pus like drainage or start running a fever greater than 100.3. For after hour emergencies, please call your primary physician or go to the nearest emergency room.

## 2023-08-04 NOTE — PROGRESS NOTES
Patient ID: Kiko Medrano is a 77year old male. Debridement   Wound 08/06/21 #1 Right lower leg Venous Ulcer Leg Right; Lower    Consent obtained? verbal  Consent given by: patient  Risks discussed? procedural risks discussed  Performed by: provider  Debridement type: surgical  Level of debridement: subcutaneous tissue  Pain control: lidocaine 2%  Pre-debridement measurements  Length (cm): 22.5  Width (cm): 33.2  Depth (cm): 0.2  Surface Area (cm^2): 747  Volume (cm^3): 149.4    Post-debridement measurements  Length (cm): 22.6  Width (cm): 33.2  Depth (cm): 0.3  Percent debrided: 50%  Surface Area (cm^2): 750.3  Area debrided (cm^2): 375.2  Volume (cm^3): 225.1  Tissue and other material debrided: subcutaneous tissue  Devitalized tissue debrided: biofilm, necrotic debris and slough  Instrument(s) utilized: curette  Bleeding: medium  Hemostasis obtained with: pressure  Procedural pain (0-10): 6  Post-procedural pain: 1   Response to treatment: procedure was tolerated well    Debridement   Wound 08/06/21 #2 left lower leg Venous Ulcer Leg Left; Lower    Consent obtained? verbal  Consent given by: patient  Risks discussed?  procedural risks discussed  Performed by: provider  Debridement type: surgical  Level of debridement: subcutaneous tissue  Pain control: lidocaine 4%  Pre-debridement measurements  Length (cm): 23.5  Width (cm): 34.7  Depth (cm): 0.2  Surface Area (cm^2): 815.5  Volume (cm^3): 163.1    Post-debridement measurements  Length (cm): 23.6  Width (cm): 34.7  Depth (cm): 0.3  Percent debrided: 50%  Surface Area (cm^2): 818.9  Area debrided (cm^2): 409.5  Volume (cm^3): 245.7  Tissue and other material debrided: subcutaneous tissue  Devitalized tissue debrided: biofilm, necrotic debris and slough  Instrument(s) utilized: curette  Bleeding: medium  Hemostasis obtained with: pressure  Procedural pain (0-10): 6  Post-procedural pain: 1   Response to treatment: procedure was tolerated well

## 2023-10-27 ENCOUNTER — OFFICE VISIT (OUTPATIENT)
Dept: WOUND CARE | Facility: HOSPITAL | Age: 78
End: 2023-10-27
Attending: INTERNAL MEDICINE

## 2023-10-27 VITALS
TEMPERATURE: 98 F | DIASTOLIC BLOOD PRESSURE: 69 MMHG | SYSTOLIC BLOOD PRESSURE: 123 MMHG | HEART RATE: 50 BPM | RESPIRATION RATE: 18 BRPM

## 2023-10-27 DIAGNOSIS — I87.333 CHRONIC VENOUS HYPERTENSION (IDIOPATHIC) WITH ULCER AND INFLAMMATION OF BILATERAL LOWER EXTREMITY (HCC): ICD-10-CM

## 2023-10-27 DIAGNOSIS — L08.9 INFECTED WOUND: ICD-10-CM

## 2023-10-27 DIAGNOSIS — I89.0 LYMPHEDEMA: ICD-10-CM

## 2023-10-27 DIAGNOSIS — T14.8XXA INFECTED WOUND: ICD-10-CM

## 2023-10-27 DIAGNOSIS — L97.222 NON-PRESSURE CHRONIC ULCER OF LEFT CALF WITH FAT LAYER EXPOSED (HCC): ICD-10-CM

## 2023-10-27 DIAGNOSIS — L97.212 NON-PRESSURE CHRONIC ULCER OF RIGHT CALF WITH FAT LAYER EXPOSED (HCC): Primary | ICD-10-CM

## 2023-10-27 PROCEDURE — 87205 SMEAR GRAM STAIN: CPT | Performed by: INTERNAL MEDICINE

## 2023-10-27 PROCEDURE — 87070 CULTURE OTHR SPECIMN AEROBIC: CPT | Performed by: INTERNAL MEDICINE

## 2023-10-27 PROCEDURE — 87077 CULTURE AEROBIC IDENTIFY: CPT | Performed by: INTERNAL MEDICINE

## 2023-10-27 PROCEDURE — 99214 OFFICE O/P EST MOD 30 MIN: CPT

## 2023-10-27 NOTE — PROGRESS NOTES
Weekly Wound Education Note    Teaching Provided To: Patient  Training Topics: Cleasing and general instructions; Discharge instructions;Dressing  Training Method: Explain/Verbal  Training Response: Patient responds and understands; Reinforcement needed        Notes: Wounds cultured. Patient continues to refuse compression wraps. Provider discuss possibly going to a [place] hos[facility] - patient declines this idea. Karyna soak wash, xeroform, kerramax, conforming gauze, tape, ACE wraps - BLE.

## 2023-10-27 NOTE — PATIENT INSTRUCTIONS
Patient Instructions and orders for Wound Care        Wound culture. Consider consult with a tertiary care center   Consider ID consult due to repeated wound infections and colonizations. Consider OR  surgical debridements  Recommend HIBICLENS SOAP [address]. Continue dakins soaks. Antibiotics based on culture results. Recommend compression wrap  Stressed importance of offloading wounded area and elevating legs as much as possible. High protein diet. Wound Cleaning and Dressings:     Wash your hands with soap and water. Always wear gloves while changing dressings. Donot touch wound / vicenet-wound skin with un-gloved hands. Remove old dressing, discard and place into trash. Cleansing: Cleanse with warm water and then use Dakins soak   Apply xerform on wound bed - cover with  HF transfer -[place] [employer] with protection  Change dressing daily     Compression Therapy :  ace wrap     Compression Therapy Instructions:  1. Put on first thing in the morning and may remove at bedside. Okay to wear overnight      if comfortable. Do not let stockings roll up/down and kink. Hand wash stockings and      hang dry as needed. 2.  Avoid prolonged standing in one place. It is better to have your calf muscles moving       to pump fluid out of the legs. 3.  Elevate leg(s) above the level of the heart when sitting or as much as possible. 4.  Take your diuretics as directed by your provider. Do not skip doses or change doses      unless instructed to do so by your provider. 5. Do not get leg(s) with compression wrap wet. If wraps are too tight as indicated        By pain, numbness/tingling or discoloration of toes remove wrap completely       and call the   wound center. Miscellaneous Instructions:  Supplement with a daily multivitamin   Low salt diet  Increase protein intake / consider protein supplements - see below  Elevate extremities at all times when sitting / laying down.      DIETARY MODIFICATIONS TO HELP WITH WOUND HEALING:     Protein: Meats, beans, eggs, milk and yogurt particularly [place] yogurt), tofu, soy nuts, soy protein products     Vitamin C: Citrus fruits and juices, strawberries, tomatoes, tomato juice, peppers, baked potatoes, spinach, broccoli, cauliflower, [place] sprouts, cabbage     Vitamin A: Dark green, leafy vegetables, orange or yellow vegetables, cantaloupe, fortified dairy products, liver, fortified cereals     Zinc: Fortified cereals, red meats, seafood     Consider [employer] (These are essential branch chain amino acids that help with tissue building and wound healing) and take 2 packets/day. you can order online at [employer] or [employer] REMINDERS:     The treatment plan has been discussed at length with you and your provider. Follow all instructions carefully, it is very important. If you do not follow all instructions, you are at  risk of your wound not healing, infection, possible loss of limb and even end of life. Please call the clinic during regular business hours ( 7:30 AM - 5:30 PM) if you notice increased redness, warmth, pain or pus like drainage or start running a fever greater than 100.3. For after hour emergencies, please call your primary physician or go to the nearest emergency room.

## 2023-10-30 NOTE — PROGRESS NOTES
Reviewed culture results with patient - told him to stop taking Bactrim and to  new antibiotic. Patient states he will  the medication today.

## 2023-11-03 ENCOUNTER — TELEPHONE (OUTPATIENT)
Dept: WOUND CARE | Facility: HOSPITAL | Age: 78
End: 2023-11-03

## 2023-11-03 NOTE — TELEPHONE ENCOUNTER
Received a message from patient requesting most recent culture results be faxed to Dr. Rigoberto Flores office. Confirmed fax number with office [phone], made office aware that results will be sent today.

## 2023-12-01 ENCOUNTER — TELEPHONE (OUTPATIENT)
Dept: WOUND CARE | Facility: HOSPITAL | Age: 78
End: 2023-12-01

## 2023-12-01 ENCOUNTER — APPOINTMENT (OUTPATIENT)
Dept: WOUND CARE | Facility: HOSPITAL | Age: 78
End: 2023-12-01
Attending: INTERNAL MEDICINE
Payer: MEDICARE

## 2024-03-11 ENCOUNTER — TELEPHONE (OUTPATIENT)
Dept: OPTOMETRY | Age: 79
End: 2024-03-11

## 2024-04-02 ENCOUNTER — TELEPHONE (OUTPATIENT)
Dept: OTHER | Age: 79
End: 2024-04-02

## 2024-06-18 ENCOUNTER — NURSING HOME VISIT (OUTPATIENT)
Dept: NEPHROLOGY | Age: 79
End: 2024-06-18

## 2024-06-18 VITALS
HEART RATE: 58 BPM | TEMPERATURE: 97.4 F | RESPIRATION RATE: 18 BRPM | SYSTOLIC BLOOD PRESSURE: 142 MMHG | DIASTOLIC BLOOD PRESSURE: 77 MMHG | BODY MASS INDEX: 37.85 KG/M2 | OXYGEN SATURATION: 95 % | WEIGHT: 264.4 LBS | HEIGHT: 70 IN

## 2024-06-18 DIAGNOSIS — D64.9 ANEMIA, UNSPECIFIED TYPE: ICD-10-CM

## 2024-06-18 DIAGNOSIS — N17.9 AKI (ACUTE KIDNEY INJURY) (CMD): Primary | ICD-10-CM

## 2024-06-18 DIAGNOSIS — E87.5 HYPERKALEMIA: ICD-10-CM

## 2024-06-18 PROCEDURE — 99309 SBSQ NF CARE MODERATE MDM 30: CPT | Performed by: NURSE PRACTITIONER

## 2024-07-02 ENCOUNTER — NURSING HOME VISIT (OUTPATIENT)
Dept: NEPHROLOGY | Age: 79
End: 2024-07-02

## 2024-07-02 VITALS
HEART RATE: 62 BPM | OXYGEN SATURATION: 93 % | DIASTOLIC BLOOD PRESSURE: 72 MMHG | RESPIRATION RATE: 18 BRPM | WEIGHT: 261.3 LBS | TEMPERATURE: 97.5 F | BODY MASS INDEX: 37.49 KG/M2 | SYSTOLIC BLOOD PRESSURE: 136 MMHG

## 2024-07-02 DIAGNOSIS — D64.9 ANEMIA, UNSPECIFIED TYPE: Primary | ICD-10-CM

## 2024-07-02 DIAGNOSIS — N17.9 AKI (ACUTE KIDNEY INJURY) (CMD): ICD-10-CM

## 2024-07-02 DIAGNOSIS — I10 PRIMARY HYPERTENSION: ICD-10-CM

## 2024-07-02 PROCEDURE — 99309 SBSQ NF CARE MODERATE MDM 30: CPT | Performed by: NURSE PRACTITIONER

## 2024-07-08 ENCOUNTER — NURSING HOME VISIT (OUTPATIENT)
Dept: NEPHROLOGY | Age: 79
End: 2024-07-08

## 2024-07-08 VITALS
OXYGEN SATURATION: 97 % | HEART RATE: 77 BPM | RESPIRATION RATE: 19 BRPM | BODY MASS INDEX: 35.47 KG/M2 | SYSTOLIC BLOOD PRESSURE: 132 MMHG | TEMPERATURE: 97 F | DIASTOLIC BLOOD PRESSURE: 76 MMHG | WEIGHT: 247.2 LBS

## 2024-07-08 DIAGNOSIS — D64.9 ANEMIA, UNSPECIFIED TYPE: ICD-10-CM

## 2024-07-08 DIAGNOSIS — N17.9 AKI (ACUTE KIDNEY INJURY) (CMD): ICD-10-CM

## 2024-07-08 DIAGNOSIS — N13.30 HYDRONEPHROSIS, BILATERAL: Primary | ICD-10-CM

## 2024-07-08 PROCEDURE — 99309 SBSQ NF CARE MODERATE MDM 30: CPT | Performed by: NURSE PRACTITIONER

## 2024-07-31 ENCOUNTER — NURSING HOME VISIT (OUTPATIENT)
Dept: NEPHROLOGY | Age: 79
End: 2024-07-31

## 2024-07-31 VITALS
SYSTOLIC BLOOD PRESSURE: 134 MMHG | BODY MASS INDEX: 31.42 KG/M2 | TEMPERATURE: 96 F | HEART RATE: 53 BPM | OXYGEN SATURATION: 98 % | RESPIRATION RATE: 16 BRPM | DIASTOLIC BLOOD PRESSURE: 60 MMHG | WEIGHT: 219 LBS

## 2024-07-31 DIAGNOSIS — I87.2 VENOUS (PERIPHERAL) INSUFFICIENCY: ICD-10-CM

## 2024-07-31 DIAGNOSIS — D64.9 ANEMIA, UNSPECIFIED TYPE: ICD-10-CM

## 2024-07-31 DIAGNOSIS — N17.9 AKI (ACUTE KIDNEY INJURY) (CMD): Primary | ICD-10-CM

## 2024-08-07 ENCOUNTER — NURSING HOME VISIT (OUTPATIENT)
Dept: NEPHROLOGY | Age: 79
End: 2024-08-07

## 2024-08-07 VITALS
TEMPERATURE: 98.1 F | RESPIRATION RATE: 18 BRPM | DIASTOLIC BLOOD PRESSURE: 48 MMHG | OXYGEN SATURATION: 96 % | BODY MASS INDEX: 31.45 KG/M2 | HEART RATE: 50 BPM | SYSTOLIC BLOOD PRESSURE: 127 MMHG | WEIGHT: 219.2 LBS

## 2024-08-07 DIAGNOSIS — D64.9 ANEMIA, UNSPECIFIED TYPE: ICD-10-CM

## 2024-08-07 DIAGNOSIS — R60.0 EDEMA OF BOTH LEGS: Primary | ICD-10-CM

## 2024-08-14 ENCOUNTER — TELEPHONE (OUTPATIENT)
Dept: OPTOMETRY | Age: 79
End: 2024-08-14

## 2024-09-23 ENCOUNTER — TELEPHONE (OUTPATIENT)
Dept: OPTOMETRY | Age: 79
End: 2024-09-23

## 2024-09-30 ENCOUNTER — APPOINTMENT (OUTPATIENT)
Dept: OPTOMETRY | Age: 79
End: 2024-09-30

## 2024-10-14 ENCOUNTER — APPOINTMENT (OUTPATIENT)
Dept: OPTOMETRY | Age: 79
End: 2024-10-14

## 2024-10-21 ENCOUNTER — TELEPHONE (OUTPATIENT)
Dept: OPTOMETRY | Age: 79
End: 2024-10-21

## (undated) NOTE — LETTER
Date: 2022  Patient name: Mani Christiansen  YOB: 1945  Medical Record Number: [id number]  Coverage: Payor: MEDICARE / Plan: MEDICARE PART A&B / Product Type: *No Product type* /   Insurance ID: [id number]  Patient Address: [address] [place] [address]  Telephone Information:   Home Phone [phone]   Work Phone [phone]       Encounter Date: 2022  Physician: Tomasz Davis MD  Diagnosis: Non-pressure chronic ulcer of right calf with fat layer exposed ([address]  (primary encounter diagnosis)  Non-pressure chronic ulcer of left calf with fat layer exposed ([address]  Chronic venous hypertension (idiopathic) with ulcer and inflammation of bilateral lower extremity ([address]  Lymphedema  Infected wound    Wound 21 #1 Right lower leg Venous Ulcer Leg Right; Lower (Active)   Date First Assessed/Time First Assessed: 21 1503    Wound Number (Wound Clinic Only): #1 Right lower leg  Primary Wound Type: Venous Ulcer  Location: Leg  Wound Location Orientation: Right; Lower      Assessments 2021  3:17 PM 2022  9:15 AM   Wound Image        Drainage Amount Unable to assess --   Treatments Compression; Topical (Barrier/Moisturizer/Ointment) --   Dressing Other;Tape --   Wound Length (cm) 14 cm --   Wound Width (cm) 8.5 cm --   Wound Surface Area (cm^2) 119 cm^2 --   Wound Depth (cm) 0.1 cm --   Wound Volume (cm^3) 11.9 cm^3 --   Margins Undefined edges --   Non-staged Wound Description Full thickness --   Abigail-wound Assessment Dry;Edema; Hyperpigmented --   Wound Granulation Tissue Firm;Pink --   Wound Bed Granulation (%) 5 % --   Wound Bed Epithelium (%) 50 % --   Wound Bed Slough (%) 45 % --   Wound Odor None --   Shape Measured in a cluster --       Inactive Orders   Date Order Priority Status Authorizing Provider   22 Debridement Routine Completed Neal Gasca MD   10/07/22 6386 Debridement Routine Completed Neal Gasca MD   22 4116 Debridement Routine Completed Jose Lagos MD   08/12/22 3106 Debridement Routine Completed Jose Lagos MD   10/29/21 4290 Debridement Routine Completed Jose Lagos MD   09/24/21 0901 Debridement Routine Completed Jose Laogs MD   08/06/21 1609 OP WOUND DRESSING Routine Completed Jose Lagos MD     - Cleansing:    Cleanse with normal saline or wound cleanser     - Dressing:    Endoform collagen     - Dressing:    Hydrofera transfer     - Dressing:    Kerramax/Super absorbent     - Additional Wound Dressing Information:    zinc periwound       Wound 08/06/21 #2 left lower leg Venous Ulcer Leg Left; Lower (Active)   Date First Assessed/Time First Assessed: 08/06/21 1504    Wound Number (Wound Clinic Only): #2 left lower leg  Primary Wound Type: Venous Ulcer  Location: Leg  Wound Location Orientation: Left; Lower      Assessments 8/6/2021  3:15 PM 11/4/2022  9:14 AM   Wound Image         Drainage Amount Unable to assess --   Treatments Compression; Topical (Barrier/Moisturizer/Ointment) --   Dressing Other;Tape;Kerlix roll --   Wound Length (cm) 15.5 cm --   Wound Width (cm) 16.5 cm --   Wound Surface Area (cm^2) 255.75 cm^2 --   Wound Depth (cm) 0.1 cm --   Wound Volume (cm^3) 25.58 cm^3 --   Margins Undefined edges --   Non-staged Wound Description Full thickness --   Abigail-wound Assessment Edema; Hyperpigmented --   Wound Bed Epithelium (%) 70 % --   Wound Bed Slough (%) 30 % --   Wound Odor None --   Shape measured in a cluster --       Inactive Orders   Date Order Priority Status Authorizing Provider   11/04/22 0364 Debridement Routine Completed Jose Lagos MD   10/07/22 7271 Debridement Routine Completed Jose Lagos MD   09/09/22 5460 Debridement Routine Completed Jose Lagos MD   08/12/22 0920 Debridement Routine Completed Jose Lagos MD   07/15/22 0944 Debridement Routine Completed Jose Lagos MD   10/29/21 0940 Debridement Routine Completed Sravanthi Spann MD   09/24/21 2401 Debridement Routine Completed Sravanthi Spann MD   08/06/21 1609 OP WOUND DRESSING Routine Completed Sravanthi Spann MD     - Cleansing:    Cleanse with normal saline or wound cleanser     - Dressing:    Endoform collagen     - Dressing:    Hydrofera transfer     - Dressing:    Kerramax/Super absorbent     - Additional Wound Dressing Information:    zinc periwound       Wound Number: All wounds    Wound Cleaning and Dressings:  Showering directions: May shower with protection  Wound cleansing:  Cleanse with normal saline or wound cleanser and Cleanse with Dakins solution  Wound cleaning frequency: Daily  Wound product: Xeroform gauze, Kerramax/super absorbent, ABD pad, Conforming roll, Paper tape and Other gentamycin to open areas  Dressing change frequency:  Change dressing daily and/or PRN  Enzymatic agent:  Not applicable    Ace wrap    Miscellaneous/Additional Orders:  Miscellaneous orders: Home health care nurse for wound care    Care Summary:  Care Summary: Discussed Plan of Care at beside with patient. Patient verbally acknowledges understanding of all instructions and all questions were answered. Follow Up:  Return in about 4 weeks (around 12/2/2022) for Wound followup.

## (undated) NOTE — LETTER
Date: 10/27/2023  Patient name: Shirley Hudson  YOB: 1945  Medical Record Number: [id number]  Primary Coverage: Payor: MEDICARE / Plan: MEDICARE PART A&B / Product Type: *No Product type* /   Secondary Coverage: [place] ID: [id number]  Patient Address: [address] [place] [address]  Telephone Information:   Home Phone [phone]   Work Phone [phone]       Encounter Date: 10/27/2023  Provider: Zakia Parekh MD  Diagnosis: (G29.142) Non-pressure chronic ulcer of right calf with fat layer exposed ([address]  (primary encounter diagnosis)  (R36.338) Non-pressure chronic ulcer of left calf with fat layer exposed ([address]  (I87.333) Chronic venous hypertension (idiopathic) with ulcer and inflammation of bilateral lower extremity ([address]  (I89.0) Lymphedema  (T14.8XXA,  L08.9) Infected wound      Wound 08/06/21 #1 Right lower leg Venous Ulcer Leg Right; Lower (Active)   Date First Assessed/Time First Assessed: 08/06/21 1503    Wound Number (Wound Clinic Only): #1 Right lower leg  Primary Wound Type: Venous Ulcer  Location: Leg  Wound Location Orientation: Right; Lower      Assessments 8/6/2021  3:17 PM 10/27/2023  8:48 AM   Wound Image          Drainage Amount Unable to assess Copious   Drainage Description -- Serous; Yellow   Treatments Compression; Topical (Barrier/Moisturizer/Ointment) Dakins   Dressing Other;Tape --   Wound Length (cm) 14 cm 23 cm   Wound Width (cm) 8.5 cm 30 cm   Wound Surface Area (cm^2) 119 cm^2 690 cm^2   Wound Depth (cm) 0.1 cm 0.2 cm   Wound Volume (cm^3) 11.9 cm^3 138 cm^3   Wound Healing % -- -1060   Margins Undefined edges Well-defined edges   Non-staged Wound Description Full thickness Full thickness   Abigail-wound Assessment Dry;Edema; Hyperpigmented Maceration;Moist;Edema   Wound Granulation Tissue Firm;Pink Pink;Firm   Wound Bed Granulation (%) 5 % 40 %   Wound Bed Epithelium (%) 50 % 20 %   Wound Bed Slough (%) 45 % 40 %   Wound Odor None Mild   Shape Measured in a cluster bridged       Inactive Orders   Date Order Priority Status Authorizing Provider   08/04/23 1427 Debridement Routine Completed Calderon Wild MD   06/16/23 4906 Debridement Routine Completed Calderon Wild MD   05/05/23 8352 Debridement Routine Completed Calderon Wild MD   03/24/23 1115 Debridement Routine Completed Calderon Wild MD   01/27/23 1040 Debridement Routine Completed Calderon Wild MD   12/30/22 1047 Debridement Routine Completed Calderon Wild MD   12/02/22 9886 Debridement Routine Completed Calderon Wild MD   11/04/22 0920 Debridement Routine Completed Calderon Wild MD   10/07/22 7984 Debridement Routine Completed Calderon Wild MD   09/09/22 9332 Debridement Routine Completed Calderon Wild MD   08/12/22 0808 Debridement Routine Completed Calderon Wild MD   10/29/21 4344 Debridement Routine Completed Calderon Wild MD   09/24/21 0901 Debridement Routine Completed Calderon Wild MD   08/06/21 1609 OP Wound Dressing Routine Completed Calderon Wild MD     - Cleansing:    Cleanse with normal saline or wound cleanser     - Dressing:    Endoform collagen     - Dressing:    Hydrofera transfer     - Dressing:    Kerramax/Super absorbent     - Additional Wound Dressing Information:    zinc periwound       Wound 08/06/21 #2 left lower leg Venous Ulcer Leg Left; Lower (Active)   Date First Assessed/Time First Assessed: 08/06/21 1504    Wound Number (Wound Clinic Only): #2 left lower leg  Primary Wound Type: Venous Ulcer  Location: Leg  Wound Location Orientation: Left; Lower      Assessments 8/6/2021  3:15 PM 10/27/2023  8:47 AM   Wound Image        Drainage Amount Unable to assess Copious   Drainage Description -- Serous; Yellow   Treatments Compression; Topical (Barrier/Moisturizer/Ointment) Dakins   Dressing Other;Tape;Kerlix roll --   Wound Length (cm) 15.5 cm 23.8 cm   Wound Width (cm) 16.5 cm 25 cm   Wound Surface Area (cm^2) 255.75 cm^2 595 cm^2   Wound Depth (cm) 0.1 cm 0.2 cm   Wound Volume (cm^3) 25.58 cm^3 119 cm^3   Wound Healing % -- -365   Margins Undefined edges Well-defined edges   Non-staged Wound Description Full thickness Full thickness   Abigail-wound Assessment Edema; Hyperpigmented Maceration;Moist;Edema   Wound Granulation Tissue -- Pink;Firm   Wound Bed Granulation (%) -- 40 %   Wound Bed Epithelium (%) 70 % 20 %   Wound Bed Slough (%) 30 % 40 %   Wound Odor None Mild   Shape measured in a cluster bridged       Inactive Orders   Date Order Priority Status Authorizing Provider   08/04/23 8768 Debridement Routine Completed Dayanara Robbins MD   06/16/23 0275 Debridement Routine Completed Dayanara Robbins MD   05/05/23 0779 Debridement Routine Completed Dayanara Robbins MD   03/24/23 1117 Debridement Routine Completed Dayanara Robbins MD   01/27/23 0292 Debridement Routine Completed Dayanara Robbins MD   12/30/22 1048 Debridement Routine Completed Dayanara Robbins MD   12/02/22 0915 Debridement Routine Completed Dayanara Robbins MD   11/04/22 0016 Debridement Routine Completed Dayanara Robbins MD   10/07/22 2620 Debridement Routine Completed Dayanara Robbins MD   09/09/22 0836 Debridement Routine Completed Dayanara Robbins MD   08/12/22 0920 Debridement Routine Completed Dayanara Robbins MD   07/15/22 9177 Debridement Routine Completed Dayanara Robbins MD   10/29/21 0940 Debridement Routine Completed Dayanara Robbins MD   09/24/21 9090 Debridement Routine Completed Dayanara Robbins MD   08/06/21 1609 OP Wound Dressing Routine Completed Dayanara Robbins MD     - Cleansing:    Cleanse with normal saline or wound cleanser     - Dressing:    Endoform collagen     - Dressing:    Hydrofera transfer     - Dressing:    Kerramax/Super absorbent     - Additional Wound Dressing Information:    zinc periwound     Wound Treatment Orders:  No orders of the defined types were placed in this encounter. Wound Cleaning and Dressings:  Showering directions: May shower with protection  Wound cleansing:  Cleanse with normal saline or wound cleanser  Wound cleaning frequency:  with dressing changes  Wound product: Xeroform gauze, ABD pad, Kerramax/ABD pad, Kerlix, and Paper tape  Dressing change frequency:  Change dressing 3x per week  Enzymatic agent:  Not applicable           Compression Therapy: Ace wrap     Miscellaneous/Additional Orders:  Miscellaneous orders: Home health care nurse for wound care     Care Summary:  Care Summary: Discussed Plan of Care at beside with patient. Patient verbally acknowledges understanding of all instructions and all questions were answered. Additional Notes:  HH to see patient three times per week for dressing changes. Continue xeroform, ABD pads, kerlix, and ACE wraps. Soak with Dakins before redressing. Wounds were cultured this visit. Follow Up:  Return in about 4 weeks (around 11/24/2023) for Wound followup.

## (undated) NOTE — LETTER
Date: 9/10/2021  Patient name: Keith Zhong  YOB: 1945  Medical Record Number: [id number]  Coverage: Payor: MEDICARE / Plan: MEDICARE PART A&B / Product Type: *No Product type* /   Insurance ID: [id number]  Patient Address: [address] [place] [address] cluster       Inactive Orders   Date Order Priority Status Authorizing Provider   08/06/21 8700 OP WOUND DRESSING Routine Completed Juliana Mclaughlin MD     - Cleansing:    Cleanse with normal saline or wound cleanser     - Dressing:    Endoform collage Kerramax/Super absorbent     - Additional Wound Dressing Information:    zinc periwound       Compression Wrap 08/06/21 Leg Right (Active)   Placement Date/Time: 08/06/21 7601   Location: Leg  Wound Location Orientation: Right      Assessments 8/6/2021  4: dressing changes.     Additional home health DME: No

## (undated) NOTE — LETTER
Date: 1/14/2022  Patient name: Marjan Fields  YOB: 1945  Medical Record Number: [id number]  Coverage: Payor: MEDICARE / Plan: MEDICARE PART A&B / Product Type: *No Product type* /   Insurance ID: [id number]  Patient Address: [address] [place] [address] or wound cleanser     - Dressing:    Endoform collagen     - Dressing:    Hydrofera transfer     - Dressing:    Kerramax/Super absorbent     - Additional Wound Dressing Information:    zinc periwound       Wound 08/06/21 #2 left lower leg Venous Ulcer Leg 2nd Toe Pressure Injury Toe (Comment which one) Left (Active)   Date First Assessed/Time First Assessed: 11/12/21 0942    Wound Number (Wound Clinic Only): #3 Left 2nd Toe  Primary Wound Type: Pressure Injury  Location: Toe (Comment which one)  Wound Locat

## (undated) NOTE — LETTER
Date: 5/14/2021  Patient name: Miguel Wade  YOB: 1945  Medical Record Number: [id number]  Coverage: Payor: MEDICARE / Plan: MEDICARE PART A&B / Product Type: *No Product type* /   Insurance ID: [id number]  Patient Address: [address] [place] [address] 12/11/20 #19 Right Medial Leg Venous Ulcer Leg Right; Lower;Mid (Active)   Date First Assessed/Time First Assessed: 12/11/20 1002    Wound Number (Wound Clinic Only): #19 Right Medial Leg  Primary Wound Type: Venous Ulcer  Location: Leg  Wound Location Orie Summary:  Care Summary: Discussed Plan of Care at beside with patient. Patient verbally acknowledges understanding of all instructions and all questions were answered.           Follow Up:  Return in about 2 weeks (around 5/28/2021) for follow up two weeks,

## (undated) NOTE — LETTER
Date: 3/24/2023  Patient name: Wyatt Francis  YOB: 1945  Medical Record Number: [id number]  Primary Coverage: Payor: MEDICARE / Plan: MEDICARE PART A&B / Product Type: *No Product type* /   Secondary Coverage: [place] ID: [id number]  Patient Address: [address] [place] [address]  Telephone Information:   Home Phone [phone]   Work Phone [phone]       Encounter Date: 3/24/2023  Provider: Dameon Rhodes MD  Diagnosis: (N47.648) Non-pressure chronic ulcer of right calf with fat layer exposed ([address]  (primary encounter diagnosis)  (V86.462) Non-pressure chronic ulcer of left calf with fat layer exposed ([address]  (I87.333) Chronic venous hypertension (idiopathic) with ulcer and inflammation of bilateral lower extremity ([address]  (I89.0) Lymphedema  (T14.8XXA,  L08.9) Infected wound      Wound 08/06/21 #1 Right lower leg Venous Ulcer Leg Right; Lower (Active)   Date First Assessed/Time First Assessed: 08/06/21 1503    Wound Number (Wound Clinic Only): #1 Right lower leg  Primary Wound Type: Venous Ulcer  Location: Leg  Wound Location Orientation: Right; Lower      Assessments 8/6/2021  3:17 PM 3/24/2023 10:40 AM   Wound Image        Drainage Amount Unable to assess --   Treatments Compression; Topical (Barrier/Moisturizer/Ointment) --   Dressing Other;Tape --   Wound Length (cm) 14 cm --   Wound Width (cm) 8.5 cm --   Wound Surface Area (cm^2) 119 cm^2 --   Wound Depth (cm) 0.1 cm --   Wound Volume (cm^3) 11.9 cm^3 --   Margins Undefined edges --   Non-staged Wound Description Full thickness --   Abigail-wound Assessment Dry;Edema; Hyperpigmented --   Wound Granulation Tissue Firm;Pink --   Wound Bed Granulation (%) 5 % --   Wound Bed Epithelium (%) 50 % --   Wound Bed Slough (%) 45 % --   Wound Odor None --   Shape Measured in a cluster --       Inactive Orders   Date Order Priority Status Authorizing Provider   03/24/23 1115 Debridement Routine Completed Juanita Zamora MD   01/27/23 2363 Debridement Routine Completed Juanita Zamora MD   12/30/22 1047 Debridement Routine Completed Juanita Zamora MD   12/02/22 3262 Debridement Routine Completed Juanita Zamora MD   11/04/22 3168 Debridement Routine Completed Juanita Zamora MD   10/07/22 0495 Debridement Routine Completed Juanita Zamora MD   09/09/22 8326 Debridement Routine Completed Juanita Zamora MD   08/12/22 4192 Debridement Routine Completed Juanita Zamora MD   10/29/21 3085 Debridement Routine Completed Junaita Zamora MD   09/24/21 0901 Debridement Routine Completed Juanita Zamora MD   08/06/21 1609 OP WOUND DRESSING Routine Completed Juanita Zamora MD     - Cleansing:    Cleanse with normal saline or wound cleanser     - Dressing:    Endoform collagen     - Dressing:    Hydrofera transfer     - Dressing:    Kerramax/Super absorbent     - Additional Wound Dressing Information:    zinc periwound       Wound 08/06/21 #2 left lower leg Venous Ulcer Leg Left; Lower (Active)   Date First Assessed/Time First Assessed: 08/06/21 1504    Wound Number (Wound Clinic Only): #2 left lower leg  Primary Wound Type: Venous Ulcer  Location: Leg  Wound Location Orientation: Left; Lower      Assessments 8/6/2021  3:15 PM 3/24/2023 11:02 AM   Wound Image         Drainage Amount Unable to assess --   Treatments Compression; Topical (Barrier/Moisturizer/Ointment) --   Dressing Other;Tape;Kerlix roll --   Wound Length (cm) 15.5 cm --   Wound Width (cm) 16.5 cm --   Wound Surface Area (cm^2) 255.75 cm^2 --   Wound Depth (cm) 0.1 cm --   Wound Volume (cm^3) 25.58 cm^3 --   Margins Undefined edges --   Non-staged Wound Description Full thickness --   Abigail-wound Assessment Edema; Hyperpigmented --   Wound Bed Epithelium (%) 70 % --   Wound Bed Slough (%) 30 % --   Wound Odor None --   Shape measured in a cluster --       Inactive Orders   Date Order Priority Status Authorizing Provider   03/24/23 1117 Debridement Routine Completed Ena Farrell MD   01/27/23 2700 Debridement Routine Completed Ena Farrell MD   12/30/22 1048 Debridement Routine Completed Ena Farrell MD   12/02/22 8832 Debridement Routine Completed Ena Farrell MD   11/04/22 2129 Debridement Routine Completed Ena Farrell MD   10/07/22 4253 Debridement Routine Completed Ena Farrell MD   09/09/22 7528 Debridement Routine Completed Ena Farrell MD   08/12/22 3170 Debridement Routine Completed Ena Farrell MD   07/15/22 3163 Debridement Routine Completed Ena Farrell MD   10/29/21 0940 Debridement Routine Completed Ena Farrell MD   09/24/21 9317 Debridement Routine Completed Ena Farrell MD   08/06/21 1609 OP WOUND DRESSING Routine Completed Ena Farrell MD     - Cleansing:    Cleanse with normal saline or wound cleanser     - Dressing:    Endoform collagen     - Dressing:    Hydrofera transfer     - Dressing:    Kerramax/Super absorbent     - Additional Wound Dressing Information:    zinc periwound   Wound Number: All wounds    Wound Cleaning and Dressings:  Showering directions: May shower with protection  Wound cleansing:  Cleanse with normal saline or wound cleanser and Cleanse with Dakins solution  Wound cleaning frequency: Daily  Wound product: Xeroform gauze, ABD pad, Kerlix and Paper tape  Dressing change frequency:  Change dressing daily and/or PRN  Enzymatic agent:  Not applicable    Compression Therapy: Ace wrap    Miscellaneous/Additional Orders:  Miscellaneous orders: Home health care nurse for wound care    Care Summary:  Care Summary: Discussed Plan of Care at beside with patient. Patient verbally acknowledges understanding of all instructions and all questions were answered. Follow Up:  Return in about 6 weeks (around 5/5/2023) for Wound followup.

## (undated) NOTE — LETTER
Date: 11/12/2021  Patient name: Nova Parker  YOB: 1945  Medical Record Number: [id number]  Coverage: Payor: MEDICARE / Plan: MEDICARE PART A&B / Product Type: *No Product type* /   Insurance ID: [id number]  Patient Address: [facility] [place]       Inactive Orders   Date Order Priority Status Authorizing Provider   10/29/21 0899 Debridement Routine Completed Kota Fitzgerald MD   09/24/21 0901 Debridement Routine Completed Kota Fitzgerald MD   08/06/21 5034 OP WOUND Marivel Ortiz MD   08/06/21 2373 OP WOUND DRESSING Routine Completed Javed Mallory MD     - Cleansing:    Cleanse with normal saline or wound cleanser     - Dressing:    Endoform collagen     - Dressing:    Hydrofera transfer     - Dressing:    Kerramax/ Wound Bed Granulation (%) 100 %   Wound Odor None   Pressure Injury Stage Stage 2       No Linked orders to display       Wound Number:  All wounds    Wound Cleaning and Dressings:  Showering directions: May shower with protection  Wound cleansing:  Clean

## (undated) NOTE — LETTER
Date: 10/15/2021  Patient name: Rain Benton  YOB: 1945  Medical Record Number: [id number]  Coverage: Payor: MEDICARE / Plan: MEDICARE PART A&B / Product Type: *No Product type* /   Insurance ID: [id number]  Patient Address: [facility] Dressing:    Endoform collagen     - Dressing:    Hydrofera transfer     - Dressing:    Kerramax/Super absorbent     - Additional Wound Dressing Information:    zinc periwound       Wound 08/06/21 #2 left lower leg Venous Ulcer Leg Left; Lower (Active)   Da Leg  Wound Location Orientation: Right      Assessments 8/6/2021  4:00 PM 9/24/2021  8:39 AM   Response to Treatment Well tolerated Well tolerated   Compression Layers 2 2   Compression Product Type Coflex Coflex   Dressing Applied Yes Yes   Compression Wr

## (undated) NOTE — LETTER
Date: 12/2/2022  Patient name: Bernard Hennessy  YOB: 1945  Medical Record Number: [id number]  Coverage: Payor: MEDICARE / Plan: MEDICARE PART A&B / Product Type: *No Product type* /   Insurance ID: [id number]  Patient Address: [address] [place] [address]  Telephone Information:   Home Phone [phone]   Work Phone [phone]       Encounter Date: 12/2/2022  Physician: Gayatri Taylor MD  Diagnosis: Chronic venous hypertension (idiopathic) with ulcer and inflammation of bilateral lower extremity ([address]  (primary encounter diagnosis)  Non-pressure chronic ulcer of right calf with fat layer exposed ([address]  Non-pressure chronic ulcer of left calf with fat layer exposed ([address]  Lymphedema    Wound 08/06/21 #1 Right lower leg Venous Ulcer Leg Right; Lower (Active)   Date First Assessed/Time First Assessed: 08/06/21 1503    Wound Number (Wound Clinic Only): #1 Right lower leg  Primary Wound Type: Venous Ulcer  Location: Leg  Wound Location Orientation: Right; Lower      Assessments 8/6/2021  3:17 PM 12/2/2022  8:42 AM   Wound Image          Drainage Amount Unable to assess Copious   Drainage Description -- Serous;Green   Treatments Compression; Topical (Barrier/Moisturizer/Ointment) Compression   Dressing Other;Tape Kerlix roll;ABD Pad;Xeroform   Wound Length (cm) 14 cm 23 cm   Wound Width (cm) 8.5 cm 34.2 cm   Wound Surface Area (cm^2) 119 cm^2 786.6 cm^2   Wound Depth (cm) 0.1 cm 0.2 cm   Wound Volume (cm^3) 11.9 cm^3 157.32 cm^3   Wound Healing % -- -1222   Margins Undefined edges Well-defined edges   Non-staged Wound Description Full thickness Full thickness   Abigail-wound Assessment Dry;Edema; Hyperpigmented Edema; Moist;Hemosiderin staining; Maceration   Wound Granulation Tissue Firm;Pink Pink;Pale Grey;Firm   Wound Bed Granulation (%) 5 % 50 %   Wound Bed Epithelium (%) 50 % 15 %   Wound Bed Slough (%) 45 % 35 %   Wound Odor None None   Shape Measured in a cluster --       Inactive Orders   Date Order Priority Status Authorizing Provider   12/02/22 5052 Debridement Routine Completed Humera Deras MD   11/04/22 2137 Debridement Routine Completed Humera Deras MD   10/07/22 2106 Debridement Routine Completed Humera Deras MD   09/09/22 0704 Debridement Routine Completed Humera Deras MD   08/12/22 2837 Debridement Routine Completed Humera Deras MD   10/29/21 2624 Debridement Routine Completed Humera Deras MD   09/24/21 0901 Debridement Routine Completed Humera Deras MD   08/06/21 1609 OP WOUND DRESSING Routine Completed Humera Deras MD     - Cleansing:    Cleanse with normal saline or wound cleanser     - Dressing:    Endoform collagen     - Dressing:    Hydrofera transfer     - Dressing:    Kerramax/Super absorbent     - Additional Wound Dressing Information:    zinc periwound       Wound 08/06/21 #2 left lower leg Venous Ulcer Leg Left; Lower (Active)   Date First Assessed/Time First Assessed: 08/06/21 1504    Wound Number (Wound Clinic Only): #2 left lower leg  Primary Wound Type: Venous Ulcer  Location: Leg  Wound Location Orientation: Left; Lower      Assessments 8/6/2021  3:15 PM 12/2/2022  8:41 AM   Wound Image          Drainage Amount Unable to assess Copious   Drainage Description -- Serous;Green   Treatments Compression; Topical (Barrier/Moisturizer/Ointment) Compression   Dressing Other;Tape;Kerlix roll ABD Pad;Kerlix roll;Xeroform   Wound Length (cm) 15.5 cm 24.6 cm   Wound Width (cm) 16.5 cm 35 cm   Wound Surface Area (cm^2) 255.75 cm^2 861 cm^2   Wound Depth (cm) 0.1 cm 0.2 cm   Wound Volume (cm^3) 25.58 cm^3 172.2 cm^3   Wound Healing % -- -573   Margins Undefined edges Well-defined edges   Non-staged Wound Description Full thickness Full thickness   Abigail-wound Assessment Edema; Hyperpigmented Edema; Hemosiderin staining;Moist;Maceration   Wound Granulation Tissue -- Firm;Pink;Pale Grey   Wound Bed Granulation (%) -- 50 %   Wound Bed Epithelium (%) 70 % 15 %   Wound Bed Slough (%) 30 % 35 %   Wound Odor None None   Shape measured in a cluster --       Inactive Orders   Date Order Priority Status Authorizing Provider   12/02/22 0915 Debridement Routine Completed Oren Bennett, MD   11/04/22 1342 Debridement Routine Completed Oren Bennett, MD   10/07/22 1431 Debridement Routine Completed Oren Bennett, MD   09/09/22 8265 Debridement Routine Completed Oren Bennett, MD   08/12/22 0920 Debridement Routine Completed Oren Bennett, MD   07/15/22 5402 Debridement Routine Completed Oren Bennett, MD   10/29/21 0940 Debridement Routine Completed Oren Bennett, MD   09/24/21 9914 Debridement Routine Completed Oren Bennett, MD   08/06/21 1609 OP WOUND DRESSING Routine Completed Oren Bennett MD     - Cleansing:    Cleanse with normal saline or wound cleanser     - Dressing:    Endoform collagen     - Dressing:    Hydrofera transfer     - Dressing:    Kerramax/Super absorbent     - Additional Wound Dressing Information:    zinc periwound       Wound Number: All wounds    Wound Cleaning and Dressings:  Showering directions: May shower with protection  Wound cleansing:  Cleanse with normal saline or wound cleanser  Wound cleaning frequency: with dressing changes  Wound product: Xeroform gauze, ABD pad, Kerlix and Paper tape  Dressing change frequency:  Change dressing 3x per week  Enzymatic agent:  Not applicable    Compression Therapy: Ace wrap        Miscellaneous/Additional Orders:  Miscellaneous orders: Home health care nurse for wound care    Care Summary:  Care Summary: Discussed Plan of Care at beside with patient. Patient verbally acknowledges understanding of all instructions and all questions were answered. Follow Up:  Return in about 4 weeks (around 12/30/2022) for Wound followup. Additional Notes:  HH to see pt three times per week for dressing changes.  Gentamicin ointment, xeroform, ABD pads, kerlix, ACE bandages to both leg wounds.      Additional home health DME: No

## (undated) NOTE — LETTER
Date: 8/6/2021  Patient name: uLma Knutson  YOB: 1945  Medical Record Number: [id number]  Coverage: Payor: MEDICARE / Plan: MEDICARE PART A&B / Product Type: *No Product type* /   Insurance ID: [id number]  Patient Address: [address] [place] [address] Epithelium (%) 70 %   Wound Bed Slough (%) 30 %   Wound Odor None   Shape measured in a cluster       No Linked orders to display       Wound Number:  All wounds    Wound Cleaning and Dressings:  Showering directions: May shower with protection  Wound clean

## (undated) NOTE — LETTER
Date: 1/27/2023  Patient name: Anel Spence  YOB: 1945  Medical Record Number: [id number]  Primary Coverage: Payor: MEDICARE / Plan: MEDICARE PART A&B / Product Type: *No Product type* /   Secondary Coverage: [place] ID: [id number]  Patient Address: [address] [place] [address]  Telephone Information:   Home Phone [phone]   Work Phone [phone]       Encounter Date: 1/27/2023  Physician: Jonh Winters MD  Diagnosis: (S09.555) Non-pressure chronic ulcer of right calf with fat layer exposed ([address]  (primary encounter diagnosis)  (L80.537) Non-pressure chronic ulcer of left calf with fat layer exposed ([address]  (I87.333,  L97.919,  L97.929) Chronic venous hypertension (idiopathic) with ulcer and inflammation of bilateral lower extremity (HCC)  (I89.0) Lymphedema  (T14.8XXA,  L08.9) Infected wound  (B96.5) Pseudomonas (aeruginosa) (mallei) (pseudomallei) as the cause of diseases classified elsewhere  (A49.02) MRSA (methicillin resistant Staphylococcus aureus) infection      Wound 08/06/21 #1 Right lower leg Venous Ulcer Leg Right; Lower (Active)   Date First Assessed/Time First Assessed: 08/06/21 1503    Wound Number (Wound Clinic Only): #1 Right lower leg  Primary Wound Type: Venous Ulcer  Location: Leg  Wound Location Orientation: Right; Lower      Assessments 8/6/2021  3:17 PM 1/27/2023  9:18 AM   Wound Image        Drainage Amount Unable to assess --   Treatments Compression; Topical (Barrier/Moisturizer/Ointment) --   Dressing Other;Tape --   Wound Length (cm) 14 cm --   Wound Width (cm) 8.5 cm --   Wound Surface Area (cm^2) 119 cm^2 --   Wound Depth (cm) 0.1 cm --   Wound Volume (cm^3) 11.9 cm^3 --   Margins Undefined edges --   Non-staged Wound Description Full thickness --   Abigail-wound Assessment Dry;Edema; Hyperpigmented --   Wound Granulation Tissue Firm;Pink --   Wound Bed Granulation (%) 5 % --   Wound Bed Epithelium (%) 50 % --   Wound Bed Slough (%) 45 % --   Wound Odor None --   Shape Measured in a cluster --       Active Orders   Date Order Priority Status Authorizing Provider   01/27/23 2130 Debridement Routine Active Brooklyn Jessa Bird MD       Inactive Orders   Date Order Priority Status Authorizing Provider   12/30/22 1047 Debridement Routine Completed Terry Maciel MD   12/02/22 6767 Debridement Routine Completed Terry Maciel MD   11/04/22 0920 Debridement Routine Completed Terry Maciel MD   10/07/22 8760 Debridement Routine Completed Terry Maciel MD   09/09/22 7046 Debridement Routine Completed Terry Maciel MD   08/12/22 7111 Debridement Routine Completed Terry Maciel MD   10/29/21 2464 Debridement Routine Completed Terry Maciel MD   09/24/21 0901 Debridement Routine Completed Terry Maciel MD   08/06/21 1609 OP WOUND DRESSING Routine Completed Terry Maciel MD     - Cleansing:    Cleanse with normal saline or wound cleanser     - Dressing:    Endoform collagen     - Dressing:    Hydrofera transfer     - Dressing:    Kerramax/Super absorbent     - Additional Wound Dressing Information:    zinc periwound       Wound 08/06/21 #2 left lower leg Venous Ulcer Leg Left; Lower (Active)   Date First Assessed/Time First Assessed: 08/06/21 1504    Wound Number (Wound Clinic Only): #2 left lower leg  Primary Wound Type: Venous Ulcer  Location: Leg  Wound Location Orientation: Left; Lower      Assessments 8/6/2021  3:15 PM 1/27/2023  9:19 AM   Wound Image         Drainage Amount Unable to assess --   Treatments Compression; Topical (Barrier/Moisturizer/Ointment) --   Dressing Other;Tape;Kerlix roll --   Wound Length (cm) 15.5 cm --   Wound Width (cm) 16.5 cm --   Wound Surface Area (cm^2) 255.75 cm^2 --   Wound Depth (cm) 0.1 cm --   Wound Volume (cm^3) 25.58 cm^3 --   Margins Undefined edges --   Non-staged Wound Description Full thickness --   Abigail-wound Assessment Edema; Hyperpigmented --   Wound Bed Epithelium (%) 70 % --   Wound Bed Slough (%) 30 % --   Wound Odor None --   Shape measured in a cluster --       Active Orders   Date Order Priority Status Authorizing Provider   01/27/23 6341 Debridement Routine Active Brooklyn Liliana Price MD       Inactive Orders   Date Order Priority Status Authorizing Provider   12/30/22 1048 Debridement Routine Completed Iveth Barton MD   12/02/22 0915 Debridement Routine Completed Iveth Barton MD   11/04/22 9674 Debridement Routine Completed Iveth Barton MD   10/07/22 1650 Debridement Routine Completed Iveth Barton MD   09/09/22 4299 Debridement Routine Completed Iveth Barton MD   08/12/22 0920 Debridement Routine Completed Iveth Barton MD   07/15/22 0944 Debridement Routine Completed Iveth Barton MD   10/29/21 0940 Debridement Routine Completed Iveth Barton MD   09/24/21 0902 Debridement Routine Completed Iveth Barton MD   08/06/21 1609 OP WOUND DRESSING Routine Completed Iveth Barton MD     - Cleansing:    Cleanse with normal saline or wound cleanser     - Dressing:    Endoform collagen     - Dressing:    Hydrofera transfer     - Dressing:    Kerramax/Super absorbent     - Additional Wound Dressing Information:    zinc periwound       Wound 12/30/22 #3 Venous Ulcer Leg Right;Lateral (Active)   Date First Assessed/Time First Assessed: 12/30/22 0844    Wound Number (Wound Clinic Only): #3  Primary Wound Type: Venous Ulcer  Location: Leg  Wound Location Orientation: Right;Lateral      Assessments 12/30/2022  8:46 AM 1/27/2023  8:49 AM   Wound Image       Drainage Amount Unable to assess None   Wound Length (cm) 3 cm 0 cm   Wound Width (cm) 1.9 cm 0 cm   Wound Surface Area (cm^2) 5.7 cm^2 0 cm^2   Wound Depth (cm) 0.1 cm 0 cm   Wound Volume (cm^3) 0.57 cm^3 0 cm^3   Wound Healing % -- 100   Margins Well-defined edges Well-defined edges   Non-staged Wound Description Full thickness Full thickness   Abigail-wound Assessment Edema; Moist Edema   Wound Granulation Tissue Firm;Pale Marvin --   Wound Bed Granulation (%) 80 % --   Wound Bed Epithelium (%) 10 % 100 %   Wound Bed Slough (%) 10 % --   Wound Odor None None       No Linked orders to display       Compression Wrap 12/30/22 Right (Active)   Placement Date: 12/30/22   Wound Location Orientation: Right      Assessments 12/30/2022 11:26 AM   Response to Treatment Well tolerated   Compression Layers 2   Compression Product Type Unna Boot   Dressing Applied Yes   Compression Wrap Location Toes to Knee   Compression Wrap Status Clean;Dry; Intact       No Linked orders to display       Compression Wrap 12/30/22 Left (Active)   Placement Date: 12/30/22   Wound Location Orientation: Left      Assessments 12/30/2022 11:26 AM   Response to Treatment Well tolerated   Compression Layers 2   Compression Product Type Unna Boot   Dressing Applied Yes   Compression Wrap Location Toes to Knee   Compression Wrap Status Clean;Dry; Intact       No Linked orders to display       Wound Number: All wounds    Wound Cleaning and Dressings:  Showering directions: May shower with protection  Wound cleansing:  Cleanse with normal saline or wound cleanser  Wound cleaning frequency: with dressing changes  Wound product: Xeroform gauze, Kerramax/super absorbent, ABD pad and Kerlix  Dressing change frequency:  Change dressing 3x per week  Enzymatic agent:  Not applicable    Miscellaneous/Additional Orders:  Miscellaneous orders: Home health care nurse for wound care    Care Summary:  Care Summary: Discussed Plan of Care at beside with patient. Patient verbally acknowledges understanding of all instructions and all questions were answered. Follow Up:  Return in about 4 weeks (around 2/24/2023) for Wound followup. Additional Notes:  HH to see patient three times per week. Continue gentamycin, xeroform, ABD pads, and kerlix with ACE wraps.      Additional home health DME: No

## (undated) NOTE — LETTER
Date: 6/16/2023  Patient name: Mani Christiansen  YOB: 1945  Medical Record Number: [id number]  Primary Coverage: Payor: MEDICARE / Plan: MEDICARE PART A&B / Product Type: *No Product type* /   Secondary Coverage: [place] ID: [id number]  Patient Address: [address] [place] [address]  Telephone Information:   Home Phone [phone]   Work Phone [phone]       Encounter Date: 6/16/2023  Provider: Tomasz Davis MD  Diagnosis: (D70.995) Non-pressure chronic ulcer of right calf with fat layer exposed ([address]  (primary encounter diagnosis)  (L97.222) Non-pressure chronic ulcer of left calf with fat layer exposed ([address]  (I87.333) Chronic venous hypertension (idiopathic) with ulcer and inflammation of bilateral lower extremity (HCC)  (I89.0) Lymphedema  (T14.8XXA,  L08.9) Infected wound  (B96.5) Pseudomonas (aeruginosa) (mallei) (pseudomallei) as the cause of diseases classified elsewhere  (A49.01) Staph aureus infection  (A49.8) E coli infection      Wound 08/06/21 #1 Right lower leg Venous Ulcer Leg Right; Lower (Active)   Date First Assessed/Time First Assessed: 08/06/21 1503    Wound Number (Wound Clinic Only): #1 Right lower leg  Primary Wound Type: Venous Ulcer  Location: Leg  Wound Location Orientation: Right; Lower      Assessments 8/6/2021  3:17 PM 6/16/2023  8:44 AM   Wound Image        Drainage Amount Unable to assess --   Treatments Compression; Topical (Barrier/Moisturizer/Ointment) --   Dressing Other;Tape --   Wound Length (cm) 14 cm 41 cm   Wound Width (cm) 8.5 cm 29 cm   Wound Surface Area (cm^2) 119 cm^2 1189 cm^2   Wound Depth (cm) 0.1 cm 0.3 cm   Wound Volume (cm^3) 11.9 cm^3 356.7 cm^3   Wound Healing % -- -2897   Margins Undefined edges --   Non-staged Wound Description Full thickness --   Abigail-wound Assessment Dry;Edema; Hyperpigmented --   Wound Granulation Tissue Firm;Pink --   Wound Bed Granulation (%) 5 % --   Wound Bed Epithelium (%) 50 % --   Wound Bed Slough (%) 45 % --   Wound Odor None --   Shape Measured in a cluster --       Active Orders   Date Order Priority Status Authorizing Provider   06/16/23 7334 Debridement Routine Active Brooklyn Martin MD       Inactive Orders   Date Order Priority Status Authorizing Provider   05/05/23 8795 Debridement Routine Completed Oren Bennett MD   03/24/23 1115 Debridement Routine Completed Oren Bennett, MD   01/27/23 1680 Debridement Routine Completed Oren Bennett, MD   12/30/22 1047 Debridement Routine Completed Oren Bennett, MD   12/02/22 9372 Debridement Routine Completed Oren Bennett, MD   11/04/22 0920 Debridement Routine Completed Oren Benentt, MD   10/07/22 0444 Debridement Routine Completed Oren Bennett, MD   09/09/22 1560 Debridement Routine Completed Oren Bennett, MD   08/12/22 3256 Debridement Routine Completed Oren Bennett, MD   10/29/21 5262 Debridement Routine Completed Oren Bennett, MD   09/24/21 0901 Debridement Routine Completed Oren Bennett MD   08/06/21 1609 OP WOUND DRESSING Routine Completed Oren Bennett MD     - Cleansing:    Cleanse with normal saline or wound cleanser     - Dressing:    Endoform collagen     - Dressing:    Hydrofera transfer     - Dressing:    Kerramax/Super absorbent     - Additional Wound Dressing Information:    zinc periwound       Wound 08/06/21 #2 left lower leg Venous Ulcer Leg Left; Lower (Active)   Date First Assessed/Time First Assessed: 08/06/21 1504    Wound Number (Wound Clinic Only): #2 left lower leg  Primary Wound Type: Venous Ulcer  Location: Leg  Wound Location Orientation: Left; Lower      Assessments 8/6/2021  3:15 PM 6/16/2023  8:40 AM   Wound Image         Drainage Amount Unable to assess --   Treatments Compression; Topical (Barrier/Moisturizer/Ointment) --   Dressing Other;Tape;Kerlix roll --   Wound Length (cm) 15.5 cm 36 cm   Wound Width (cm) 16.5 cm 27 cm   Wound Surface Area (cm^2) 255.75 cm^2 972 cm^2   Wound Depth (cm) 0.1 cm 0.3 cm   Wound Volume (cm^3) 25.58 cm^3 291.6 cm^3   Wound Healing % -- -1040   Margins Undefined edges --   Non-staged Wound Description Full thickness --   Abigail-wound Assessment Edema; Hyperpigmented --   Wound Bed Epithelium (%) 70 % --   Wound Bed Slough (%) 30 % --   Wound Odor None --   Shape measured in a cluster --       Active Orders   Date Order Priority Status Authorizing Provider   06/16/23 9004 Debridement Routine Active Brooklyn Guillen MD       Inactive Orders   Date Order Priority Status Authorizing Provider   05/05/23 0410 Debridement Routine Completed Qiana Deleon MD   03/24/23 1117 Debridement Routine Completed Qiana Deleon MD   01/27/23 7755 Debridement Routine Completed Qiana Deleon MD   12/30/22 1048 Debridement Routine Completed Qiana Deleon MD   12/02/22 0915 Debridement Routine Completed Qiana Deleon MD   11/04/22 3394 Debridement Routine Completed Qiana Deleon MD   10/07/22 3667 Debridement Routine Completed Qiana Deleon MD   09/09/22 0165 Debridement Routine Completed Qiana Deleon MD   08/12/22 0920 Debridement Routine Completed Qiana Deleon MD   07/15/22 0944 Debridement Routine Completed Qiana Deleon MD   10/29/21 0940 Debridement Routine Completed Qiana Deleon MD   09/24/21 0254 Debridement Routine Completed Qiana Deleon MD   08/06/21 1609 OP WOUND DRESSING Routine Completed Qiana Deleon MD     - Cleansing:    Cleanse with normal saline or wound cleanser     - Dressing:    Endoform collagen     - Dressing:    Hydrofera transfer     - Dressing:    Kerramax/Super absorbent     - Additional Wound Dressing Information:    zinc periwound        Wound Number:  All wounds     Wound Cleaning and Dressings:  Showering directions: May shower with protection  Wound cleansing:  Cleanse with normal saline or wound cleanser  Wound cleaning frequency:  with dressing changes  Wound product: Xeroform gauze, ABD pad, Kerlix, and Paper tape  Dressing change frequency:  Change dressing 3x per week  Enzymatic agent:  Not applicable           Compression Therapy: Ace wrap     Miscellaneous/Additional Orders:  Miscellaneous orders: Home health care nurse for wound care     Care Summary:  Care Summary: Discussed Plan of Care at beside with patient. Patient verbally acknowledges understanding of all instructions and all questions were answered. Additional Notes:  HH to see patient three times per week for dressing changes. Continue xeroform, ABD pads, kerlix, and ACE wraps. Soak with Dakins before redressing. Wounds were cultured this visit. Follow Up:  Return in about 6 weeks (around 7/28/2023) for Wound followup.

## (undated) NOTE — LETTER
Date: 10/29/2021  Patient name: Satinder Ambrocio  YOB: 1945  Medical Record Number: [id number]  Coverage: Payor: MEDICARE / Plan: MEDICARE PART A&B / Product Type: *No Product type* /   Insurance ID: [id number]  Patient Address: [facility] [address] Debridement Routine Completed Thee Harris MD   09/24/21 5941 Debridement Routine Completed Thee Harris MD   08/06/21 1608 OP WOUND DRESSING Routine Completed Thee Harris MD     - Cleansing:    Cleanse with normal saline or w Date/Time: 08/06/21 1608   Location: Leg  Wound Location Orientation: Right      Assessments 8/6/2021  4:00 PM 9/24/2021  8:39 AM   Response to Treatment Well tolerated Well tolerated   Compression Layers 2 2   Compression Product Type Coflex Coflex   Dres

## (undated) NOTE — LETTER
Date: 12/30/2022  Patient name: Marjan Fields  YOB: 1945  Medical Record Number: [id number]  Primary Coverage: Payor: MEDICARE / Plan: MEDICARE PART A&B / Product Type: *No Product type* /   Secondary Coverage: [place] ID: [id number]  Patient Address: [address] [place] [address]  Telephone Information:   Home Phone [phone]   Work Phone [phone]       Encounter Date: 12/30/2022  Physician: Lana Petit MD  Diagnosis: (X76.839) Non-pressure chronic ulcer of right calf with fat layer exposed ([address]  (primary encounter diagnosis)  (D06.452) Non-pressure chronic ulcer of left calf with fat layer exposed ([address]  (I87.333,  L97.919,  L97.929) Chronic venous hypertension (idiopathic) with ulcer and inflammation of bilateral lower extremity ([address]  (I89.0) Lymphedema  (T14.8XXA,  L08.9) Infected wound      Wound 08/06/21 #1 Right lower leg Venous Ulcer Leg Right; Lower (Active)   Date First Assessed/Time First Assessed: 08/06/21 1503    Wound Number (Wound Clinic Only): #1 Right lower leg  Primary Wound Type: Venous Ulcer  Location: Leg  Wound Location Orientation: Right; Lower      Assessments 8/6/2021  3:17 PM 12/30/2022  9:21 AM   Wound Image        Drainage Amount Unable to assess --   Treatments Compression; Topical (Barrier/Moisturizer/Ointment) --   Dressing Other;Tape --   Wound Length (cm) 14 cm --   Wound Width (cm) 8.5 cm --   Wound Surface Area (cm^2) 119 cm^2 --   Wound Depth (cm) 0.1 cm --   Wound Volume (cm^3) 11.9 cm^3 --   Margins Undefined edges --   Non-staged Wound Description Full thickness --   Abigail-wound Assessment Dry;Edema; Hyperpigmented --   Wound Granulation Tissue Firm;Pink --   Wound Bed Granulation (%) 5 % --   Wound Bed Epithelium (%) 50 % --   Wound Bed Slough (%) 45 % --   Wound Odor None --   Shape Measured in a cluster --       Inactive Orders   Date Order Priority Status Authorizing Provider   12/02/22 2309 Debridement Routine Completed Ena Farrell MD   11/04/22 0920 Debridement Routine Completed Ena Farrell MD   10/07/22 3057 Debridement Routine Completed Ena Farrell MD   09/09/22 9592 Debridement Routine Completed Ena Farrell MD   08/12/22 8880 Debridement Routine Completed Ena Farrell MD   10/29/21 4852 Debridement Routine Completed Ena Farrell MD   09/24/21 0901 Debridement Routine Completed Ena Farrell MD   08/06/21 1609 OP WOUND DRESSING Routine Completed Ena Farrell MD     - Cleansing:    Cleanse with normal saline or wound cleanser     - Dressing:    Endoform collagen     - Dressing:    Hydrofera transfer     - Dressing:    Kerramax/Super absorbent     - Additional Wound Dressing Information:    zinc periwound       Wound 08/06/21 #2 left lower leg Venous Ulcer Leg Left; Lower (Active)   Date First Assessed/Time First Assessed: 08/06/21 1504    Wound Number (Wound Clinic Only): #2 left lower leg  Primary Wound Type: Venous Ulcer  Location: Leg  Wound Location Orientation: Left; Lower      Assessments 8/6/2021  3:15 PM 12/30/2022  9:20 AM   Wound Image         Drainage Amount Unable to assess --   Treatments Compression; Topical (Barrier/Moisturizer/Ointment) --   Dressing Other;Tape;Kerlix roll --   Wound Length (cm) 15.5 cm --   Wound Width (cm) 16.5 cm --   Wound Surface Area (cm^2) 255.75 cm^2 --   Wound Depth (cm) 0.1 cm --   Wound Volume (cm^3) 25.58 cm^3 --   Margins Undefined edges --   Non-staged Wound Description Full thickness --   Abigail-wound Assessment Edema; Hyperpigmented --   Wound Bed Epithelium (%) 70 % --   Wound Bed Slough (%) 30 % --   Wound Odor None --   Shape measured in a cluster --       Inactive Orders   Date Order Priority Status Authorizing Provider   12/02/22 0915 Debridement Routine Completed Ena Farrell MD   11/04/22 3258 Debridement Routine Completed Ena Farrell MD   10/07/22 5223 Debridement Routine Completed Sravanthi Spann MD   09/09/22 3121 Debridement Routine Completed Sravanthi Spann MD   08/12/22 7247 Debridement Routine Completed Sravanthi Spann MD   07/15/22 5685 Debridement Routine Completed Sravanthi Spann MD   10/29/21 0940 Debridement Routine Completed Sravanthi Spann MD   09/24/21 3202 Debridement Routine Completed Sravanthi Spann MD   08/06/21 1609 OP WOUND DRESSING Routine Completed Sravanthi Spann MD     - Cleansing:    Cleanse with normal saline or wound cleanser     - Dressing:    Endoform collagen     - Dressing:    Hydrofera transfer     - Dressing:    Kerramax/Super absorbent     - Additional Wound Dressing Information:    zinc periwound       Wound 12/30/22 #3 Venous Ulcer Leg Right;Lateral (Active)   Date First Assessed/Time First Assessed: 12/30/22 0844    Wound Number (Wound Clinic Only): #3  Primary Wound Type: Venous Ulcer  Location: Leg  Wound Location Orientation: Right;Lateral      Assessments 12/30/2022  8:46 AM   Wound Image     Drainage Amount Unable to assess   Wound Length (cm) 3 cm   Wound Width (cm) 1.9 cm   Wound Surface Area (cm^2) 5.7 cm^2   Wound Depth (cm) 0.1 cm   Wound Volume (cm^3) 0.57 cm^3   Margins Well-defined edges   Non-staged Wound Description Full thickness   Abigail-wound Assessment Edema; Moist   Wound Granulation Tissue Firm;Pale Grey   Wound Bed Granulation (%) 80 %   Wound Bed Epithelium (%) 10 %   Wound Bed Slough (%) 10 %   Wound Odor None       No Linked orders to display       Wound Number:  All wounds    Wound Cleaning and Dressings:  Showering directions: May shower with protection  Wound cleansing:  Clean with soap and water and Cleanse with Dakins solution  Wound cleaning frequency: during dressing changes  Wound product: Hydrofera classic blue, Xeroform gauze, Kerramax/super absorbent, ABD pad, Kerlix and Paper tape  Dressing change frequency:  Change dressing 3x per week  Enzymatic agent:  Not applicable    Compression Therapy:   Coflex calamine unna boot 20-30mmhg if tolarated - if not may use spandagrip E/F  **Encourage compression due to swelling and new wound**    Miscellaneous/Additional Orders:  Miscellaneous orders: Home health care nurse for wound care    Care Summary:  Care Summary: Discussed Plan of Care at beside with patient. Patient verbally acknowledges understanding of all instructions and all questions were answered. Follow Up:  Return in about 4 weeks (around 1/27/2023) for Wound followup.

## (undated) NOTE — LETTER
Date: 5/5/2023  Patient name: Ar Stahl  YOB: 1945  Medical Record Number: [id number]  Primary Coverage: Payor: MEDICARE / Plan: MEDICARE PART A&B / Product Type: *No Product type* /   Secondary Coverage: [place] ID: [id number]  Patient Address: [address] [place] [address]  Telephone Information:   Home Phone [phone]   Work Phone [phone]       Encounter Date: 5/5/2023  Provider: Lynda Stevens MD  Diagnosis: (Q85.262) Non-pressure chronic ulcer of right calf with fat layer exposed ([address]  (primary encounter diagnosis)  (M60.778) Non-pressure chronic ulcer of left calf with fat layer exposed ([address]  (I87.333) Chronic venous hypertension (idiopathic) with ulcer and inflammation of bilateral lower extremity ([address]  (I89.0) Lymphedema  (T14.8XXA,  L08.9) Infected wound      Wound 08/06/21 #1 Right lower leg Venous Ulcer Leg Right; Lower (Active)   Date First Assessed/Time First Assessed: 08/06/21 1503    Wound Number (Wound Clinic Only): #1 Right lower leg  Primary Wound Type: Venous Ulcer  Location: Leg  Wound Location Orientation: Right; Lower      Assessments 8/6/2021  3:17 PM 5/5/2023  8:36 AM   Wound Image          Drainage Amount Unable to assess Copious   Drainage Description -- Serous; Yellow   Treatments Compression; Topical (Barrier/Moisturizer/Ointment) Dakins   Dressing Other;Tape Kerlix roll;ABD Pad;Xeroform   Wound Length (cm) 14 cm 22.7 cm   Wound Width (cm) 8.5 cm 32.8 cm   Wound Surface Area (cm^2) 119 cm^2 744. 56 cm^2   Wound Depth (cm) 0.1 cm 0.2 cm   Wound Volume (cm^3) 11.9 cm^3 148.912 cm^3   Wound Healing % -- -1151   Margins Undefined edges Well-defined edges   Non-staged Wound Description Full thickness Full thickness   Abigail-wound Assessment Dry;Edema; Hyperpigmented Edema; Moist;Hemosiderin staining; Maceration   Wound Granulation Tissue Firm;Pink Pink;Firm   Wound Bed Granulation (%) 5 % 20 %   Wound Bed Epithelium (%) 50 % 20 %   Wound Bed Slough (%) 45 % 60 %   Wound Odor None Mild   Shape Measured in a cluster --       Inactive Orders   Date Order Priority Status Authorizing Provider   05/05/23 2768 Debridement Routine Completed Kailee Ellison MD   03/24/23 1115 Debridement Routine Completed Kailee Ellison MD   01/27/23 9389 Debridement Routine Completed Kailee Ellison MD   12/30/22 1047 Debridement Routine Completed Kailee Ellison MD   12/02/22 3128 Debridement Routine Completed Kailee Ellison MD   11/04/22 0920 Debridement Routine Completed Kailee Ellison MD   10/07/22 2121 Debridement Routine Completed Kailee Ellison MD   09/09/22 5136 Debridement Routine Completed Kailee Ellison MD   08/12/22 8212 Debridement Routine Completed Kailee Ellison MD   10/29/21 6786 Debridement Routine Completed Kailee Ellison MD   09/24/21 0901 Debridement Routine Completed Kailee Ellison MD   08/06/21 1609 OP WOUND DRESSING Routine Completed Kailee Ellison MD     - Cleansing:    Cleanse with normal saline or wound cleanser     - Dressing:    Endoform collagen     - Dressing:    Hydrofera transfer     - Dressing:    Kerramax/Super absorbent     - Additional Wound Dressing Information:    zinc periwound       Wound 08/06/21 #2 left lower leg Venous Ulcer Leg Left; Lower (Active)   Date First Assessed/Time First Assessed: 08/06/21 1504    Wound Number (Wound Clinic Only): #2 left lower leg  Primary Wound Type: Venous Ulcer  Location: Leg  Wound Location Orientation: Left; Lower      Assessments 8/6/2021  3:15 PM 5/5/2023  8:37 AM   Wound Image           Drainage Amount Unable to assess Copious   Drainage Description -- Serous; Yellow   Treatments Compression; Topical (Barrier/Moisturizer/Ointment) Dakins   Dressing Other;Tape;Kerlix roll ABD Pad;Kerlix roll;Xeroform   Wound Length (cm) 15.5 cm 23.7 cm   Wound Width (cm) 16.5 cm 35 cm   Wound Surface Area (cm^2) 255.75 cm^2 829.5 cm^2 Wound Depth (cm) 0.1 cm 0.2 cm   Wound Volume (cm^3) 25.58 cm^3 165.9 cm^3   Wound Healing % -- -549   Margins Undefined edges Well-defined edges   Non-staged Wound Description Full thickness Full thickness   Abigail-wound Assessment Edema; Hyperpigmented Edema; Hemosiderin staining;Moist;Maceration;Pink   Wound Granulation Tissue -- Pink;Firm   Wound Bed Granulation (%) -- 20 %   Wound Bed Epithelium (%) 70 % 20 %   Wound Bed Slough (%) 30 % 60 %   Wound Odor None Mild   Shape measured in a cluster --       Inactive Orders   Date Order Priority Status Authorizing Provider   05/05/23 9037 Debridement Routine Completed Hedy Her MD   03/24/23 1117 Debridement Routine Completed Hedy Her MD   01/27/23 6598 Debridement Routine Completed Hedy Her MD   12/30/22 1048 Debridement Routine Completed Hedy Her MD   12/02/22 0915 Debridement Routine Completed Hedy Her MD   11/04/22 0994 Debridement Routine Completed Hedy Her MD   10/07/22 6164 Debridement Routine Completed Hedy Her MD   09/09/22 6973 Debridement Routine Completed Hedy Her MD   08/12/22 0920 Debridement Routine Completed Hedy Her MD   07/15/22 0944 Debridement Routine Completed Hedy Her MD   10/29/21 0940 Debridement Routine Completed Hedy Her MD   09/24/21 2604 Debridement Routine Completed Hedy Her MD   08/06/21 1609 OP WOUND DRESSING Routine Completed Hedy Her MD     - Cleansing:    Cleanse with normal saline or wound cleanser     - Dressing:    Endoform collagen     - Dressing:    Hydrofera transfer     - Dressing:    Kerramax/Super absorbent     - Additional Wound Dressing Information:    zinc periwound         Wound Number:  All wounds    Wound Cleaning and Dressings:  Showering directions: May shower with protection  Wound cleansing:  Cleanse with normal saline or wound cleanser  Wound cleaning frequency: with dressing changes  Wound product: Xeroform gauze, ABD pad, Kerlix, and Paper tape  Dressing change frequency:  Change dressing 3x per week  Enzymatic agent:  Not applicable        Compression Therapy: Ace wrap    Miscellaneous/Additional Orders:  Miscellaneous orders: Home health care nurse for wound care    Care Summary:  Care Summary: Discussed Plan of Care at beside with patient. Patient verbally acknowledges understanding of all instructions and all questions were answered. Follow Up:  Return in about 6 weeks (around 6/16/2023) for Wound followup. Additional Notes:  HH to see patient three times per week for dressing changes. Continue xeroform, ABD pads, kerlix, and ACE wraps. Soak with Dakins before redressing. Wounds were cultured this visit.      Additional home health DME: No

## (undated) NOTE — LETTER
Date: 9/24/2021  Patient name: Luma Knutson  YOB: 1945  Medical Record Number: [id number]  Coverage: Payor: MEDICARE / Plan: MEDICARE PART A&B / Product Type: *No Product type* /   Insurance ID: [id number]  Patient Address: [address] [place] [address] Cody Sarah MD     - Cleansing:    Cleanse with normal saline or wound cleanser     - Dressing:    Endoform collagen     - Dressing:    Hydrofera transfer     - Dressing:    Kerramax/Super absorbent     - Additional Wound Dressing Information:    zinc vicente Compression Layers 2 2   Compression Product Type Coflex Coflex   Dressing Applied Yes Yes   Compression Wrap Location Toes to Knee Toes to Knee   Compression Wrap Status Clean; Dry; Intact Clean; Dry; Intact       No Linked orders to display       Compr

## (undated) NOTE — LETTER
Date: 6/11/2021  Patient name: Shane Call  YOB: 1945  Medical Record Number: [id number]  Coverage: Payor: MEDICARE / Plan: MEDICARE PART A&B / Product Type: *No Product type* /   Insurance ID: [id number]  Patient Address: [address] [place] [address] Dressing:    ABD pad     - Additional Wound Dressing Information:    ZINC BARRIER CREAM PERIWOUND     - Frequency:    Change dressing three times per week   05/28/21 0932 WOUND COMPRESSION THERAPY Routine Completed David Lorenzo MD     - Type:    Co 100 % —   Wound Bed Epithelium (%) — 100 %   Margins Well-defined edges Well-defined edges   Non-staged Wound Description Full thickness Full thickness       Inactive Orders   Date Order Priority Status Authorizing Provider   06/11/21 Vickey M[address] Dr change frequency:  Change dressing 3x per week  Enzymatic agent:  Not applicable    Compression Therapy:   Coflex 2 layers    Miscellaneous/Additional Orders:  Miscellaneous orders: Home health care nurse for wound care    Care Summary:  Care Summary: Disc

## (undated) NOTE — LETTER
Date: 1/28/2022  Patient name: Maikol Solano  YOB: 1945  Medical Record Number: [id number]  Coverage: Payor: MEDICARE / Plan: MEDICARE PART A&B / Product Type: *No Product type* /   Insurance ID: [id number]  Patient Address: [address] [place] [address] Authorizing Provider   10/29/21 7293 Debridement Routine Completed Homer Zaman MD   09/24/21 7628 Debridement Routine Completed Homer Zaman MD   08/06/21 1602 OP WOUND DRESSING Routine Completed Homer Zaman MD     - Cleansing: Cleansing:    Cleanse with normal saline or wound cleanser     - Dressing:    Endoform collagen     - Dressing:    Hydrofera transfer     - Dressing:    Kerramax/Super absorbent     - Additional Wound Dressing Information:    zinc periwound       Wound Num

## (undated) NOTE — LETTER
Date: 5/28/2021  Patient name: Sarahi Mcmahon  YOB: 1945  Medical Record Number: [id number]  Coverage: Payor: MEDICARE / Plan: MEDICARE PART A&B / Product Type: *No Product type* /   Insurance ID: [id number]  Patient Address: [address] [place] [address] Hydrofera transfer     - Additional Wound Dressing Information:    zinc periwound     - Cleansing:    Cleanse with normal saline or wound cleanser     - Frequency:    Change dressing three times per week   05/14/21 1048 WOUND COMPRESSION THERAPY Routine Co Dressing:    Endoform collagen     - Dressing:    Hydrofera transfer     - Additional Wound Dressing Information:    zinc periwound     - Cleansing:    Cleanse with normal saline or wound cleanser     - Frequency:    Change dressing three times per week NPWT: No negative pressure device therapy needed      Care Summary:  Care Summary: Discussed Plan of Care at beside with patient. Patient verbally acknowledges understanding of all instructions and all questions were answered.         Follow Up:  Return in